# Patient Record
Sex: FEMALE | Race: WHITE | NOT HISPANIC OR LATINO | Employment: UNEMPLOYED | URBAN - NONMETROPOLITAN AREA
[De-identification: names, ages, dates, MRNs, and addresses within clinical notes are randomized per-mention and may not be internally consistent; named-entity substitution may affect disease eponyms.]

---

## 2019-09-20 ENCOUNTER — HOSPITAL ENCOUNTER (OUTPATIENT)
Dept: BONE DENSITY | Facility: HOSPITAL | Age: 74
Discharge: HOME | End: 2019-09-20

## 2019-09-20 ENCOUNTER — HOSPITAL ENCOUNTER (OUTPATIENT)
Dept: MAMMOGRAPHY | Facility: HOSPITAL | Age: 74
Discharge: HOME | End: 2019-09-20
Admitting: NURSE PRACTITIONER

## 2019-09-20 DIAGNOSIS — Z12.39 SCREENING BREAST EXAMINATION: ICD-10-CM

## 2019-09-20 DIAGNOSIS — M81.0 OSTEOPOROSIS, UNSPECIFIED OSTEOPOROSIS TYPE, UNSPECIFIED PATHOLOGICAL FRACTURE PRESENCE: ICD-10-CM

## 2019-09-20 PROCEDURE — 77080 DXA BONE DENSITY AXIAL: CPT

## 2019-09-20 PROCEDURE — 77067 SCR MAMMO BI INCL CAD: CPT | Performed by: RADIOLOGY

## 2019-09-20 PROCEDURE — 77067 SCR MAMMO BI INCL CAD: CPT

## 2019-09-20 PROCEDURE — 77080 DXA BONE DENSITY AXIAL: CPT | Performed by: RADIOLOGY

## 2019-09-20 PROCEDURE — 77063 BREAST TOMOSYNTHESIS BI: CPT

## 2019-09-20 PROCEDURE — 77063 BREAST TOMOSYNTHESIS BI: CPT | Performed by: RADIOLOGY

## 2020-10-30 ENCOUNTER — APPOINTMENT (OUTPATIENT)
Dept: BONE DENSITY | Facility: HOSPITAL | Age: 75
End: 2020-10-30

## 2021-10-28 ENCOUNTER — OFFICE VISIT (OUTPATIENT)
Dept: CARDIOLOGY | Facility: CLINIC | Age: 76
End: 2021-10-28

## 2021-10-28 ENCOUNTER — PATIENT ROUNDING (BHMG ONLY) (OUTPATIENT)
Dept: CARDIOLOGY | Facility: CLINIC | Age: 76
End: 2021-10-28

## 2021-10-28 VITALS
SYSTOLIC BLOOD PRESSURE: 139 MMHG | HEIGHT: 67 IN | BODY MASS INDEX: 29.03 KG/M2 | OXYGEN SATURATION: 96 % | HEART RATE: 80 BPM | WEIGHT: 185 LBS | DIASTOLIC BLOOD PRESSURE: 81 MMHG

## 2021-10-28 DIAGNOSIS — I73.9 PAD (PERIPHERAL ARTERY DISEASE) (HCC): Primary | ICD-10-CM

## 2021-10-28 DIAGNOSIS — I10 ESSENTIAL HYPERTENSION: ICD-10-CM

## 2021-10-28 DIAGNOSIS — R60.0 BILATERAL LEG EDEMA: ICD-10-CM

## 2021-10-28 PROCEDURE — 93000 ELECTROCARDIOGRAM COMPLETE: CPT | Performed by: INTERNAL MEDICINE

## 2021-10-28 PROCEDURE — 99203 OFFICE O/P NEW LOW 30 MIN: CPT | Performed by: INTERNAL MEDICINE

## 2021-10-28 RX ORDER — GABAPENTIN 400 MG/1
CAPSULE ORAL
COMMUNITY
Start: 2021-10-27

## 2021-10-28 RX ORDER — CYCLOBENZAPRINE HCL 10 MG
TABLET ORAL
COMMUNITY
Start: 2021-10-27

## 2021-10-28 RX ORDER — LACTULOSE 10 G/15ML
SOLUTION ORAL
COMMUNITY
Start: 2021-09-27

## 2021-10-28 RX ORDER — METOPROLOL SUCCINATE 25 MG/1
TABLET, EXTENDED RELEASE ORAL
COMMUNITY
Start: 2021-10-04

## 2021-10-28 RX ORDER — FLUTICASONE PROPIONATE 50 MCG
SPRAY, SUSPENSION (ML) NASAL
COMMUNITY
Start: 2021-10-04

## 2021-10-28 RX ORDER — PREDNISONE 1 MG/1
TABLET ORAL
COMMUNITY
Start: 2021-10-01

## 2021-10-28 RX ORDER — FAMOTIDINE 40 MG/1
TABLET, FILM COATED ORAL
COMMUNITY
Start: 2021-10-05

## 2021-10-28 RX ORDER — HYDROCODONE BITARTRATE AND ACETAMINOPHEN 10; 325 MG/1; MG/1
TABLET ORAL
COMMUNITY
Start: 2021-09-27

## 2021-10-28 RX ORDER — FOLIC ACID 1 MG/1
TABLET ORAL
COMMUNITY
Start: 2021-09-29

## 2021-10-28 RX ORDER — LISINOPRIL 10 MG/1
TABLET ORAL
COMMUNITY
Start: 2021-10-04

## 2021-10-28 RX ORDER — METHOTREXATE 2.5 MG/1
TABLET ORAL
COMMUNITY
Start: 2021-09-08

## 2021-10-28 RX ORDER — ASPIRIN 81 MG/1
81 TABLET ORAL DAILY
COMMUNITY

## 2021-10-28 RX ORDER — IBANDRONATE SODIUM 150 MG/1
TABLET, FILM COATED ORAL
COMMUNITY
Start: 2021-09-27

## 2021-10-28 NOTE — PROGRESS NOTES
"October 28, 2021    Hello, may I speak with Tiara Stubbs?    My name is Charanjit Jordan.       I am  with [facility] CARDIOLOGY  [address] [facility] [address]  [place] [address] [phone].    Before we get started may I verify your date of birth? 1945    I am calling to officially welcome you to our practice and ask about your recent visit. Is this a good time to talk? yes    Tell me about your visit with us. What things went well? \"I was very satisfied with Dr. Bush. I am tickled to death with my appointment and he made me feel a lot better about the symptoms I was having.\"       We're always looking for ways to make our patients' experiences even better. Do you have recommendations on ways we may improve?  no    Overall were you satisfied with your first visit to our practice? yes       I appreciate you taking the time to speak with me today. Is there anything else I can do for you? no      Thank you, and have a great day.      "

## 2021-11-17 ENCOUNTER — HOSPITAL ENCOUNTER (OUTPATIENT)
Dept: CARDIOLOGY | Facility: HOSPITAL | Age: 76
Discharge: HOME | End: 2021-11-17
Admitting: INTERNAL MEDICINE

## 2021-11-17 DIAGNOSIS — I10 ESSENTIAL HYPERTENSION: ICD-10-CM

## 2021-11-17 DIAGNOSIS — R60.0 BILATERAL LEG EDEMA: ICD-10-CM

## 2021-11-17 DIAGNOSIS — I73.9 PAD (PERIPHERAL ARTERY DISEASE) (HCC): ICD-10-CM

## 2021-11-17 PROCEDURE — 93306 TTE W/DOPPLER COMPLETE: CPT | Performed by: INTERNAL MEDICINE

## 2021-11-17 PROCEDURE — 93306 TTE W/DOPPLER COMPLETE: CPT

## 2021-11-24 LAB
BH CV ECHO MEAS - % IVS THICK: 17.8 %
BH CV ECHO MEAS - % LVPW THICK: 26.2 %
BH CV ECHO MEAS - ACS: 2.1 CM
BH CV ECHO MEAS - AI DEC SLOPE: 299 CM/SEC^2
BH CV ECHO MEAS - AI MAX PG: 88 MMHG
BH CV ECHO MEAS - AI MAX VEL: 469 CM/SEC
BH CV ECHO MEAS - AI P1/2T: 459.4 MSEC
BH CV ECHO MEAS - AO MAX PG: 12.1 MMHG
BH CV ECHO MEAS - AO MEAN PG: 6 MMHG
BH CV ECHO MEAS - AO ROOT AREA (BSA CORRECTED): 1.5
BH CV ECHO MEAS - AO ROOT AREA: 7.1 CM^2
BH CV ECHO MEAS - AO ROOT DIAM: 3 CM
BH CV ECHO MEAS - AO V2 MAX: 174 CM/SEC
BH CV ECHO MEAS - AO V2 MEAN: 117 CM/SEC
BH CV ECHO MEAS - AO V2 VTI: 39.9 CM
BH CV ECHO MEAS - BSA(HAYCOCK): 2 M^2
BH CV ECHO MEAS - BSA: 2 M^2
BH CV ECHO MEAS - BZI_BMI: 29 KILOGRAMS/M^2
BH CV ECHO MEAS - BZI_METRIC_HEIGHT: 170.2 CM
BH CV ECHO MEAS - BZI_METRIC_WEIGHT: 83.9 KG
BH CV ECHO MEAS - EDV(CUBED): 138.6 ML
BH CV ECHO MEAS - EDV(MOD-SP4): 36.4 ML
BH CV ECHO MEAS - EDV(TEICH): 128.1 ML
BH CV ECHO MEAS - EF(CUBED): 70.9 %
BH CV ECHO MEAS - EF(MOD-SP4): 50.5 %
BH CV ECHO MEAS - EF(TEICH): 62.2 %
BH CV ECHO MEAS - ESV(CUBED): 40.4 ML
BH CV ECHO MEAS - ESV(MOD-SP4): 18 ML
BH CV ECHO MEAS - ESV(TEICH): 48.5 ML
BH CV ECHO MEAS - FS: 33.7 %
BH CV ECHO MEAS - IVS/LVPW: 1.2
BH CV ECHO MEAS - IVSD: 1.1 CM
BH CV ECHO MEAS - IVSS: 1.3 CM
BH CV ECHO MEAS - LA DIMENSION: 2.6 CM
BH CV ECHO MEAS - LA/AO: 0.87
BH CV ECHO MEAS - LV DIASTOLIC VOL/BSA (35-75): 18.6 ML/M^2
BH CV ECHO MEAS - LV MASS(C)D: 199 GRAMS
BH CV ECHO MEAS - LV MASS(C)DI: 101.7 GRAMS/M^2
BH CV ECHO MEAS - LV MASS(C)S: 139.6 GRAMS
BH CV ECHO MEAS - LV MASS(C)SI: 71.4 GRAMS/M^2
BH CV ECHO MEAS - LV SYSTOLIC VOL/BSA (12-30): 9.2 ML/M^2
BH CV ECHO MEAS - LVIDD: 5.2 CM
BH CV ECHO MEAS - LVIDS: 3.4 CM
BH CV ECHO MEAS - LVLD AP4: 5.8 CM
BH CV ECHO MEAS - LVLS AP4: 6.1 CM
BH CV ECHO MEAS - LVOT AREA (M): 2.5 CM^2
BH CV ECHO MEAS - LVOT AREA: 2.5 CM^2
BH CV ECHO MEAS - LVOT DIAM: 1.8 CM
BH CV ECHO MEAS - LVPWD: 0.91 CM
BH CV ECHO MEAS - LVPWS: 1.2 CM
BH CV ECHO MEAS - MV A MAX VEL: 113 CM/SEC
BH CV ECHO MEAS - MV E MAX VEL: 74.9 CM/SEC
BH CV ECHO MEAS - MV E/A: 0.66
BH CV ECHO MEAS - PA ACC TIME: 0.06 SEC
BH CV ECHO MEAS - PA PR(ACCEL): 52 MMHG
BH CV ECHO MEAS - RAP SYSTOLE: 10 MMHG
BH CV ECHO MEAS - RVSP: 42.6 MMHG
BH CV ECHO MEAS - SI(AO): 144.2 ML/M^2
BH CV ECHO MEAS - SI(CUBED): 50.2 ML/M^2
BH CV ECHO MEAS - SI(MOD-SP4): 9.4 ML/M^2
BH CV ECHO MEAS - SI(TEICH): 40.7 ML/M^2
BH CV ECHO MEAS - SV(AO): 282 ML
BH CV ECHO MEAS - SV(CUBED): 98.2 ML
BH CV ECHO MEAS - SV(MOD-SP4): 18.4 ML
BH CV ECHO MEAS - SV(TEICH): 79.6 ML
BH CV ECHO MEAS - TR MAX VEL: 285 CM/SEC
LV EF 2D ECHO EST: 65 %
MAXIMAL PREDICTED HEART RATE: 144 BPM
STRESS TARGET HR: 122 BPM

## 2022-06-07 ENCOUNTER — TELEPHONE (OUTPATIENT)
Dept: CARDIOLOGY | Facility: CLINIC | Age: 77
End: 2022-06-07

## 2022-10-07 ENCOUNTER — HOSPITAL ENCOUNTER (OUTPATIENT)
Dept: BONE DENSITY | Facility: HOSPITAL | Age: 77
Discharge: HOME | End: 2022-10-07
Admitting: PHYSICAL MEDICINE & REHABILITATION

## 2022-10-07 DIAGNOSIS — M54.00 PANNICULITIS AFFECTING BACK: ICD-10-CM

## 2022-10-07 PROCEDURE — 77080 DXA BONE DENSITY AXIAL: CPT

## 2022-10-07 PROCEDURE — 77080 DXA BONE DENSITY AXIAL: CPT | Performed by: RADIOLOGY

## 2024-05-23 ENCOUNTER — OFFICE VISIT (OUTPATIENT)
Dept: PULMONOLOGY | Facility: CLINIC | Age: 79
End: 2024-05-23
Payer: MEDICARE

## 2024-05-23 VITALS
BODY MASS INDEX: 30.73 KG/M2 | DIASTOLIC BLOOD PRESSURE: 82 MMHG | HEIGHT: 67 IN | WEIGHT: 195.8 LBS | SYSTOLIC BLOOD PRESSURE: 148 MMHG | HEART RATE: 94 BPM | TEMPERATURE: 96.8 F | OXYGEN SATURATION: 98 %

## 2024-05-23 DIAGNOSIS — R05.3 CHRONIC COUGH: Primary | ICD-10-CM

## 2024-05-23 DIAGNOSIS — Z23 IMMUNIZATION DUE: ICD-10-CM

## 2024-05-23 DIAGNOSIS — R06.02 SOB (SHORTNESS OF BREATH): ICD-10-CM

## 2024-05-23 DIAGNOSIS — E66.3 OVERWEIGHT (BMI 25.0-29.9): ICD-10-CM

## 2024-05-23 RX ORDER — AZELASTINE 1 MG/ML
1 SPRAY, METERED NASAL 2 TIMES DAILY
COMMUNITY
Start: 2024-04-16

## 2024-05-23 RX ORDER — OLMESARTAN MEDOXOMIL 40 MG/1
TABLET ORAL
COMMUNITY
Start: 2024-04-30

## 2024-05-23 RX ORDER — BENZONATATE 100 MG/1
100 CAPSULE ORAL
COMMUNITY
Start: 2024-05-06

## 2024-05-23 RX ORDER — VALACYCLOVIR HYDROCHLORIDE 500 MG/1
500 TABLET, FILM COATED ORAL
COMMUNITY
Start: 2024-01-18

## 2024-05-23 RX ORDER — ROSUVASTATIN CALCIUM 20 MG/1
20 TABLET, COATED ORAL
COMMUNITY
Start: 2024-03-22

## 2024-05-23 RX ORDER — ALBUTEROL SULFATE 1.25 MG/3ML
1.25 SOLUTION RESPIRATORY (INHALATION)
COMMUNITY
Start: 2024-04-30

## 2024-05-23 NOTE — PROGRESS NOTES
History of Present Illness   Were you born premature?  no    Any Childhood infections? yes      Breathing problems when you were a child? no    Any childhood allergies?    no             At what age did you begin smoking? NA    Smoking marijuana? no    Any IV drugs? no    How many packs per day?  0    Lung Function Test? yes  Chest X-Ray? yes    CT Chest? yes Allergy Test? no    Family hx of Lung disease or Lung Cancer?no    If FHx is posivitive for lung cancer, what is the relationship of the family member?  NA    Any hospitalization in the last year? yes    How far can you walk without getting short of breath? 1/4 miles    Any coughing? resolved     Any wheezing? no    Acid Reflux? yes    Do you snore? no    Daytime Fatigue? yes    Any pets? no   Any pet allergies? 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 no    What inhalers are you currently using?    Veronique Stubbs presents for the following PERSISTENT COUGH    Above mentioned questionnaire was reviewed and discussed with the patient in detail.    Review of Systems  Positive for cough  Otherwise negative  Active Problems:  Problem List Items Addressed This Visit          Endocrine and Metabolic    Overweight (BMI 25.0-29.9)       Pulmonary and Pneumonias    Chronic cough - Primary    SOB (shortness of breath)    Relevant Orders    Complete PFT - Pre & Post Bronchodilator       Other    Immunization due    Relevant Orders    Pneumococcal Polysaccharide Vaccine 23-Valent (PPSV23) Greater Than or Equal To 1yo Subcutaneous / IM (Completed)       Past Medical History:  Past Medical History:   Diagnosis Date    Acid reflux     Fibromyalgia     High cholesterol     Hx: recurrent pneumonia     Hypertension     Lupus     Osteoarthritis     Rheumatoid arthritis        Family History:  Family History   Problem Relation Age of Onset    Heart disease Mother     Liver cancer Mother     Heart disease Father     Breast  cancer Neg Hx        Social History:  Social History     Tobacco Use    Smoking status: Never     Passive exposure: Never    Smokeless tobacco: Never   Substance Use Topics    Alcohol use: Never       Current Medications:  Current Outpatient Medications   Medication Sig Dispense Refill    albuterol (ACCUNEB) 1.25 MG/3ML nebulizer solution 3 mL.      aspirin 81 MG EC tablet Take 1 tablet by mouth Daily.      azelastine (ASTELIN) 0.1 % nasal spray 1 spray 2 (Two) Times a Day.      benzonatate (TESSALON) 100 MG capsule 1 capsule.      Budeson-Glycopyrrol-Formoterol (BREZTRI) 160-9-4.8 MCG/ACT aerosol inhaler 2 (Two) Times a Day.      Cholecalciferol 50 MCG (2000 UT) tablet Daily.      cyclobenzaprine (FLEXERIL) 10 MG tablet       Diclofenac Sodium (VOLTAREN) 1 % gel gel APPLY 4 GRAMS TOPICALLY FOUR TIMES DAILY AS NEEDED FOR PAIN. APPLY TO A SINGLE KNEE ANKLE FOOT (FOR FOOT INCLUDES SOLE TOES TOP OF FOOT      famotidine (PEPCID) 40 MG tablet       fluticasone (FLONASE) 50 MCG/ACT nasal spray       folic acid (FOLVITE) 1 MG tablet Take one tablet every day except mondays      gabapentin (NEURONTIN) 400 MG capsule       HYDROcodone-acetaminophen (NORCO)  MG per tablet TAKE 1 TABLET BY MOUTH FOUR TIMES DAILY AS NEEDED FOR PAIN (DO NOT FILL UNTIL ON OR AFTER 09/26/21)      ibandronate (BONIVA) 150 MG tablet TAKE 1 TABLET BY MOUTH ONCE every MONTH      lactulose (CHRONULAC) 10 GM/15ML solution       metoprolol succinate XL (TOPROL-XL) 25 MG 24 hr tablet       olmesartan (BENICAR) 40 MG tablet TAKE 1 TABLET BY MOUTH ONCE DAILY AS DIRECTED Dose Increase      predniSONE (DELTASONE) 1 MG tablet 4 tablets.      rosuvastatin (CRESTOR) 20 MG tablet 1 tablet.      valACYclovir (VALTREX) 500 MG tablet 1 tablet.       No current facility-administered medications for this visit.       Allergies:  Allergies   Allergen Reactions    Codeine     Stadol [Butorphanol]     Vistaril [Hydroxyzine Hcl]        Vitals:  /82   Pulse 94  "  Temp 96.8 °F (36 °C)   Ht 170.2 cm (67\")   Wt 88.8 kg (195 lb 12.8 oz)   SpO2 98%   BMI 30.67 kg/m²     Imaging:    Imaging Results (Most Recent)       None            Pulmonary Functions Testing Results:    No results found for: \"FEV1\", \"FVC\", \"PZV6XCC\", \"TLC\", \"DLCO\"    Results for orders placed or performed during the hospital encounter of 11/17/21   Adult Transthoracic Echo Complete W/ Cont if Necessary Per Protocol   Result Value Ref Range    BSA 2.0 m^2    IVSd 1.1 cm    IVSs 1.3 cm    LVIDd 5.2 cm    LVIDs 3.4 cm    LVPWd 0.91 cm    BH CV ECHO KEELEY - LVPWS 1.2 cm    IVS/LVPW 1.2     FS 33.7 %    EDV(Teich) 128.1 ml    ESV(Teich) 48.5 ml    EF(Teich) 62.2 %    EDV(cubed) 138.6 ml    ESV(cubed) 40.4 ml    EF(cubed) 70.9 %    % IVS thick 17.8 %    % LVPW thick 26.2 %    LV mass(C)d 199.0 grams    LV mass(C)dI 101.7 grams/m^2    LV mass(C)s 139.6 grams    LV mass(C)sI 71.4 grams/m^2    SV(Teich) 79.6 ml    SI(Teich) 40.7 ml/m^2    SV(cubed) 98.2 ml    SI(cubed) 50.2 ml/m^2    Ao root diam 3.0 cm    Ao root area 7.1 cm^2    ACS 2.1 cm    LA dimension (2D)  2.6 cm    LA/Ao 0.87     LVOT diam 1.8 cm    LVOT area 2.5 cm^2    LVOT area(traced) 2.5 cm^2    LVLd ap4 5.8 cm    EDV(MOD-sp4) 36.4 ml    LVLs ap4 6.1 cm    ESV(MOD-sp4) 18.0 ml    EF(MOD-sp4) 50.5 %    SV(MOD-sp4) 18.4 ml    SVi(MOD-SP4) 9.4 ml/m^2    Ao root area (BSA corrected) 1.5     LV Hopkins Vol (BSA corrected) 18.6 ml/m^2    LV Sys Vol (BSA corrected) 9.2 ml/m^2    MV E max alexx 74.9 cm/sec    MV A max alexx 113.0 cm/sec    MV E/A 0.66     Ao pk alexx 174.0 cm/sec    Ao max PG 12.1 mmHg    Ao V2 mean 117.0 cm/sec    Ao mean PG 6.0 mmHg    Ao V2 VTI 39.9 cm    AI max alexx 469.0 cm/sec    AI max PG 88.0 mmHg    AI dec slope 299.0 cm/sec^2    AI P1/2t 459.4 msec    SV(Ao) 282.0 ml    SI(Ao) 144.2 ml/m^2    PA acc time 0.06 sec    TR max alexx 285.0 cm/sec    RVSP(TR) 42.6 mmHg    RAP systole 10.0 mmHg    PA pr(Accel) 52.0 mmHg    BH CV ECHO KEELEY - BZI_BMI " 29.0 kilograms/m^2     CV ECHO KEELEY - BSA([facility]) 2.0 m^2     CV ECHO KEELEY - BZI_METRIC_WEIGHT 83.9 kg     CV ECHO KEELEY - BZI_METRIC_HEIGHT 170.2 cm    Target HR (85%) 122 bpm    Max. Pred. HR (100%) 144 bpm    Echo EF Estimated 65 %       Objective   Physical Exam  General- normal in appearance, not in any acute distress    HEENT- pupils equally reactive to light, normal in size, no scleral icterus    Neck-supple    Respiratory-respirations normal-on auscultation no wheezing no crackles,     Cardiovascular-  Normal S1 and S2. No S3, S4 or murmurs. No JVD, no carotid bruit and no edema, pulses normal bilaterally     GI-nontender nondistended bowel sounds positive    CNS-nonfocal    Musculoskeletal -no edema  Extremities- no obvious deformity noticed     Psychiatric-mood good, good eye contact, alert awake oriented  Skin- no visible rash        Assessment & Plan   Chronic cough- resolved.    Ct chest shows resolution of infiltrates. Findings were shared and d/w patient.  Images were shown.  Will get PFTs and treat accordingly.  Denies any choking or cough with food or water.      Overweight- diet and exercise counseling done.    SOB- likely multifactorial- related to patients body habitus and deconditioning.  Will get Pfts and treat accordingly.  Ct chest reviewed and doesnot show any parenchymal infiltrates.  Results for orders placed during the hospital encounter of 11/17/21    Adult Transthoracic Echo Complete W/ Cont if Necessary Per Protocol    Interpretation Summary  · Estimated left ventricular EF = 65% Left ventricular ejection fraction appears to be 61 - 65%. Left ventricular systolic function is normal.  · Left ventricular diastolic function was normal.  · Estimated right ventricular systolic pressure from tricuspid regurgitation is mildly elevated (35-45 mmHg).  · Moderate pulmonary hypertension is present.  · No pericardial effusion  · No prev echo     Will give pneumococcal vaccine    Body mass  index is 30.67 kg/m².     ICD-10-CM ICD-9-CM   1. Chronic cough  R05.3 786.2   2. SOB (shortness of breath)  R06.02 786.05   3. Immunization due  Z23 V05.9   4. Overweight (BMI 25.0-29.9)  E66.3 278.02       Return in about 6 months (around 11/23/2024).

## 2024-05-24 PROBLEM — R06.02 SOB (SHORTNESS OF BREATH): Status: ACTIVE | Noted: 2024-05-24

## 2024-05-24 PROBLEM — R05.3 CHRONIC COUGH: Status: ACTIVE | Noted: 2024-05-24

## 2024-05-24 PROBLEM — Z23 IMMUNIZATION DUE: Status: ACTIVE | Noted: 2024-05-24

## 2024-05-24 PROBLEM — E66.3 OVERWEIGHT (BMI 25.0-29.9): Status: ACTIVE | Noted: 2024-05-24

## 2024-12-05 ENCOUNTER — OFFICE VISIT (OUTPATIENT)
Dept: PULMONOLOGY | Facility: CLINIC | Age: 79
End: 2024-12-05
Payer: MEDICARE

## 2024-12-05 VITALS
TEMPERATURE: 97.5 F | HEART RATE: 96 BPM | WEIGHT: 196.2 LBS | SYSTOLIC BLOOD PRESSURE: 136 MMHG | HEIGHT: 67 IN | DIASTOLIC BLOOD PRESSURE: 80 MMHG | BODY MASS INDEX: 30.79 KG/M2 | OXYGEN SATURATION: 95 %

## 2024-12-05 DIAGNOSIS — R05.3 CHRONIC COUGH: Primary | ICD-10-CM

## 2024-12-05 RX ORDER — METHOTREXATE 2.5 MG/1
20 TABLET ORAL
COMMUNITY
Start: 2024-06-26

## 2024-12-05 NOTE — PROGRESS NOTES
"Chief Complaint  Cough    Subjective          Tiara YASSINE Stubbs presents to [facility] PULMONARY & CRITICAL CARE MEDICINE for   History of Present Illness    Ms. Stubbs is a 79 year old female with a medical history significant for fibromyalgia, hypertension, lupus, and RA.    She presents today for follow up on Cough.  She reports that her cough has resolved since he was last seen.  She denies any shortness of breath.  She states that she did her PFT at [facility].        Objective   Vital Signs:   /80   Pulse 96   Temp 97.5 °F (36.4 °C)   Ht 170.2 cm (67\")   Wt 89 kg (196 lb 3.2 oz)   SpO2 95%   BMI 30.73 kg/m²         Physical Exam    GENERAL APPEARANCE: Well developed, well nourished, alert and cooperative, and appears to be in no acute distress.    HEAD: normocephalic. Atraumatic.    EYES: PERRL, EOMI. Vision is grossly intact.    THROAT: Oral cavity and pharynx normal. No inflammation, swelling, exudate, or lesions.     NECK: Neck supple.  No thyromegaly.    CARDIAC: Normal S1 and S2. No S3, S4 or murmurs. Rhythm is regular.     RESPIRATORY:Bilateral air entry positive.  No wheezing, crackles or rhonchi noted.    GI: Positive bowel sounds. Soft, nondistended, nontender.     MUSCULOSKELETAL: No significant deformity or joint abnormality. No edema. Peripheral pulses intact. No varicosities.    NEUROLOGICAL: Strength and sensation symmetric and intact throughout.     PSYCHIATRIC: The mental examination revealed the patient was oriented to person, place, and time.       Estimated body mass index is 30.73 kg/m² as calculated from the following:    Height as of this encounter: 170.2 cm (67\").    Weight as of this encounter: 89 kg (196 lb 3.2 oz).        Result Review :   The following data was reviewed by: DIEGO Palacio on 12/05/2024:  Common labs          6/25/2024    12:28   Common Labs   Albumin 3.8       Total Bilirubin 0.3       Alkaline Phosphatase 58     " "  AST (SGOT) 23       ALT (SGPT) 13       WBC 11.03       Hemoglobin 12.9       Hematocrit 41       Platelets 222          Details          This result is from an external source.                  PFT: Will obtain records.    Low dose lung cancer screening:NA    Previous chest imaging:    CT Chest 4/8/24      Alpha-1 antitrypsin screening:NA    STOP-Bang Score:   NA  [place] Sleepiness Scale:   NA      ABG:    pH No results found for: \"PHART\"   pO2 No results found for: \"PO2ART\"   pCO2 No results found for: \"QMW4LGO\"   HCO3 No results found for: \"CRE4MBY\"                      Assessment and Plan    Problem List Items Addressed This Visit          Pulmonary and Pneumonias    Chronic cough - Primary       Tiara Stubbs  reports that she has never smoked. She has never been exposed to tobacco smoke. She has never used smokeless tobacco.     She reports that her cough has resolved.  She denies any shortness of breath.    CT Chest shows resolution of infiltrates.      Will obtain results of PFT.         Follow Up   Return if symptoms worsen or fail to improve.  Patient was given instructions and counseling regarding her condition or for health maintenance advice. Please see specific information pulled into the AVS if appropriate.        "

## 2025-06-07 ENCOUNTER — APPOINTMENT (OUTPATIENT)
Dept: GENERAL RADIOLOGY | Facility: HOSPITAL | Age: 80
End: 2025-06-07
Payer: MEDICARE

## 2025-06-07 ENCOUNTER — HOSPITAL ENCOUNTER (INPATIENT)
Facility: HOSPITAL | Age: 80
LOS: 10 days | Discharge: HOME HEALTH | End: 2025-06-17
Attending: STUDENT IN AN ORGANIZED HEALTH CARE EDUCATION/TRAINING PROGRAM | Admitting: INTERNAL MEDICINE
Payer: MEDICARE

## 2025-06-07 DIAGNOSIS — S92.901A CLOSED FRACTURE OF RIGHT FOOT, INITIAL ENCOUNTER: ICD-10-CM

## 2025-06-07 DIAGNOSIS — M25.571 ACUTE RIGHT ANKLE PAIN: ICD-10-CM

## 2025-06-07 DIAGNOSIS — S42.91XA SHOULDER FRACTURE, RIGHT, CLOSED, INITIAL ENCOUNTER: Primary | ICD-10-CM

## 2025-06-07 DIAGNOSIS — Z98.890 POST-OPERATIVE STATE: ICD-10-CM

## 2025-06-07 LAB
ALBUMIN SERPL-MCNC: 3.8 G/DL (ref 3.5–5.2)
ALBUMIN/GLOB SERPL: 1.5 G/DL
ALP SERPL-CCNC: 60 U/L (ref 39–117)
ALT SERPL W P-5'-P-CCNC: 49 U/L (ref 1–33)
ANION GAP SERPL CALCULATED.3IONS-SCNC: 12.1 MMOL/L (ref 5–15)
AST SERPL-CCNC: 87 U/L (ref 1–32)
B PARAPERT DNA SPEC QL NAA+PROBE: NOT DETECTED
B PERT DNA SPEC QL NAA+PROBE: NOT DETECTED
BASOPHILS # BLD AUTO: 0.04 10*3/MM3 (ref 0–0.2)
BASOPHILS NFR BLD AUTO: 0.4 % (ref 0–1.5)
BILIRUB SERPL-MCNC: 0.6 MG/DL (ref 0–1.2)
BUN SERPL-MCNC: 19.5 MG/DL (ref 8–23)
BUN/CREAT SERPL: 23.5 (ref 7–25)
C PNEUM DNA NPH QL NAA+NON-PROBE: NOT DETECTED
CALCIUM SPEC-SCNC: 8.7 MG/DL (ref 8.6–10.5)
CHLORIDE SERPL-SCNC: 108 MMOL/L (ref 98–107)
CO2 SERPL-SCNC: 22.9 MMOL/L (ref 22–29)
CREAT SERPL-MCNC: 0.83 MG/DL (ref 0.57–1)
DEPRECATED RDW RBC AUTO: 52.9 FL (ref 37–54)
EGFRCR SERPLBLD CKD-EPI 2021: 71.4 ML/MIN/1.73
EOSINOPHIL # BLD AUTO: 0.15 10*3/MM3 (ref 0–0.4)
EOSINOPHIL NFR BLD AUTO: 1.3 % (ref 0.3–6.2)
ERYTHROCYTE [DISTWIDTH] IN BLOOD BY AUTOMATED COUNT: 14.3 % (ref 12.3–15.4)
FLUAV SUBTYP SPEC NAA+PROBE: NOT DETECTED
FLUBV RNA ISLT QL NAA+PROBE: NOT DETECTED
GLOBULIN UR ELPH-MCNC: 2.6 GM/DL
GLUCOSE SERPL-MCNC: 103 MG/DL (ref 65–99)
HADV DNA SPEC NAA+PROBE: NOT DETECTED
HCOV 229E RNA SPEC QL NAA+PROBE: NOT DETECTED
HCOV HKU1 RNA SPEC QL NAA+PROBE: NOT DETECTED
HCOV NL63 RNA SPEC QL NAA+PROBE: NOT DETECTED
HCOV OC43 RNA SPEC QL NAA+PROBE: NOT DETECTED
HCT VFR BLD AUTO: 37.8 % (ref 34–46.6)
HGB BLD-MCNC: 12.3 G/DL (ref 12–15.9)
HMPV RNA NPH QL NAA+NON-PROBE: NOT DETECTED
HPIV1 RNA ISLT QL NAA+PROBE: NOT DETECTED
HPIV2 RNA SPEC QL NAA+PROBE: NOT DETECTED
HPIV3 RNA NPH QL NAA+PROBE: NOT DETECTED
HPIV4 P GENE NPH QL NAA+PROBE: NOT DETECTED
IMM GRANULOCYTES # BLD AUTO: 0.08 10*3/MM3 (ref 0–0.05)
IMM GRANULOCYTES NFR BLD AUTO: 0.7 % (ref 0–0.5)
LYMPHOCYTES # BLD AUTO: 1.78 10*3/MM3 (ref 0.7–3.1)
LYMPHOCYTES NFR BLD AUTO: 15.9 % (ref 19.6–45.3)
M PNEUMO IGG SER IA-ACNC: NOT DETECTED
MCH RBC QN AUTO: 33 PG (ref 26.6–33)
MCHC RBC AUTO-ENTMCNC: 32.5 G/DL (ref 31.5–35.7)
MCV RBC AUTO: 101.3 FL (ref 79–97)
MONOCYTES # BLD AUTO: 1.23 10*3/MM3 (ref 0.1–0.9)
MONOCYTES NFR BLD AUTO: 11 % (ref 5–12)
NEUTROPHILS NFR BLD AUTO: 7.89 10*3/MM3 (ref 1.7–7)
NEUTROPHILS NFR BLD AUTO: 70.7 % (ref 42.7–76)
NRBC BLD AUTO-RTO: 0 /100 WBC (ref 0–0.2)
PLATELET # BLD AUTO: 158 10*3/MM3 (ref 140–450)
PMV BLD AUTO: 11.2 FL (ref 6–12)
POTASSIUM SERPL-SCNC: 3.9 MMOL/L (ref 3.5–5.2)
PROT SERPL-MCNC: 6.4 G/DL (ref 6–8.5)
RBC # BLD AUTO: 3.73 10*6/MM3 (ref 3.77–5.28)
RHINOVIRUS RNA SPEC NAA+PROBE: DETECTED
RSV RNA NPH QL NAA+NON-PROBE: NOT DETECTED
SARS-COV-2 RNA RESP QL NAA+PROBE: NOT DETECTED
SODIUM SERPL-SCNC: 143 MMOL/L (ref 136–145)
WBC NRBC COR # BLD AUTO: 11.17 10*3/MM3 (ref 3.4–10.8)

## 2025-06-07 PROCEDURE — 80053 COMPREHEN METABOLIC PANEL: CPT

## 2025-06-07 PROCEDURE — 93010 ELECTROCARDIOGRAM REPORT: CPT | Performed by: INTERNAL MEDICINE

## 2025-06-07 PROCEDURE — 25010000002 MORPHINE PER 10 MG: Performed by: STUDENT IN AN ORGANIZED HEALTH CARE EDUCATION/TRAINING PROGRAM

## 2025-06-07 PROCEDURE — 85025 COMPLETE CBC W/AUTO DIFF WBC: CPT

## 2025-06-07 PROCEDURE — 71045 X-RAY EXAM CHEST 1 VIEW: CPT

## 2025-06-07 PROCEDURE — 0202U NFCT DS 22 TRGT SARS-COV-2: CPT

## 2025-06-07 PROCEDURE — 99223 1ST HOSP IP/OBS HIGH 75: CPT

## 2025-06-07 PROCEDURE — 71045 X-RAY EXAM CHEST 1 VIEW: CPT | Performed by: RADIOLOGY

## 2025-06-07 PROCEDURE — 93005 ELECTROCARDIOGRAM TRACING: CPT | Performed by: STUDENT IN AN ORGANIZED HEALTH CARE EDUCATION/TRAINING PROGRAM

## 2025-06-07 RX ORDER — VALACYCLOVIR HYDROCHLORIDE 500 MG/1
500 TABLET, FILM COATED ORAL DAILY
Status: DISCONTINUED | OUTPATIENT
Start: 2025-06-08 | End: 2025-06-17 | Stop reason: HOSPADM

## 2025-06-07 RX ORDER — BISACODYL 5 MG/1
5 TABLET, DELAYED RELEASE ORAL DAILY PRN
Status: DISCONTINUED | OUTPATIENT
Start: 2025-06-07 | End: 2025-06-17 | Stop reason: HOSPADM

## 2025-06-07 RX ORDER — BISACODYL 10 MG
10 SUPPOSITORY, RECTAL RECTAL DAILY PRN
Status: DISCONTINUED | OUTPATIENT
Start: 2025-06-07 | End: 2025-06-17 | Stop reason: HOSPADM

## 2025-06-07 RX ORDER — SODIUM CHLORIDE 0.9 % (FLUSH) 0.9 %
10 SYRINGE (ML) INJECTION EVERY 12 HOURS SCHEDULED
Status: DISCONTINUED | OUTPATIENT
Start: 2025-06-07 | End: 2025-06-17 | Stop reason: HOSPADM

## 2025-06-07 RX ORDER — FAMOTIDINE 20 MG/1
40 TABLET, FILM COATED ORAL DAILY
Status: DISCONTINUED | OUTPATIENT
Start: 2025-06-08 | End: 2025-06-17 | Stop reason: HOSPADM

## 2025-06-07 RX ORDER — CHOLECALCIFEROL (VITAMIN D3) 25 MCG
2000 TABLET ORAL DAILY
Status: DISCONTINUED | OUTPATIENT
Start: 2025-06-08 | End: 2025-06-17 | Stop reason: HOSPADM

## 2025-06-07 RX ORDER — ALBUTEROL SULFATE 1.25 MG/3ML
1.25 SOLUTION RESPIRATORY (INHALATION) 3 TIMES DAILY PRN
Status: DISCONTINUED | OUTPATIENT
Start: 2025-06-07 | End: 2025-06-10

## 2025-06-07 RX ORDER — ONDANSETRON 4 MG/1
4 TABLET, ORALLY DISINTEGRATING ORAL EVERY 6 HOURS PRN
Status: DISCONTINUED | OUTPATIENT
Start: 2025-06-07 | End: 2025-06-17 | Stop reason: HOSPADM

## 2025-06-07 RX ORDER — ASPIRIN 81 MG/1
81 TABLET ORAL DAILY
COMMUNITY

## 2025-06-07 RX ORDER — HYDROCODONE BITARTRATE AND ACETAMINOPHEN 5; 325 MG/1; MG/1
1 TABLET ORAL EVERY 6 HOURS PRN
Refills: 0 | Status: DISCONTINUED | OUTPATIENT
Start: 2025-06-07 | End: 2025-06-07

## 2025-06-07 RX ORDER — POLYETHYLENE GLYCOL 3350 17 G/17G
17 POWDER, FOR SOLUTION ORAL DAILY PRN
Status: DISCONTINUED | OUTPATIENT
Start: 2025-06-07 | End: 2025-06-17 | Stop reason: HOSPADM

## 2025-06-07 RX ORDER — SODIUM CHLORIDE 0.9 % (FLUSH) 0.9 %
10 SYRINGE (ML) INJECTION AS NEEDED
Status: DISCONTINUED | OUTPATIENT
Start: 2025-06-07 | End: 2025-06-17 | Stop reason: HOSPADM

## 2025-06-07 RX ORDER — FOLIC ACID 1 MG/1
1000 TABLET ORAL DAILY
Status: DISCONTINUED | OUTPATIENT
Start: 2025-06-08 | End: 2025-06-17 | Stop reason: HOSPADM

## 2025-06-07 RX ORDER — METOPROLOL SUCCINATE 25 MG/1
25 TABLET, EXTENDED RELEASE ORAL DAILY
Status: DISCONTINUED | OUTPATIENT
Start: 2025-06-08 | End: 2025-06-17 | Stop reason: HOSPADM

## 2025-06-07 RX ORDER — SODIUM CHLORIDE 9 MG/ML
40 INJECTION, SOLUTION INTRAVENOUS AS NEEDED
Status: DISCONTINUED | OUTPATIENT
Start: 2025-06-07 | End: 2025-06-17 | Stop reason: HOSPADM

## 2025-06-07 RX ORDER — HYDROCODONE BITARTRATE AND ACETAMINOPHEN 10; 325 MG/1; MG/1
1 TABLET ORAL 4 TIMES DAILY
Status: DISCONTINUED | OUTPATIENT
Start: 2025-06-08 | End: 2025-06-07

## 2025-06-07 RX ORDER — AMOXICILLIN 250 MG
2 CAPSULE ORAL 2 TIMES DAILY
Status: DISCONTINUED | OUTPATIENT
Start: 2025-06-07 | End: 2025-06-17 | Stop reason: HOSPADM

## 2025-06-07 RX ORDER — MORPHINE SULFATE 2 MG/ML
2 INJECTION, SOLUTION INTRAMUSCULAR; INTRAVENOUS ONCE
Status: COMPLETED | OUTPATIENT
Start: 2025-06-07 | End: 2025-06-07

## 2025-06-07 RX ORDER — MORPHINE SULFATE 2 MG/ML
2 INJECTION, SOLUTION INTRAMUSCULAR; INTRAVENOUS EVERY 4 HOURS PRN
Status: DISCONTINUED | OUTPATIENT
Start: 2025-06-07 | End: 2025-06-07

## 2025-06-07 RX ORDER — CYCLOBENZAPRINE HCL 10 MG
10 TABLET ORAL NIGHTLY
Status: DISCONTINUED | OUTPATIENT
Start: 2025-06-07 | End: 2025-06-09

## 2025-06-07 RX ORDER — ONDANSETRON 2 MG/ML
4 INJECTION INTRAMUSCULAR; INTRAVENOUS EVERY 6 HOURS PRN
Status: DISCONTINUED | OUTPATIENT
Start: 2025-06-07 | End: 2025-06-17 | Stop reason: HOSPADM

## 2025-06-07 RX ORDER — ROSUVASTATIN CALCIUM 20 MG/1
20 TABLET, COATED ORAL DAILY
Status: DISCONTINUED | OUTPATIENT
Start: 2025-06-08 | End: 2025-06-17 | Stop reason: HOSPADM

## 2025-06-07 RX ORDER — HYDROCODONE BITARTRATE AND ACETAMINOPHEN 10; 325 MG/1; MG/1
1 TABLET ORAL 4 TIMES DAILY
Refills: 0 | Status: DISCONTINUED | OUTPATIENT
Start: 2025-06-07 | End: 2025-06-09

## 2025-06-07 RX ORDER — LOSARTAN POTASSIUM 50 MG/1
100 TABLET ORAL
Status: DISCONTINUED | OUTPATIENT
Start: 2025-06-08 | End: 2025-06-17 | Stop reason: HOSPADM

## 2025-06-07 RX ORDER — HYDROCODONE BITARTRATE AND ACETAMINOPHEN 10; 325 MG/1; MG/1
1 TABLET ORAL EVERY 6 HOURS PRN
Refills: 0 | Status: DISCONTINUED | OUTPATIENT
Start: 2025-06-07 | End: 2025-06-07

## 2025-06-07 RX ORDER — PREDNISONE 1 MG/1
4 TABLET ORAL DAILY
Status: DISCONTINUED | OUTPATIENT
Start: 2025-06-08 | End: 2025-06-17 | Stop reason: HOSPADM

## 2025-06-07 RX ORDER — GABAPENTIN 400 MG/1
400 CAPSULE ORAL 3 TIMES DAILY
Status: DISCONTINUED | OUTPATIENT
Start: 2025-06-07 | End: 2025-06-17 | Stop reason: HOSPADM

## 2025-06-07 RX ORDER — NITROGLYCERIN 0.4 MG/1
0.4 TABLET SUBLINGUAL
Status: DISCONTINUED | OUTPATIENT
Start: 2025-06-07 | End: 2025-06-17 | Stop reason: HOSPADM

## 2025-06-07 RX ADMIN — MORPHINE SULFATE 4 MG: 4 INJECTION, SOLUTION INTRAMUSCULAR; INTRAVENOUS at 22:51

## 2025-06-07 RX ADMIN — GABAPENTIN 400 MG: 400 CAPSULE ORAL at 21:40

## 2025-06-07 RX ADMIN — HYDROCODONE BITARTRATE AND ACETAMINOPHEN 1 TABLET: 5; 325 TABLET ORAL at 12:28

## 2025-06-07 RX ADMIN — CYCLOBENZAPRINE 10 MG: 10 TABLET, FILM COATED ORAL at 21:41

## 2025-06-07 RX ADMIN — SENNOSIDES, DOCUSATE SODIUM 2 TABLET: 50; 8.6 TABLET, FILM COATED ORAL at 21:40

## 2025-06-07 RX ADMIN — HYDROCODONE BITARTRATE AND ACETAMINOPHEN 1 TABLET: 10; 325 TABLET ORAL at 18:06

## 2025-06-07 RX ADMIN — Medication 10 ML: at 10:24

## 2025-06-07 RX ADMIN — MORPHINE SULFATE 2 MG: 2 INJECTION, SOLUTION INTRAMUSCULAR; INTRAVENOUS at 15:08

## 2025-06-07 RX ADMIN — Medication 10 ML: at 21:41

## 2025-06-07 RX ADMIN — MORPHINE SULFATE 2 MG: 2 INJECTION, SOLUTION INTRAMUSCULAR; INTRAVENOUS at 16:28

## 2025-06-07 RX ADMIN — HYDROCODONE BITARTRATE AND ACETAMINOPHEN 1 TABLET: 10; 325 TABLET ORAL at 21:40

## 2025-06-07 RX ADMIN — MORPHINE SULFATE 2 MG: 2 INJECTION, SOLUTION INTRAMUSCULAR; INTRAVENOUS at 10:22

## 2025-06-07 NOTE — H&P
[facility] Medicine Services  History & Physical    Patient Identification:  Name:  Tiara Stubbs  Age:  80 y.o.  Sex:  female  :  1945  MRN:  [id number]   Visit Number:  [id number]  Admit Date: 2025   Primary Care Physician:  Paula Downey APRN    Subjective     Chief complaint: Transfer from OSH due to fall, fractures    History of presenting illness:      Tiara Stubbs is a 80 y.o. female who presented for further evaluation and management of multiple fractures s/p fall.  Per report from transferring facility patient fell from standing height and on OSH ED workup was found to have a right humeral fracture as well as right shoulder dislocation and an ankle fracture.  She was seen and examined on 3S with family at the bedside. She is generally comfortable appearing but does have intermittent pain with noted grimacing. She states had a mechanical fall when standing from the toilet though did not have any preceding dizziness. She landed on her right side. She did hit her head, just lateral to the right eye and has bruising. She and family state CT head was completed at OSH and negative. She states normally doesn't have any trouble ambulating at home, doesn't require walker or cane. She does live with her daughter and EMILY.   She reports she has otherwise been feeling okay prior to the fall. Has had recent issues with pneumonia/bronchitis but reported has completed treatment with no SOA or cough reported today. She denies any chest pain, no history of heart disease. She does take daily ASA at the direction of her rheumatologist. She denies any abdominal pain, nausea, vomiting, diarrhea. She is not having difficulty urinating.   She has history of RA- historically on methotrexate but only taking prednisone currently.     Past medical history is significant for RA, osteoarthritis, hypertension, lupus, hyperlipidemia, GERD      Known Emergency Department medications  received prior to my evaluation included morphine.   Room location at the time of my evaluation was 304b.     ---------------------------------------------------------------------------------------------------------------------   Review of Systems   Constitutional:  Negative for chills and fever.   HENT:  Negative for congestion and rhinorrhea.    Respiratory:  Negative for cough and shortness of breath.    Cardiovascular:  Negative for chest pain and leg swelling.   Gastrointestinal:  Negative for abdominal pain, diarrhea, nausea and vomiting.   Genitourinary:  Negative for difficulty urinating and dysuria.   Musculoskeletal:  Positive for arthralgias and myalgias.   Skin:  Negative for rash and wound.   Neurological:  Negative for dizziness and light-headedness.        ---------------------------------------------------------------------------------------------------------------------   Past Medical History:   Diagnosis Date    Acid reflux     Fibromyalgia     High cholesterol     Hx: recurrent pneumonia     Hypertension     Lupus     Osteoarthritis     Rheumatoid arthritis      Past Surgical History:   Procedure Laterality Date    CARPAL TUNNEL RELEASE Bilateral     KNEE SURGERY Right     TOTAL WRIST ARTHROPLASTY Right      Family History   Problem Relation Age of Onset    Heart disease Mother     Liver cancer Mother     Heart disease Father     Breast cancer Neg Hx      Social History     Socioeconomic History    Marital status:    Tobacco Use    Smoking status: Never     Passive exposure: Never    Smokeless tobacco: Never   Vaping Use    Vaping status: Never Used   Substance and Sexual Activity    Alcohol use: Never    Drug use: Never    Sexual activity: Defer     ---------------------------------------------------------------------------------------------------------------------   Allergies:  Codeine, Stadol [butorphanol], and Vistaril [hydroxyzine  hcl]  ---------------------------------------------------------------------------------------------------------------------   Home medications:    Medications below are reported home medications pulling from within the system; at this time, these medications have not been reconciled unless otherwise specified and are in the verification process for further verifcation as current home medications.  Medications Prior to Admission   Medication Sig Dispense Refill Last Dose/Taking    albuterol (ACCUNEB) 1.25 MG/3ML nebulizer solution 3 mL.       aspirin 81 MG EC tablet Take 1 tablet by mouth Daily.       azelastine (ASTELIN) 0.1 % nasal spray 1 spray 2 (Two) Times a Day.       benzonatate (TESSALON) 100 MG capsule 1 capsule.       Budeson-Glycopyrrol-Formoterol (BREZTRI) 160-9-4.8 MCG/ACT aerosol inhaler 2 (Two) Times a Day.       Cholecalciferol 50 MCG (2000 UT) tablet Daily.       cyclobenzaprine (FLEXERIL) 10 MG tablet        Diclofenac Sodium (VOLTAREN) 1 % gel gel APPLY 4 GRAMS TOPICALLY FOUR TIMES DAILY AS NEEDED FOR PAIN. APPLY TO A SINGLE KNEE ANKLE FOOT (FOR FOOT INCLUDES SOLE TOES TOP OF FOOT       famotidine (PEPCID) 40 MG tablet        fluticasone (FLONASE) 50 MCG/ACT nasal spray        folic acid (FOLVITE) 1 MG tablet Take one tablet every day except mondays       gabapentin (NEURONTIN) 400 MG capsule        HYDROcodone-acetaminophen (NORCO)  MG per tablet TAKE 1 TABLET BY MOUTH FOUR TIMES DAILY AS NEEDED FOR PAIN (DO NOT FILL UNTIL ON OR AFTER 09/26/21)       ibandronate (BONIVA) 150 MG tablet TAKE 1 TABLET BY MOUTH ONCE every MONTH       lactulose (CHRONULAC) 10 GM/15ML solution        methotrexate 2.5 MG tablet Take 8 tablets by mouth.       metoprolol succinate XL (TOPROL-XL) 25 MG 24 hr tablet        olmesartan (BENICAR) 40 MG tablet TAKE 1 TABLET BY MOUTH ONCE DAILY AS DIRECTED Dose Increase       predniSONE (DELTASONE) 1 MG tablet 4 tablets.       rosuvastatin (CRESTOR) 20 MG tablet 1 tablet.    "    valACYclovir (VALTREX) 500 MG tablet 1 tablet.          Hospital Scheduled Meds:  senna-docusate sodium, 2 tablet, Oral, BID  sodium chloride, 10 mL, Intravenous, Q12H           Current listed hospital scheduled medications may not yet reflect those currently placed in orders that are signed and held awaiting patient's arrival to floor.   ---------------------------------------------------------------------------------------------------------------------     Objective     Vital Signs:     There were no vitals filed for this visit.  There is no height or weight on file to calculate BMI.  ---------------------------------------------------------------------------------------------------------------------       Physical Exam  Vitals and nursing note reviewed.   Constitutional:       General: She is not in acute distress.  HENT:      Head: Normocephalic.   Eyes:      Extraocular Movements: Extraocular movements intact.      Comments: There is bruising just lateral of her right eye   Cardiovascular:      Rate and Rhythm: Normal rate and regular rhythm.   Pulmonary:      Effort: Pulmonary effort is normal. No respiratory distress.   Abdominal:      Palpations: Abdomen is soft.   Musculoskeletal:      Left lower leg: No edema.      Comments: Splint is in place to the right lower extremity  Sling in place to right upper extremity   Skin:     General: Skin is warm and dry.   Neurological:      Mental Status: She is alert. Mental status is at baseline.   Psychiatric:         Mood and Affect: Mood normal.             ---------------------------------------------------------------------------------------------------------------------  EKG:      ---------------------------------------------------------------------------------------------------------------------                 Invalid input(s): \"PROT\"CrCl cannot be calculated (No successful lab value found.).  No results found for: \"AMMONIA\"          No results found for: " "\"HGBA1C\"  No results found for: \"TSH\", \"FREET4\"  No results found for: \"PREGTESTUR\", \"PREGSERUM\", \"HCG\", \"HCGQUANT\"  Pain Management Panel           No data to display              No results found for: \"BLOODCX\"  No results found for: \"URINECX\"  No results found for: \"WOUNDCX\"  No results found for: \"STOOLCX\"      ---------------------------------------------------------------------------------------------------------------------  Imaging Results (Last 7 Days)       ** No results found for the last 168 hours. **            Cultures:  No results found for: \"BLOODCX\", \"URINECX\", \"WOUNDCX\", \"MRSACX\", \"RESPCX\", \"STOOLCX\"    Last echocardiogram:  Results for orders placed during the hospital encounter of 11/17/21    Adult Transthoracic Echo Complete W/ Cont if Necessary Per Protocol    Interpretation Summary  · Estimated left ventricular EF = 65% Left ventricular ejection fraction appears to be 61 - 65%. Left ventricular systolic function is normal.  · Left ventricular diastolic function was normal.  · Estimated right ventricular systolic pressure from tricuspid regurgitation is mildly elevated (35-45 mmHg).  · Moderate pulmonary hypertension is present.  · No pericardial effusion  · No prev echo          I have personally reviewed the above radiology images and read the final radiology report on 06/07/25  ---------------------------------------------------------------------------------------------------------------------  Assessment / Plan     Active Hospital Problems    Diagnosis  POA    **Shoulder fracture, right, closed, initial encounter [S42.91XA]  Yes       ASSESSMENT/PLAN:    Mechanical fall, PTA  Right humerus fracture  Right shoulder dislocation  Right fifth metatarsal fracture  Patient presented from OSH for further management of multiple fractures s/p mechanical fall.  Per report patient has fractured right humerus with dislocated right shoulder and fractured her right fifth metatarsal.  Orthopedic surgery " consulted for further assistance and recommendations, appreciated.  Per report plan will be for right shoulder replacement-will be delayed until Monday or Tuesday when surgical equipment can be relocated to this hospital as patient has refused transfer to [facility]  Admit to the telemetry unit  Continue pain regimen and adjust as needed  Will obtain preprocedure EKG  Basic laboratory workup is pending  She will benefit from PT OT postoperatively  May also require CM/SW assistance with discharge planning.    Chronic:  Hyperlipidemia  RA/osteoarthritis  Lupus  Hypertension  GERD  Continue home medication regimen as indicated once med rec is complete  Will continue to monitor vital signs closely    ----------  -DVT prophylaxis: SCDs  -Activity: Bedrest  -Expected length of stay: INPATIENT status due to the need for care which can only be reasonably provided in an hospital setting such as aggressive/expedited ancillary services and/or consultation services, the necessity for IV medications, close physician monitoring and/or the possible need for procedures.  In such, I feel patient’s risk for adverse outcomes and need for care warrant INPATIENT evaluation and predict the patient’s care encounter to likely last beyond 2 midnights.   -Disposition Pending further clinical course and progress with PT OT post op    High risk secondary to Fall with multiple fractures    There are no questions and answers to display.       Tico Mckoy PA-C   06/07/25  10:22 EDT

## 2025-06-08 ENCOUNTER — APPOINTMENT (OUTPATIENT)
Dept: GENERAL RADIOLOGY | Facility: HOSPITAL | Age: 80
End: 2025-06-08
Payer: MEDICARE

## 2025-06-08 PROBLEM — S92.901A CLOSED FRACTURE OF BONE OF RIGHT FOOT: Status: ACTIVE | Noted: 2025-06-07

## 2025-06-08 PROBLEM — M25.571 ACUTE RIGHT ANKLE PAIN: Status: ACTIVE | Noted: 2025-06-07

## 2025-06-08 LAB
ABO GROUP BLD: NORMAL
ABO GROUP BLD: NORMAL
BLD GP AB SCN SERPL QL: NEGATIVE
RH BLD: POSITIVE
RH BLD: POSITIVE
T&S EXPIRATION DATE: NORMAL

## 2025-06-08 PROCEDURE — 73030 X-RAY EXAM OF SHOULDER: CPT

## 2025-06-08 PROCEDURE — 86900 BLOOD TYPING SEROLOGIC ABO: CPT

## 2025-06-08 PROCEDURE — 99232 SBSQ HOSP IP/OBS MODERATE 35: CPT | Performed by: STUDENT IN AN ORGANIZED HEALTH CARE EDUCATION/TRAINING PROGRAM

## 2025-06-08 PROCEDURE — 86900 BLOOD TYPING SEROLOGIC ABO: CPT | Performed by: NURSE PRACTITIONER

## 2025-06-08 PROCEDURE — 73030 X-RAY EXAM OF SHOULDER: CPT | Performed by: RADIOLOGY

## 2025-06-08 PROCEDURE — 94799 UNLISTED PULMONARY SVC/PX: CPT

## 2025-06-08 PROCEDURE — 86901 BLOOD TYPING SEROLOGIC RH(D): CPT | Performed by: NURSE PRACTITIONER

## 2025-06-08 PROCEDURE — 25010000002 MORPHINE PER 10 MG: Performed by: STUDENT IN AN ORGANIZED HEALTH CARE EDUCATION/TRAINING PROGRAM

## 2025-06-08 PROCEDURE — 25010000002 ONDANSETRON PER 1 MG: Performed by: STUDENT IN AN ORGANIZED HEALTH CARE EDUCATION/TRAINING PROGRAM

## 2025-06-08 PROCEDURE — 73610 X-RAY EXAM OF ANKLE: CPT | Performed by: RADIOLOGY

## 2025-06-08 PROCEDURE — 86850 RBC ANTIBODY SCREEN: CPT | Performed by: NURSE PRACTITIONER

## 2025-06-08 PROCEDURE — 73630 X-RAY EXAM OF FOOT: CPT

## 2025-06-08 PROCEDURE — 73610 X-RAY EXAM OF ANKLE: CPT

## 2025-06-08 PROCEDURE — 73630 X-RAY EXAM OF FOOT: CPT | Performed by: RADIOLOGY

## 2025-06-08 PROCEDURE — 25010000002 HYDROMORPHONE PER 4 MG: Performed by: STUDENT IN AN ORGANIZED HEALTH CARE EDUCATION/TRAINING PROGRAM

## 2025-06-08 PROCEDURE — 86901 BLOOD TYPING SEROLOGIC RH(D): CPT

## 2025-06-08 PROCEDURE — 63710000001 PREDNISONE PER 5 MG: Performed by: STUDENT IN AN ORGANIZED HEALTH CARE EDUCATION/TRAINING PROGRAM

## 2025-06-08 RX ORDER — HYDROMORPHONE HYDROCHLORIDE 1 MG/ML
0.5 INJECTION, SOLUTION INTRAMUSCULAR; INTRAVENOUS; SUBCUTANEOUS
Refills: 0 | Status: DISCONTINUED | OUTPATIENT
Start: 2025-06-08 | End: 2025-06-09

## 2025-06-08 RX ADMIN — VALACYCLOVIR HYDROCHLORIDE 500 MG: 500 TABLET, FILM COATED ORAL at 08:25

## 2025-06-08 RX ADMIN — LOSARTAN POTASSIUM 100 MG: 50 TABLET, FILM COATED ORAL at 08:26

## 2025-06-08 RX ADMIN — Medication 10 ML: at 20:44

## 2025-06-08 RX ADMIN — HYDROCODONE BITARTRATE AND ACETAMINOPHEN 1 TABLET: 10; 325 TABLET ORAL at 08:25

## 2025-06-08 RX ADMIN — HYDROMORPHONE HYDROCHLORIDE 0.5 MG: 1 INJECTION, SOLUTION INTRAMUSCULAR; INTRAVENOUS; SUBCUTANEOUS at 18:47

## 2025-06-08 RX ADMIN — HYDROMORPHONE HYDROCHLORIDE 0.5 MG: 1 INJECTION, SOLUTION INTRAMUSCULAR; INTRAVENOUS; SUBCUTANEOUS at 14:42

## 2025-06-08 RX ADMIN — HYDROCODONE BITARTRATE AND ACETAMINOPHEN 1 TABLET: 10; 325 TABLET ORAL at 12:21

## 2025-06-08 RX ADMIN — SENNOSIDES, DOCUSATE SODIUM 2 TABLET: 50; 8.6 TABLET, FILM COATED ORAL at 20:44

## 2025-06-08 RX ADMIN — SENNOSIDES, DOCUSATE SODIUM 2 TABLET: 50; 8.6 TABLET, FILM COATED ORAL at 08:25

## 2025-06-08 RX ADMIN — GABAPENTIN 400 MG: 400 CAPSULE ORAL at 08:25

## 2025-06-08 RX ADMIN — Medication 2000 UNITS: at 08:25

## 2025-06-08 RX ADMIN — ONDANSETRON 4 MG: 2 INJECTION INTRAMUSCULAR; INTRAVENOUS at 09:53

## 2025-06-08 RX ADMIN — Medication 10 ML: at 08:26

## 2025-06-08 RX ADMIN — MORPHINE SULFATE 4 MG: 4 INJECTION, SOLUTION INTRAMUSCULAR; INTRAVENOUS at 06:19

## 2025-06-08 RX ADMIN — HYDROMORPHONE HYDROCHLORIDE 0.5 MG: 1 INJECTION, SOLUTION INTRAMUSCULAR; INTRAVENOUS; SUBCUTANEOUS at 09:53

## 2025-06-08 RX ADMIN — ROSUVASTATIN 20 MG: 20 TABLET, FILM COATED ORAL at 08:25

## 2025-06-08 RX ADMIN — GABAPENTIN 400 MG: 400 CAPSULE ORAL at 17:22

## 2025-06-08 RX ADMIN — METOPROLOL SUCCINATE 25 MG: 25 TABLET, EXTENDED RELEASE ORAL at 08:25

## 2025-06-08 RX ADMIN — FAMOTIDINE 40 MG: 20 TABLET, FILM COATED ORAL at 08:25

## 2025-06-08 RX ADMIN — FOLIC ACID 1000 MCG: 1 TABLET ORAL at 08:26

## 2025-06-08 RX ADMIN — CYCLOBENZAPRINE 10 MG: 10 TABLET, FILM COATED ORAL at 20:44

## 2025-06-08 RX ADMIN — PREDNISONE 4 MG: 1 TABLET ORAL at 08:26

## 2025-06-08 NOTE — PLAN OF CARE
Problem: Adult Inpatient Plan of Care  Goal: Plan of Care Review  Outcome: Progressing  Flowsheets (Taken 6/8/2025 0252)  Progress: improving  Outcome Evaluation: Pt alert and oriented x4. Complaints of pain in R shoulder and R ankle. PRN ordered medication given. VSS. Will continue with plan of care.  Plan of Care Reviewed With: patient   Goal Outcome Evaluation:  Plan of Care Reviewed With: patient        Progress: improving  Outcome Evaluation: Pt alert and oriented x4. Complaints of pain in R shoulder and R ankle. PRN ordered medication given. VSS. Will continue with plan of care.

## 2025-06-08 NOTE — CONSULTS
Inpatient Orthopedic Surgery Consult  Consult performed by: Henrry Vincent APRN  Consult ordered by: Amanda Pineda DO          Patient Identification:  Name:  Tiara Stubbs  Age:  80 y.o.  Sex:  female  :  1945  MRN:  [id number]  Visit Number:  [id number]  Primary care provider:  Paula Downey APRN    History of presenting illness: This is an 80-year-old female who was transferred to this facility from an outside hospital with a right proximal humerus fracture with shoulder dislocation and right foot fracture, DOI: 2025.  The patient notes that she fell when getting up from the toilet, injuring the shoulder and foot.  Imaging at the outside facility showed a proximal humerus fracture with shoulder dislocation and a right foot fracture.  She was placed in a short leg splint to the right foot and a sling to the right upper extremity.  She was transferred to this facility for management of the fractures.  Today she notes her pain is fairly controlled with her current pain medication.  No paresthesias.  She lives at home with her daughter and son-in-law.  She is ambulatory without the use of an assist device for mobility.  She is not employed.  She denies prior injury or surgery to the right shoulder.  She takes aspirin 81 mg daily.  She is right-hand dominant.    ---------------------------------------------------------------------------------------------------------------------    Review of Systems   Constitutional:  Positive for activity change.   HENT: Negative.     Respiratory: Negative.     Cardiovascular: Negative.    Gastrointestinal: Negative.    Endocrine: Negative.    Genitourinary: Negative.    Musculoskeletal:  Positive for arthralgias and joint swelling.        Right shoulder and right foot pain.   Skin: Negative.    Neurological: Negative.       ---------------------------------------------------------------------------------------------------------------------   Past  History:  Family History   Problem Relation Age of Onset    Heart disease Mother     Liver cancer Mother     Heart disease Father     Breast cancer Neg Hx      Past Medical History:   Diagnosis Date    Acid reflux     Fibromyalgia     High cholesterol     Hx: recurrent pneumonia     Hypertension     Lupus     Osteoarthritis     Rheumatoid arthritis      Past Surgical History:   Procedure Laterality Date    CARPAL TUNNEL RELEASE Bilateral     KNEE SURGERY Right     TOTAL WRIST ARTHROPLASTY Right      Social History     Socioeconomic History    Marital status:    Tobacco Use    Smoking status: Never     Passive exposure: Never    Smokeless tobacco: Never   Vaping Use    Vaping status: Never Used   Substance and Sexual Activity    Alcohol use: Never    Drug use: Never    Sexual activity: Defer     ---------------------------------------------------------------------------------------------------------------------   Allergies:  Codeine, Stadol [butorphanol], and Vistaril [hydroxyzine hcl]  ---------------------------------------------------------------------------------------------------------------------   Prior to Admission Medications       Prescriptions Last Dose Informant Patient Reported? Taking?    albuterol (ACCUNEB) 1.25 MG/3ML nebulizer solution Past Week Child, Other Yes Yes    Take 3 mL by nebulization 3 (Three) Times a Day As Needed for Wheezing or Shortness of Air.    aspirin 81 MG EC tablet 6/6/2025  Yes Yes    Take 1 tablet by mouth Daily.    Cholecalciferol 50 MCG (2000 UT) tablet 6/6/2025 Child, Other Yes Yes    Take 1 tablet by mouth Daily.    cyclobenzaprine (FLEXERIL) 10 MG tablet 6/6/2025 Child, Other Yes Yes    Take 1 tablet by mouth Every Night.    famotidine (PEPCID) 40 MG tablet 6/6/2025 Child, Other Yes Yes    Take 1 tablet by mouth Daily.    folic acid (FOLVITE) 1 MG tablet 6/6/2025 Other, Child Yes Yes    Take 1 tablet by mouth Daily.    gabapentin (NEURONTIN) 400 MG capsule 6/6/2025  Child, Other Yes Yes    Take 1 capsule by mouth 3 (Three) Times a Day.    HYDROcodone-acetaminophen (NORCO)  MG per tablet 6/6/2025 Child, Other Yes Yes    Take 1 tablet by mouth 4 (Four) Times a Day.    metoprolol succinate XL (TOPROL-XL) 25 MG 24 hr tablet 6/6/2025 Child, Other Yes Yes    Take 1 tablet by mouth Daily.    olmesartan (BENICAR) 40 MG tablet 6/6/2025 Child, Other Yes Yes    Take 1 tablet by mouth Daily.    predniSONE (DELTASONE) 1 MG tablet 6/6/2025 Child, Other Yes Yes    Take 4 tablets by mouth Daily.    rosuvastatin (CRESTOR) 20 MG tablet 6/6/2025 Other, Child Yes Yes    Take 1 tablet by mouth Daily.    valACYclovir (VALTREX) 500 MG tablet 6/6/2025 Child, Other Yes Yes    Take 1 tablet by mouth Daily.          Hospital Meds:  cholecalciferol, 2,000 Units, Oral, Daily  cyclobenzaprine, 10 mg, Oral, Nightly  famotidine, 40 mg, Oral, Daily  folic acid, 1,000 mcg, Oral, Daily  gabapentin, 400 mg, Oral, TID  HYDROcodone-acetaminophen, 1 tablet, Oral, 4x Daily  losartan, 100 mg, Oral, Q24H  metoprolol succinate XL, 25 mg, Oral, Daily  predniSONE, 4 mg, Oral, Daily  rosuvastatin, 20 mg, Oral, Daily  senna-docusate sodium, 2 tablet, Oral, BID  sodium chloride, 10 mL, Intravenous, Q12H  valACYclovir, 500 mg, Oral, Daily         ---------------------------------------------------------------------------------------------------------------------   Vital Signs:  Temp:  [97.8 °F (36.6 °C)-99.1 °F (37.3 °C)] 97.8 °F (36.6 °C)  Heart Rate:  [] 106  Resp:  [16-20] 20  BP: (144-158)/(65-77) 152/75      06/07/25  1005 06/08/25  0500   Weight: 90.7 kg (199 lb 15.3 oz) 88.1 kg (194 lb 3.6 oz)     Body mass index is 30.42 kg/m².  ---------------------------------------------------------------------------------------------------------------------     Physical exam:  Constitutional:  Well-developed and well-nourished.  No acute distress.      HENT:  Head: Normocephalic and atraumatic.  Mouth:  Moist mucous  membranes.    Eyes:  Conjunctivae are normal.  Pupils are equal, round, and reactive to light.  No scleral icterus.  Neck:  Neck supple.  Trachea midline.  Cardiovascular:  Normal rate and regular rhythm.  Pulmonary/Chest:  No respiratory distress and good air movement.  Abdominal:  Soft.  No distension and no tenderness.   Musculoskeletal: Upon examination of the right shoulder, there is a fullness to the anterior shoulder, minimal ecchymosis, no erythema, no open wounds, generalized tenderness.  She is able to flex extend the elbow.  She is unable to flex or extend the wrist due to a prior fusion.  She is able to flex and extend the digits.  Capillary refill is brisk and light touch sensation is intact distally.  Radial pulse present.  Upon examination of the right foot, there is a short leg splint in place.  Splint is well-fitted.  No edema, erythema, ecchymosis, or edema to the surrounding tissues.  Tissues are soft.  She is able to flex and extend the digits.  Capillary refill is brisk and light touch sensation is intact distally.  Neurological:  Alert and oriented to person, place, and time.     Skin:  Skin is warm and dry.  No rash noted.  No ecchymosis or abrasion.   Psychiatric:  Normal mood and affect.  Behavior is normal.  Judgment and thought content normal.   Peripheral vascular:  No pitting edema and strong pulses on all 4 extremities.      ---------------------------------------------------------------------------------------------------------------------   .  ---------------------------------------------------------------------------------------------------------------------   Results from last 7 days   Lab Units 06/07/25  1120   WBC 10*3/mm3 11.17*   HEMOGLOBIN g/dL 12.3   HEMATOCRIT % 37.8   MCV fL 101.3*   MCHC g/dL 32.5   PLATELETS 10*3/mm3 158         Results from last 7 days   Lab Units 06/07/25  1120   SODIUM mmol/L 143   POTASSIUM mmol/L 3.9   CHLORIDE mmol/L 108*   CO2 mmol/L 22.9   BUN  "mg/dL 19.5   CREATININE mg/dL 0.83   CALCIUM mg/dL 8.7   GLUCOSE mg/dL 103*   ALBUMIN g/dL 3.8   BILIRUBIN mg/dL 0.6   ALK PHOS U/L 60   AST (SGOT) U/L 87*   ALT (SGPT) U/L 49*   Estimated Creatinine Clearance: 61.6 mL/min (by C-G formula based on SCr of 0.83 mg/dL).  No results found for: \"AMMONIA\"          No results found for: \"HGBA1C\"  No results found for: \"TSH\", \"FREET4\"  No results found for: \"PREGTESTUR\", \"PREGSERUM\", \"HCG\", \"HCGQUANT\"  Pain Management Panel           No data to display              No results found for: \"BLOODCX\"  No results found for: \"URINECX\"  No results found for: \"WOUNDCX\"  No results found for: \"STOOLCX\"      ---------------------------------------------------------------------------------------------------------------------   Imaging Results (Last 7 Days)       Procedure Component Value Units Date/Time    XR Chest 1 View - In process [[id number]] Resulted: 06/07/25 1158     Updated: 06/07/25 1213    This result has not been signed. Information might be incomplete.            ----------------------------------------------------------------------------------------------------------------------      Assessment:     Right proximal humerus fracture with shoulder dislocation, DOI: 6/6/2025.    Right foot fracture, DOI: 6/6/2025.      Plan:     This patient was discussed with Dr. Myles, the on-call surgeon.  He has reviewed her imaging from the outside facility and recommends a right reverse total shoulder arthroplasty to be done tomorrow.  The nature of this procedure, as well as, risks, benefits, alternatives and complications to the above operation were discussed with the patient. Risks include, but are not limited to, anesthesia complications, death, injury to nerves and vessels, infection, blood clots, continued pain and repeat or revision surgery. Following the discussion, the patient verbalized understanding of the risks and benefits to the above procedure and elected to proceed " with surgery.  Surgical consent was obtained.  It was explained to the patient that the surgery will be done tomorrow as we have to arrange for equipment to be brought from [facility].    N.P.O. after midnight.    Case request placed and  made aware of need for equipment from [facility].    Nonweightbearing to the right upper and right lower extremities.    Maintain a sling to the right upper extremity.    Maintain the short leg splint to the right lower extremity.    We will obtain x-rays of the right shoulder and right foot and ankle, no imaging available in the system here.    Pain control.    Orthopedic surgery following.    Thank you for the consult.    DIEGO Moralez  06/08/25  10:24 EDT

## 2025-06-08 NOTE — PLAN OF CARE
Goal Outcome Evaluation:    Pt is resting in bed with no s/s of distress noted. VSS. IV access maintained. NPO at midnight for procedure in AM. Will continue with plan of care.

## 2025-06-08 NOTE — PROGRESS NOTES
[facility] HOSPITALIST PROGRESS NOTE     Patient Identification:  Name:  Tiara Stubbs  Age:  80 y.o.  Sex:  female  :  1945  MRN:  [id number]  Visit Number:  [id number]  ROOM: [address] [place] [address]     Primary Care Provider:  Paula Downey APRN    Length of stay in inpatient status:  1    Subjective     History of Presenting Illness:    Patient had reported to nursing staff this morning that she was still having severe pain despite doses of hydrocodone-acetaminophen and morphine, so I changed morphine to prn dilaudid. At time of my exam patient reported that her pain was much improved and was mild as long as she remained still. She denied shortness of breath or other complaint.     Objective     Current Hospital Meds:cholecalciferol, 2,000 Units, Oral, Daily  cyclobenzaprine, 10 mg, Oral, Nightly  famotidine, 40 mg, Oral, Daily  folic acid, 1,000 mcg, Oral, Daily  gabapentin, 400 mg, Oral, TID  HYDROcodone-acetaminophen, 1 tablet, Oral, 4x Daily  losartan, 100 mg, Oral, Q24H  metoprolol succinate XL, 25 mg, Oral, Daily  predniSONE, 4 mg, Oral, Daily  rosuvastatin, 20 mg, Oral, Daily  senna-docusate sodium, 2 tablet, Oral, BID  sodium chloride, 10 mL, Intravenous, Q12H  valACYclovir, 500 mg, Oral, Daily         Current Antimicrobial Therapy:  Anti-Infectives (From admission, onward)      Ordered     Dose/Rate Route Frequency Start Stop    25  valACYclovir (VALTREX) tablet 500 mg        Ordering Provider: Amanda Pineda DO    500 mg Oral Daily 25 0900 26 [id number]          Current Diuretic Therapy:  Diuretics (From admission, onward)      None          ----------------------------------------------------------------------------------------------------------------------  Vital Signs:  Temp:  [97.4 °F (36.3 °C)-99.1 °F (37.3 °C)] 98.7 °F (37.1 °C)  Heart Rate:  [100-106] 100  Resp:  [20] 20  BP: (123-158)/(58-77) 123/58  SpO2:  [91 %-95 %] 94 %  on   ;   Device (Oxygen Therapy):  room air  Body mass index is 30.42 kg/m².    Wt Readings from Last 3 Encounters:   06/08/25 88.1 kg (194 lb 3.6 oz)   12/05/24 89 kg (196 lb 3.2 oz)   05/23/24 88.8 kg (195 lb 12.8 oz)     Intake & Output (last 3 days)         06/05 0701  06/06 0700 06/06 0701  06/07 0700 06/07 0701 06/08 0700 06/08 0701  06/09 0700    P.O.    480    Total Intake(mL/kg)    480 (5.4)    Urine (mL/kg/hr)   1350 450 (0.4)    Stool   0     Total Output   1350 450    Net   -1350 +30                  Diet: Regular/House; Fluid Consistency: Thin (IDDSI 0)  NPO Diet NPO Type: Strict NPO  ----------------------------------------------------------------------------------------------------------------------  Physical exam:   Constitutional:  Well-developed and well-nourished.  No acute distress.      HENT:  Head:  Normocephalic and atraumatic.   Cardiovascular:  Normal rate, regular rhythm   Pulmonary/Chest:  No respiratory distress, no wheezes, no crackles, no rhonchi  Musculoskeletal: Right arm is in sling and swath. Right foot and lower leg is splinted.  Neurological: Awake, alert, no focal deficit on gross examination. No slurred speech or facial droop.   Skin:  Skin is warm and dry.   Peripheral vascular:  No cyanosis, extremities are warm and well perfused  Psychiatric: Appropriate mood and affect    ----------------------------------------------------------------------------------------------------------------------  Results from last 7 days   Lab Units 06/07/25  1120   WBC 10*3/mm3 11.17*   HEMOGLOBIN g/dL 12.3   HEMATOCRIT % 37.8   MCV fL 101.3*   MCHC g/dL 32.5   PLATELETS 10*3/mm3 158         Results from last 7 days   Lab Units 06/07/25  1120   SODIUM mmol/L 143   POTASSIUM mmol/L 3.9   CHLORIDE mmol/L 108*   CO2 mmol/L 22.9   BUN mg/dL 19.5   CREATININE mg/dL 0.83   CALCIUM mg/dL 8.7   GLUCOSE mg/dL 103*   ALBUMIN g/dL 3.8   BILIRUBIN mg/dL 0.6   ALK PHOS U/L 60   AST (SGOT) U/L 87*   ALT (SGPT) U/L 49*   Estimated Creatinine  Clearance: 61.6 mL/min (by C-G formula based on SCr of 0.83 mg/dL).    Pain Management Panel           No data to display              Brief Urine Lab Results       None          I have personally looked at the labs and they are summarized above.  ----------------------------------------------------------------------------------------------------------------------  Detailed radiology reports for the last 24 hours:  Imaging Results (Last 24 Hours)       Procedure Component Value Units Date/Time    XR Shoulder 2+ View Right [[id number]] Collected: 06/08/25 1531     Updated: 06/08/25 1534    Narrative:      INDICATION: Pain and injury.     TECHNIQUE: 3 views of the right shoulder.     COMPARISON: No relevant priors.       Impression:      FINDINGS/IMPRESSION: Comminuted, displaced humeral head/neck fracture.  Anterior/inferior dislocation of the right shoulder. Soft tissue  swelling.     This report was finalized on 6/8/2025 3:32 PM by Alex Pallas, DO.       XR Foot 3+ View Right [[id number]] Collected: 06/08/25 1530     Updated: 06/08/25 1533    Narrative:      INDICATION: Pain and injury.     TECHNIQUE: 3 views of the right foot. 3 views of the right ankle     COMPARISON: No relevant priors.     FINDINGS:   Evaluation of the right ankle and the right foot.     Mildly displaced fracture of the proximal aspect of the fifth  metatarsal. Mildly displaced distal fibular fracture. Remaining osseous  structures intact. No dislocation. No lytic or blastic bony lesions  seen. Mild diffuse joint space loss.     Soft tissue swelling.       Impression:      Mildly displaced fracture of the proximal aspect of the fifth  metatarsal. Mildly displaced distal fibular fracture.     This report was finalized on 6/8/2025 3:31 PM by Alex Pallas, DO.       XR Ankle 3+ View Right [[id number]] Collected: 06/08/25 1530     Updated: 06/08/25 1533    Narrative:      INDICATION: Pain and injury.     TECHNIQUE: 3 views of the right foot. 3 views of  the right ankle     COMPARISON: No relevant priors.     FINDINGS:   Evaluation of the right ankle and the right foot.     Mildly displaced fracture of the proximal aspect of the fifth  metatarsal. Mildly displaced distal fibular fracture. Remaining osseous  structures intact. No dislocation. No lytic or blastic bony lesions  seen. Mild diffuse joint space loss.     Soft tissue swelling.       Impression:      Mildly displaced fracture of the proximal aspect of the fifth  metatarsal. Mildly displaced distal fibular fracture.     This report was finalized on 6/8/2025 3:31 PM by Alex Pallas, DO.       XR Chest 1 View [[id number]] Collected: 06/08/25 1334     Updated: 06/08/25 1340    Narrative:      PROCEDURE: Chest x-ray examination performed on June 7, 2025. Single  view.     HISTORY: Cough.     COMPARISON: None.     FINDINGS:     Mild enlarged heart size  Coarsened bronchovascular pattern to the lungs.  Right humeral head and neck fracture with anterior dislocation of the  humeral head.  No free air in the upper abdomen.  Slight levoconvex curve at the mid thoracic spine.       Impression:         Mild enlarged heart size.  Hypoinflated lungs.  Right humeral head and neck fracture with anterior dislocation.  No pleural effusion. No pneumothorax.  Mild levoconvex curve at the mid thoracic spine.     This report was finalized on 6/8/2025 1:38 PM by Bobby Bright MD.             Assessment & Plan      #Right humerus fracture and right shoulder dislocation secondary to mechanical ground level fall  #Right fifth metatarsal fracture  - Patient's pain was not well controlled with scheduled hydrocodone-acetaminophen and prn morphine, so morphine was changed to dilaudid today. Patient reported significant relief of pain after receiving a dose of dilaudid. Continue same.  - Appreciate Orthopedic Surgery assistance. Plan for surgical fixation likely sometime Monday or Tuesday when the necessary surgical equipment can be  brought here from [facility].   - Plan for PT/OT with restrictions per Ortho postoperatively  - SCDs on the left leg are ordered for DVT ppx pending surgical intervention    #Hyperlipidemia - continue rosuvastatin  #RA  #Osteoarthritis  #Lupus  #Hypertension - BP well controlled. Continue home metoprolol and losartan.  #GERD - continue famotidine    Active VTE Prophylaxis  Mechanical:        Start        06/07/25 1003  Maintain Sequential Compression Device  Continuous                            Dispo: pending surgery and then PT/OT evaluation    Amanda Pineda DO  [facility] Hospitalist  06/08/25  18:56 EDT

## 2025-06-09 ENCOUNTER — ANESTHESIA EVENT (OUTPATIENT)
Dept: PERIOP | Facility: HOSPITAL | Age: 80
End: 2025-06-09
Payer: MEDICARE

## 2025-06-09 ENCOUNTER — ANESTHESIA (OUTPATIENT)
Dept: PERIOP | Facility: HOSPITAL | Age: 80
End: 2025-06-09
Payer: MEDICARE

## 2025-06-09 ENCOUNTER — APPOINTMENT (OUTPATIENT)
Dept: GENERAL RADIOLOGY | Facility: HOSPITAL | Age: 80
End: 2025-06-09
Payer: MEDICARE

## 2025-06-09 LAB
ANION GAP SERPL CALCULATED.3IONS-SCNC: 13.1 MMOL/L (ref 5–15)
BASOPHILS # BLD AUTO: 0.06 10*3/MM3 (ref 0–0.2)
BASOPHILS NFR BLD AUTO: 0.3 % (ref 0–1.5)
BUN SERPL-MCNC: 16.7 MG/DL (ref 8–23)
BUN/CREAT SERPL: 21.4 (ref 7–25)
CALCIUM SPEC-SCNC: 8.8 MG/DL (ref 8.6–10.5)
CHLORIDE SERPL-SCNC: 105 MMOL/L (ref 98–107)
CO2 SERPL-SCNC: 18.9 MMOL/L (ref 22–29)
CREAT SERPL-MCNC: 0.78 MG/DL (ref 0.57–1)
DEPRECATED RDW RBC AUTO: 53.8 FL (ref 37–54)
EGFRCR SERPLBLD CKD-EPI 2021: 76.9 ML/MIN/1.73
EOSINOPHIL # BLD AUTO: 0.06 10*3/MM3 (ref 0–0.4)
EOSINOPHIL NFR BLD AUTO: 0.3 % (ref 0.3–6.2)
ERYTHROCYTE [DISTWIDTH] IN BLOOD BY AUTOMATED COUNT: 14.1 % (ref 12.3–15.4)
GLUCOSE SERPL-MCNC: 100 MG/DL (ref 65–99)
HCT VFR BLD AUTO: 39.6 % (ref 34–46.6)
HGB BLD-MCNC: 12.7 G/DL (ref 12–15.9)
IMM GRANULOCYTES # BLD AUTO: 0.13 10*3/MM3 (ref 0–0.05)
IMM GRANULOCYTES NFR BLD AUTO: 0.7 % (ref 0–0.5)
INR PPP: 1.04 (ref 0.9–1.1)
LYMPHOCYTES # BLD AUTO: 2.85 10*3/MM3 (ref 0.7–3.1)
LYMPHOCYTES NFR BLD AUTO: 16 % (ref 19.6–45.3)
MAGNESIUM SERPL-MCNC: 2.4 MG/DL (ref 1.6–2.4)
MCH RBC QN AUTO: 33 PG (ref 26.6–33)
MCHC RBC AUTO-ENTMCNC: 32.1 G/DL (ref 31.5–35.7)
MCV RBC AUTO: 102.9 FL (ref 79–97)
MONOCYTES # BLD AUTO: 2.33 10*3/MM3 (ref 0.1–0.9)
MONOCYTES NFR BLD AUTO: 13.1 % (ref 5–12)
NEUTROPHILS NFR BLD AUTO: 12.37 10*3/MM3 (ref 1.7–7)
NEUTROPHILS NFR BLD AUTO: 69.6 % (ref 42.7–76)
NRBC BLD AUTO-RTO: 0 /100 WBC (ref 0–0.2)
PLATELET # BLD AUTO: 183 10*3/MM3 (ref 140–450)
PMV BLD AUTO: 11.2 FL (ref 6–12)
POTASSIUM SERPL-SCNC: 4.1 MMOL/L (ref 3.5–5.2)
PROTHROMBIN TIME: 14.2 SECONDS (ref 12.5–15.2)
RBC # BLD AUTO: 3.85 10*6/MM3 (ref 3.77–5.28)
SODIUM SERPL-SCNC: 137 MMOL/L (ref 136–145)
WBC NRBC COR # BLD AUTO: 17.8 10*3/MM3 (ref 3.4–10.8)

## 2025-06-09 PROCEDURE — 25010000002 HYDROMORPHONE PER 4 MG: Performed by: STUDENT IN AN ORGANIZED HEALTH CARE EDUCATION/TRAINING PROGRAM

## 2025-06-09 PROCEDURE — 85610 PROTHROMBIN TIME: CPT | Performed by: NURSE PRACTITIONER

## 2025-06-09 PROCEDURE — C1776 JOINT DEVICE (IMPLANTABLE): HCPCS | Performed by: ORTHOPAEDIC SURGERY

## 2025-06-09 PROCEDURE — 99232 SBSQ HOSP IP/OBS MODERATE 35: CPT | Performed by: INTERNAL MEDICINE

## 2025-06-09 PROCEDURE — 73030 X-RAY EXAM OF SHOULDER: CPT | Performed by: RADIOLOGY

## 2025-06-09 PROCEDURE — 85025 COMPLETE CBC W/AUTO DIFF WBC: CPT | Performed by: NURSE PRACTITIONER

## 2025-06-09 PROCEDURE — 25010000002 MORPHINE PER 10 MG: Performed by: ORTHOPAEDIC SURGERY

## 2025-06-09 PROCEDURE — 25010000002 ROPIVACAINE PER 1 MG: Performed by: ANESTHESIOLOGY

## 2025-06-09 PROCEDURE — 94799 UNLISTED PULMONARY SVC/PX: CPT

## 2025-06-09 PROCEDURE — C1713 ANCHOR/SCREW BN/BN,TIS/BN: HCPCS | Performed by: ORTHOPAEDIC SURGERY

## 2025-06-09 PROCEDURE — 0RRJ00Z REPLACEMENT OF RIGHT SHOULDER JOINT WITH REVERSE BALL AND SOCKET SYNTHETIC SUBSTITUTE, OPEN APPROACH: ICD-10-PCS | Performed by: ORTHOPAEDIC SURGERY

## 2025-06-09 PROCEDURE — L3660 SO 8 AB RSTR CAN/WEB PRE OTS: HCPCS | Performed by: ORTHOPAEDIC SURGERY

## 2025-06-09 PROCEDURE — 25010000002 FENTANYL CITRATE (PF) 50 MCG/ML SOLUTION: Performed by: ANESTHESIOLOGY

## 2025-06-09 PROCEDURE — 25010000002 CEFAZOLIN PER 500 MG: Performed by: ORTHOPAEDIC SURGERY

## 2025-06-09 PROCEDURE — 25010000002 LIDOCAINE PF 2% 2 % SOLUTION: Performed by: NURSE ANESTHETIST, CERTIFIED REGISTERED

## 2025-06-09 PROCEDURE — 25810000003 LACTATED RINGERS PER 1000 ML: Performed by: ANESTHESIOLOGY

## 2025-06-09 PROCEDURE — 25010000002 NEOSTIGMINE 10 MG/10ML SOLUTION: Performed by: NURSE ANESTHETIST, CERTIFIED REGISTERED

## 2025-06-09 PROCEDURE — 25010000002 GLYCOPYRROLATE 0.4 MG/2ML SOLUTION: Performed by: NURSE ANESTHETIST, CERTIFIED REGISTERED

## 2025-06-09 PROCEDURE — 25010000002 VANCOMYCIN 1 G RECONSTITUTED SOLUTION 1 EACH VIAL: Performed by: ORTHOPAEDIC SURGERY

## 2025-06-09 PROCEDURE — 83735 ASSAY OF MAGNESIUM: CPT | Performed by: INTERNAL MEDICINE

## 2025-06-09 PROCEDURE — 73030 X-RAY EXAM OF SHOULDER: CPT

## 2025-06-09 PROCEDURE — 25010000002 ONDANSETRON PER 1 MG: Performed by: NURSE ANESTHETIST, CERTIFIED REGISTERED

## 2025-06-09 PROCEDURE — 63710000001 PREDNISONE PER 5 MG: Performed by: STUDENT IN AN ORGANIZED HEALTH CARE EDUCATION/TRAINING PROGRAM

## 2025-06-09 PROCEDURE — 25010000002 PHENYLEPHRINE 10 MG/ML SOLUTION: Performed by: NURSE ANESTHETIST, CERTIFIED REGISTERED

## 2025-06-09 PROCEDURE — 25010000002 HYDROMORPHONE 1 MG/ML SOLUTION: Performed by: STUDENT IN AN ORGANIZED HEALTH CARE EDUCATION/TRAINING PROGRAM

## 2025-06-09 PROCEDURE — 25810000003 LACTATED RINGERS PER 1000 ML: Performed by: NURSE ANESTHETIST, CERTIFIED REGISTERED

## 2025-06-09 PROCEDURE — 80048 BASIC METABOLIC PNL TOTAL CA: CPT | Performed by: NURSE PRACTITIONER

## 2025-06-09 PROCEDURE — 25010000002 ONDANSETRON PER 1 MG: Performed by: STUDENT IN AN ORGANIZED HEALTH CARE EDUCATION/TRAINING PROGRAM

## 2025-06-09 PROCEDURE — 25810000003 SODIUM CHLORIDE 0.9 % SOLUTION: Performed by: ORTHOPAEDIC SURGERY

## 2025-06-09 PROCEDURE — 25010000002 KETOROLAC TROMETHAMINE PER 15 MG: Performed by: ORTHOPAEDIC SURGERY

## 2025-06-09 PROCEDURE — 25010000002 FAMOTIDINE (PF) 20 MG/2ML SOLUTION: Performed by: NURSE ANESTHETIST, CERTIFIED REGISTERED

## 2025-06-09 PROCEDURE — 25010000002 DEXAMETHASONE PER 1 MG: Performed by: ANESTHESIOLOGY

## 2025-06-09 PROCEDURE — 94761 N-INVAS EAR/PLS OXIMETRY MLT: CPT

## 2025-06-09 PROCEDURE — 25010000002 PROPOFOL 10 MG/ML EMULSION: Performed by: NURSE ANESTHETIST, CERTIFIED REGISTERED

## 2025-06-09 RX ORDER — OXYCODONE AND ACETAMINOPHEN 7.5; 325 MG/1; MG/1
1 TABLET ORAL EVERY 8 HOURS PRN
Qty: 20 TABLET | Refills: 0 | Status: SHIPPED | OUTPATIENT
Start: 2025-06-09

## 2025-06-09 RX ORDER — ROPIVACAINE HYDROCHLORIDE 5 MG/ML
INJECTION, SOLUTION EPIDURAL; INFILTRATION; PERINEURAL
Status: COMPLETED | OUTPATIENT
Start: 2025-06-09 | End: 2025-06-09

## 2025-06-09 RX ORDER — GLYCOPYRROLATE 0.2 MG/ML
INJECTION INTRAMUSCULAR; INTRAVENOUS AS NEEDED
Status: DISCONTINUED | OUTPATIENT
Start: 2025-06-09 | End: 2025-06-09 | Stop reason: SURG

## 2025-06-09 RX ORDER — DEXAMETHASONE SODIUM PHOSPHATE 4 MG/ML
INJECTION, SOLUTION INTRA-ARTICULAR; INTRALESIONAL; INTRAMUSCULAR; INTRAVENOUS; SOFT TISSUE
Status: COMPLETED | OUTPATIENT
Start: 2025-06-09 | End: 2025-06-09

## 2025-06-09 RX ORDER — OXYCODONE AND ACETAMINOPHEN 7.5; 325 MG/1; MG/1
2 TABLET ORAL EVERY 4 HOURS PRN
Status: DISPENSED | OUTPATIENT
Start: 2025-06-09 | End: 2025-06-14

## 2025-06-09 RX ORDER — TRANEXAMIC ACID 10 MG/ML
1000 INJECTION, SOLUTION INTRAVENOUS ONCE
Status: DISCONTINUED | OUTPATIENT
Start: 2025-06-09 | End: 2025-06-09 | Stop reason: HOSPADM

## 2025-06-09 RX ORDER — TRANEXAMIC ACID 10 MG/ML
1000 INJECTION, SOLUTION INTRAVENOUS ONCE
Status: COMPLETED | OUTPATIENT
Start: 2025-06-09 | End: 2025-06-09

## 2025-06-09 RX ORDER — NEOSTIGMINE METHYLSULFATE 1 MG/ML
INJECTION INTRAVENOUS AS NEEDED
Status: DISCONTINUED | OUTPATIENT
Start: 2025-06-09 | End: 2025-06-09 | Stop reason: SURG

## 2025-06-09 RX ORDER — IPRATROPIUM BROMIDE AND ALBUTEROL SULFATE 2.5; .5 MG/3ML; MG/3ML
3 SOLUTION RESPIRATORY (INHALATION) ONCE AS NEEDED
Status: DISCONTINUED | OUTPATIENT
Start: 2025-06-09 | End: 2025-06-09 | Stop reason: HOSPADM

## 2025-06-09 RX ORDER — MAGNESIUM HYDROXIDE 1200 MG/15ML
LIQUID ORAL AS NEEDED
Status: DISCONTINUED | OUTPATIENT
Start: 2025-06-09 | End: 2025-06-09 | Stop reason: HOSPADM

## 2025-06-09 RX ORDER — CYCLOBENZAPRINE HCL 10 MG
10 TABLET ORAL NIGHTLY
Status: DISCONTINUED | OUTPATIENT
Start: 2025-06-12 | End: 2025-06-17 | Stop reason: HOSPADM

## 2025-06-09 RX ORDER — ONDANSETRON 4 MG/1
4 TABLET, ORALLY DISINTEGRATING ORAL EVERY 6 HOURS PRN
Status: DISCONTINUED | OUTPATIENT
Start: 2025-06-09 | End: 2025-06-17 | Stop reason: HOSPADM

## 2025-06-09 RX ORDER — FENTANYL CITRATE 50 UG/ML
INJECTION, SOLUTION INTRAMUSCULAR; INTRAVENOUS AS NEEDED
Status: DISCONTINUED | OUTPATIENT
Start: 2025-06-09 | End: 2025-06-09 | Stop reason: SURG

## 2025-06-09 RX ORDER — ONDANSETRON 2 MG/ML
INJECTION INTRAMUSCULAR; INTRAVENOUS AS NEEDED
Status: DISCONTINUED | OUTPATIENT
Start: 2025-06-09 | End: 2025-06-09 | Stop reason: SURG

## 2025-06-09 RX ORDER — CYCLOBENZAPRINE HCL 10 MG
10 TABLET ORAL 3 TIMES DAILY
Status: COMPLETED | OUTPATIENT
Start: 2025-06-09 | End: 2025-06-11

## 2025-06-09 RX ORDER — ONDANSETRON 2 MG/ML
4 INJECTION INTRAMUSCULAR; INTRAVENOUS AS NEEDED
Status: DISCONTINUED | OUTPATIENT
Start: 2025-06-09 | End: 2025-06-09 | Stop reason: HOSPADM

## 2025-06-09 RX ORDER — CYCLOBENZAPRINE HCL 10 MG
10 TABLET ORAL 3 TIMES DAILY
Status: DISCONTINUED | OUTPATIENT
Start: 2025-06-09 | End: 2025-06-09

## 2025-06-09 RX ORDER — SODIUM CHLORIDE, SODIUM LACTATE, POTASSIUM CHLORIDE, CALCIUM CHLORIDE 600; 310; 30; 20 MG/100ML; MG/100ML; MG/100ML; MG/100ML
INJECTION, SOLUTION INTRAVENOUS CONTINUOUS PRN
Status: DISCONTINUED | OUTPATIENT
Start: 2025-06-09 | End: 2025-06-09 | Stop reason: SURG

## 2025-06-09 RX ORDER — SODIUM CHLORIDE 0.9 % (FLUSH) 0.9 %
10 SYRINGE (ML) INJECTION AS NEEDED
Status: DISCONTINUED | OUTPATIENT
Start: 2025-06-09 | End: 2025-06-09 | Stop reason: HOSPADM

## 2025-06-09 RX ORDER — MEPERIDINE HYDROCHLORIDE 25 MG/ML
12.5 INJECTION INTRAMUSCULAR; INTRAVENOUS; SUBCUTANEOUS
Status: DISCONTINUED | OUTPATIENT
Start: 2025-06-09 | End: 2025-06-09 | Stop reason: HOSPADM

## 2025-06-09 RX ORDER — FAMOTIDINE 20 MG/1
20 TABLET, FILM COATED ORAL
Status: DISCONTINUED | OUTPATIENT
Start: 2025-06-09 | End: 2025-06-10 | Stop reason: ALTCHOICE

## 2025-06-09 RX ORDER — SODIUM CHLORIDE 9 MG/ML
125 INJECTION, SOLUTION INTRAVENOUS CONTINUOUS
Status: DISCONTINUED | OUTPATIENT
Start: 2025-06-09 | End: 2025-06-10

## 2025-06-09 RX ORDER — SODIUM CHLORIDE, SODIUM LACTATE, POTASSIUM CHLORIDE, CALCIUM CHLORIDE 600; 310; 30; 20 MG/100ML; MG/100ML; MG/100ML; MG/100ML
100 INJECTION, SOLUTION INTRAVENOUS ONCE AS NEEDED
Status: DISCONTINUED | OUTPATIENT
Start: 2025-06-09 | End: 2025-06-09 | Stop reason: HOSPADM

## 2025-06-09 RX ORDER — ROCURONIUM BROMIDE 10 MG/ML
INJECTION, SOLUTION INTRAVENOUS AS NEEDED
Status: DISCONTINUED | OUTPATIENT
Start: 2025-06-09 | End: 2025-06-09 | Stop reason: SURG

## 2025-06-09 RX ORDER — PHENYLEPHRINE HYDROCHLORIDE 10 MG/ML
INJECTION INTRAVENOUS AS NEEDED
Status: DISCONTINUED | OUTPATIENT
Start: 2025-06-09 | End: 2025-06-09 | Stop reason: SURG

## 2025-06-09 RX ORDER — KETOROLAC TROMETHAMINE 30 MG/ML
15 INJECTION, SOLUTION INTRAMUSCULAR; INTRAVENOUS EVERY 6 HOURS PRN
Status: COMPLETED | OUTPATIENT
Start: 2025-06-09 | End: 2025-06-10

## 2025-06-09 RX ORDER — ONDANSETRON 4 MG/1
4 TABLET, FILM COATED ORAL EVERY 8 HOURS PRN
Qty: 15 TABLET | Refills: 0 | Status: SHIPPED | OUTPATIENT
Start: 2025-06-09

## 2025-06-09 RX ORDER — SODIUM CHLORIDE, SODIUM LACTATE, POTASSIUM CHLORIDE, CALCIUM CHLORIDE 600; 310; 30; 20 MG/100ML; MG/100ML; MG/100ML; MG/100ML
125 INJECTION, SOLUTION INTRAVENOUS ONCE
Status: COMPLETED | OUTPATIENT
Start: 2025-06-09 | End: 2025-06-09

## 2025-06-09 RX ORDER — FENTANYL CITRATE 50 UG/ML
50 INJECTION, SOLUTION INTRAMUSCULAR; INTRAVENOUS
Status: DISCONTINUED | OUTPATIENT
Start: 2025-06-09 | End: 2025-06-09 | Stop reason: HOSPADM

## 2025-06-09 RX ORDER — LIDOCAINE HYDROCHLORIDE 20 MG/ML
INJECTION, SOLUTION EPIDURAL; INFILTRATION; INTRACAUDAL; PERINEURAL AS NEEDED
Status: DISCONTINUED | OUTPATIENT
Start: 2025-06-09 | End: 2025-06-09 | Stop reason: SURG

## 2025-06-09 RX ORDER — FAMOTIDINE 10 MG/ML
INJECTION, SOLUTION INTRAVENOUS AS NEEDED
Status: DISCONTINUED | OUTPATIENT
Start: 2025-06-09 | End: 2025-06-09 | Stop reason: SURG

## 2025-06-09 RX ORDER — SODIUM CHLORIDE 9 MG/ML
40 INJECTION, SOLUTION INTRAVENOUS AS NEEDED
Status: DISCONTINUED | OUTPATIENT
Start: 2025-06-09 | End: 2025-06-09 | Stop reason: HOSPADM

## 2025-06-09 RX ORDER — NALOXONE HCL 0.4 MG/ML
0.4 VIAL (ML) INJECTION
Status: DISCONTINUED | OUTPATIENT
Start: 2025-06-09 | End: 2025-06-17 | Stop reason: HOSPADM

## 2025-06-09 RX ORDER — SODIUM CHLORIDE 0.9 % (FLUSH) 0.9 %
10 SYRINGE (ML) INJECTION EVERY 12 HOURS SCHEDULED
Status: DISCONTINUED | OUTPATIENT
Start: 2025-06-09 | End: 2025-06-09 | Stop reason: HOSPADM

## 2025-06-09 RX ORDER — CYCLOBENZAPRINE HCL 10 MG
10 TABLET ORAL 3 TIMES DAILY PRN
Qty: 20 TABLET | Refills: 0 | Status: SHIPPED | OUTPATIENT
Start: 2025-06-09

## 2025-06-09 RX ORDER — OXYCODONE AND ACETAMINOPHEN 5; 325 MG/1; MG/1
1 TABLET ORAL ONCE AS NEEDED
Status: DISCONTINUED | OUTPATIENT
Start: 2025-06-09 | End: 2025-06-09 | Stop reason: HOSPADM

## 2025-06-09 RX ORDER — MIDAZOLAM HYDROCHLORIDE 1 MG/ML
0.5 INJECTION, SOLUTION INTRAMUSCULAR; INTRAVENOUS
Status: DISCONTINUED | OUTPATIENT
Start: 2025-06-09 | End: 2025-06-09 | Stop reason: HOSPADM

## 2025-06-09 RX ORDER — PROPOFOL 10 MG/ML
VIAL (ML) INTRAVENOUS AS NEEDED
Status: DISCONTINUED | OUTPATIENT
Start: 2025-06-09 | End: 2025-06-09 | Stop reason: SURG

## 2025-06-09 RX ADMIN — HYDROMORPHONE HYDROCHLORIDE 0.5 MG: 1 INJECTION, SOLUTION INTRAMUSCULAR; INTRAVENOUS; SUBCUTANEOUS at 05:39

## 2025-06-09 RX ADMIN — SODIUM CHLORIDE, POTASSIUM CHLORIDE, SODIUM LACTATE AND CALCIUM CHLORIDE: 600; 310; 30; 20 INJECTION, SOLUTION INTRAVENOUS at 13:02

## 2025-06-09 RX ADMIN — SENNOSIDES, DOCUSATE SODIUM 2 TABLET: 50; 8.6 TABLET, FILM COATED ORAL at 08:32

## 2025-06-09 RX ADMIN — PREDNISONE 4 MG: 1 TABLET ORAL at 08:32

## 2025-06-09 RX ADMIN — METOPROLOL SUCCINATE 25 MG: 25 TABLET, EXTENDED RELEASE ORAL at 08:32

## 2025-06-09 RX ADMIN — GLYCOPYRROLATE 0.4 MG: 0.2 INJECTION INTRAMUSCULAR; INTRAVENOUS at 15:52

## 2025-06-09 RX ADMIN — ONDANSETRON 4 MG: 2 INJECTION INTRAMUSCULAR; INTRAVENOUS at 13:27

## 2025-06-09 RX ADMIN — LIDOCAINE HYDROCHLORIDE 50 MG: 20 INJECTION, SOLUTION EPIDURAL; INFILTRATION; INTRACAUDAL; PERINEURAL at 13:06

## 2025-06-09 RX ADMIN — Medication 2000 UNITS: at 08:31

## 2025-06-09 RX ADMIN — CEFAZOLIN 2000 MG: 2 INJECTION, POWDER, FOR SOLUTION INTRAMUSCULAR; INTRAVENOUS at 23:18

## 2025-06-09 RX ADMIN — FENTANYL CITRATE 50 MCG: 50 INJECTION, SOLUTION INTRAMUSCULAR; INTRAVENOUS at 11:26

## 2025-06-09 RX ADMIN — ONDANSETRON 4 MG: 2 INJECTION INTRAMUSCULAR; INTRAVENOUS at 08:31

## 2025-06-09 RX ADMIN — Medication 10 ML: at 08:32

## 2025-06-09 RX ADMIN — HYDROMORPHONE HYDROCHLORIDE 0.5 MG: 1 INJECTION, SOLUTION INTRAMUSCULAR; INTRAVENOUS; SUBCUTANEOUS at 08:31

## 2025-06-09 RX ADMIN — FOLIC ACID 1000 MCG: 1 TABLET ORAL at 08:31

## 2025-06-09 RX ADMIN — CEFAZOLIN 1000 MG: 2 INJECTION, POWDER, FOR SOLUTION INTRAMUSCULAR; INTRAVENOUS at 15:24

## 2025-06-09 RX ADMIN — CEFAZOLIN 2000 MG: 2 INJECTION, POWDER, FOR SOLUTION INTRAMUSCULAR; INTRAVENOUS at 13:12

## 2025-06-09 RX ADMIN — PROPOFOL 150 MG: 10 INJECTION, EMULSION INTRAVENOUS at 13:06

## 2025-06-09 RX ADMIN — FAMOTIDINE 40 MG: 20 TABLET, FILM COATED ORAL at 08:32

## 2025-06-09 RX ADMIN — NEOSTIGMINE METHYLSULFATE 3 MG: 0.5 INJECTION INTRAVENOUS at 15:52

## 2025-06-09 RX ADMIN — LOSARTAN POTASSIUM 100 MG: 50 TABLET, FILM COATED ORAL at 08:32

## 2025-06-09 RX ADMIN — ROSUVASTATIN 20 MG: 20 TABLET, FILM COATED ORAL at 08:32

## 2025-06-09 RX ADMIN — FAMOTIDINE 20 MG: 10 INJECTION, SOLUTION INTRAVENOUS at 13:27

## 2025-06-09 RX ADMIN — SODIUM CHLORIDE, POTASSIUM CHLORIDE, SODIUM LACTATE AND CALCIUM CHLORIDE 125 ML/HR: 600; 310; 30; 20 INJECTION, SOLUTION INTRAVENOUS at 10:50

## 2025-06-09 RX ADMIN — VALACYCLOVIR HYDROCHLORIDE 500 MG: 500 TABLET, FILM COATED ORAL at 08:32

## 2025-06-09 RX ADMIN — DEXAMETHASONE SODIUM PHOSPHATE 4 MG: 4 INJECTION, SOLUTION INTRA-ARTICULAR; INTRALESIONAL; INTRAMUSCULAR; INTRAVENOUS; SOFT TISSUE at 11:30

## 2025-06-09 RX ADMIN — GABAPENTIN 400 MG: 400 CAPSULE ORAL at 08:32

## 2025-06-09 RX ADMIN — HYDROMORPHONE HYDROCHLORIDE 0.5 MG: 1 INJECTION, SOLUTION INTRAMUSCULAR; INTRAVENOUS; SUBCUTANEOUS at 02:32

## 2025-06-09 RX ADMIN — ROCURONIUM BROMIDE 30 MG: 10 SOLUTION INTRAVENOUS at 13:06

## 2025-06-09 RX ADMIN — TRANEXAMIC ACID 1000 MG: 10 INJECTION, SOLUTION INTRAVENOUS at 13:16

## 2025-06-09 RX ADMIN — HYDROMORPHONE HYDROCHLORIDE 0.5 MG: 1 INJECTION, SOLUTION INTRAMUSCULAR; INTRAVENOUS; SUBCUTANEOUS at 00:13

## 2025-06-09 RX ADMIN — TRANEXAMIC ACID 1000 MG: 10 INJECTION, SOLUTION INTRAVENOUS at 15:48

## 2025-06-09 RX ADMIN — MORPHINE SULFATE 4 MG: 4 INJECTION, SOLUTION INTRAMUSCULAR; INTRAVENOUS at 23:18

## 2025-06-09 RX ADMIN — SODIUM CHLORIDE 125 ML/HR: 9 INJECTION, SOLUTION INTRAVENOUS at 17:26

## 2025-06-09 RX ADMIN — ROPIVACAINE HYDROCHLORIDE 100 MG: 5 INJECTION, SOLUTION EPIDURAL; INFILTRATION; PERINEURAL at 11:30

## 2025-06-09 RX ADMIN — KETOROLAC TROMETHAMINE 15 MG: 30 INJECTION, SOLUTION INTRAMUSCULAR; INTRAVENOUS at 21:12

## 2025-06-09 RX ADMIN — PHENYLEPHRINE HYDROCHLORIDE 200 MCG: 10 INJECTION INTRAVENOUS at 13:27

## 2025-06-09 NOTE — OP NOTE
Operative report Surgeon Ameya Myles    Preoperative diagnosis right proximal humerus 4 parts fracture dislocation  Postoperative diagnosis the same  Surgical procedure right shoulder reverse arthroplasty using DePuy system    After proper patient limb identification bring to the operating room general anesthesia beachchair sitting position chlorhexidine scrubbing and sterile draping proper timeout performed.  Anterior longitudinal incision deltopectoral interval retracting the cephalic vein with the deltoid.  Arriving at the fracture site.  The greater tuberosity is still attached with the rotator cuff.  Tagging it at the tendon bone junction with FiberWire #2 with 2 different sutures.  Retracting it posteriorly.  The lesser tuberosity is present as well and tagging it with FiberWire #2 at the tendon bone junction.  Humeral head is present impacted at the tip of the shaft of the humerus.  Removing the articular surface.  Irrigation thoroughly with sterile fluid.  The having a good access to the glenoid.  Removing remnant of the biceps tendon.  Then insertion of guidepin in the central slightly inferior portion reaming with the adequate reamer and impaction of the baseplate secured with the central screw 35 mm.  Inferior screw 25 mm and superior anterior superior posterior screw were inserted.  32 mm neutral +4 mm sphere was inserted.  Irrigation with sterile fluid.  Drilling hole through the shaft of the humerus to pass in the FiberWire.  Then broaching with the adequate broach.  Size large long stem.  With 30 degree of retroversion.  Trial with a 0 show very good fit and stability.  Definitive implant inserted in that fashion.  Then reapproximation of the tuberosity after reduction of the prosthesis.  To wrap the proximal body of the prosthesis.  Tying them together in a crisscross fashion.  With the sutures.  Then passing the suture through from the shaft to the tuberosity together and tying them together.   Very good reapproximation is present.  Irrigation with sterile fluid.  Deltopectoral interval closed with Vicryl 1 mattress suture skin closed Vicryl 1 Monocryl 2 oh and mL clip.  Large dressing was applied and patient returned to recovery in stable condition  Blood loss about 50 cc

## 2025-06-09 NOTE — ANESTHESIA PREPROCEDURE EVALUATION
Anesthesia Evaluation     Patient summary reviewed and Nursing notes reviewed   no history of anesthetic complications:   NPO Solid Status: > 8 hours  NPO Liquid Status: > 8 hours           Airway   Mallampati: II  TM distance: >3 FB  Neck ROM: full  No difficulty expected and Large neck circumference  Dental - normal exam     Pulmonary - normal exam    breath sounds clear to auscultation  (+) ,shortness of breath  Cardiovascular - normal exam    ECG reviewed  Patient on routine beta blocker and Beta blocker given within 24 hours of surgery  Rhythm: regular  Rate: normal    (+) hypertension, hyperlipidemia      Neuro/Psych- negative ROS  GI/Hepatic/Renal/Endo    (+) obesity, GERD    Musculoskeletal     Abdominal   (+) obese   Substance History - negative use     OB/GYN negative ob/gyn ROS         Other   arthritis,   history of cancer                  Anesthesia Plan    ASA 3     general and general with block   total IV anesthesia  (Right interscalene nerve block for post op pain control per surgeon request. )  intravenous induction     Anesthetic plan, risks, benefits, and alternatives have been provided, discussed and informed consent has been obtained with: patient.  Pre-procedure education provided  Plan discussed with CRNA.    CODE STATUS:    Code Status (Patient has no pulse and is not breathing): CPR (Attempt to Resuscitate)  Medical Interventions (Patient has pulse or is breathing): Full Support  Level Of Support Discussed With: Patient

## 2025-06-09 NOTE — ANESTHESIA PROCEDURE NOTES
Airway  Reason: elective    Date/Time: 6/9/2025 1:06 PM  Airway not difficult    General Information and Staff    Patient location during procedure: OR  CRNA/CAA: Stefan Mary CRNA    Indications and Patient Condition  Indications for airway management: airway protection    Preoxygenated: yes  MILS maintained throughout    Mask difficulty assessment: 1 - vent by mask    Final Airway Details    Final airway type: endotracheal airway      Successful airway: ETT  Cuffed: yes   Successful intubation technique: direct laryngoscopy  Adjuncts used in placement: intubating stylet  Endotracheal tube insertion site: oral  Blade: Rigo  Blade size: 3  ETT size (mm): 7.0  Cormack-Lehane Classification: grade I - full view of glottis  Placement verified by: chest auscultation and capnometry   Measured from: lips  ETT/EBT  to lips (cm): 20  Number of attempts at approach: 1  Assessment: lips, teeth, and gum same as pre-op and atraumatic intubation

## 2025-06-09 NOTE — PLAN OF CARE
Goal Outcome Evaluation:    Pt received back from surgery. Pt is resting in bed with no s/s of distress noted. Surgical dressing to R shoulder maintained, dressing is C/D/I. Polar pack in place per ortho note. Sling in place to R shoulder. VSS. IV access maintained, fluids infusing per order. Splint removed to R ankle/foot per ortho order, walking boot obtained and at bedside. Pt will need CAM boot applied while weight bearing. Will continue to follow plan of care.

## 2025-06-09 NOTE — PLAN OF CARE
Goal Outcome Evaluation:   Pt resting in bed this shift. Pt c/o pain throughout shift, see MAR. Pt educated on NPO status, verbalizes understanding. No further complaints voiced at this time. No visible acute s/s of distress noted. Plan of care ongoing.

## 2025-06-09 NOTE — ANESTHESIA POSTPROCEDURE EVALUATION
Patient: Tiara Stubbs    Procedure Summary       Date: 06/09/25 Room / Location: [facility] OR  /  [facility] OR    Anesthesia Start: 1302 Anesthesia Stop: 1622    Procedure: TOTAL SHOULDER REVERSE ARTHROPLASTY (Right: Shoulder) Diagnosis:       Shoulder fracture, right, closed, initial encounter      Closed fracture of right foot, initial encounter      Acute right ankle pain      (Shoulder fracture, right, closed, initial encounter [S42.91XA])      (Closed fracture of right foot, initial encounter [S92.901A])      (Acute right ankle pain [M25.571])    Surgeons: Ameya Carlin MD Provider: Taz Antoine DO    Anesthesia Type: general, general with block ASA Status: 3            Anesthesia Type: general, general with block    Vitals  Vitals Value Taken Time   /74 06/09/25 16:29   Temp 98.6    Pulse 105 06/09/25 16:33   Resp 16    SpO2 92 % 06/09/25 16:33   Vitals shown include unfiled device data.        Post Anesthesia Care and Evaluation    Patient location during evaluation: PHASE II  Patient participation: complete - patient participated  Level of consciousness: awake and alert  Pain score: 1  Pain management: adequate    Airway patency: patent  Anesthetic complications: No anesthetic complications  PONV Status: controlled  Cardiovascular status: acceptable  Respiratory status: acceptable  Hydration status: acceptable

## 2025-06-09 NOTE — PROGRESS NOTES
[facility] HOSPITALIST PROGRESS NOTE     Patient Identification:  Name:  Tiara Stubbs  Age:  80 y.o.  Sex:  female  :  1945  MRN:  [id number]  Visit Number:  [id number]  ROOM: [address] [place] [address]     Primary Care Provider:  Paula Downey APRN    Length of stay in inpatient status:  2    Subjective     Chief Compliant:  No chief complaint on file.      History of Presenting Illness:    Patient seen postoperatively. She reported some nausea but was resting comfortably and sleeping on my initial observation. She woke up and was interactive. No family bedside. Still drowsy from sedation. Vitals stable.     ROS:  Otherwise 10 point ROS negative other than documented above in HPI.     Objective     Current Hospital Meds:ceFAZolin, 2,000 mg, Intravenous, Q8H  cholecalciferol, 2,000 Units, Oral, Daily  cyclobenzaprine, 10 mg, Oral, Nightly  cyclobenzaprine, 10 mg, Oral, TID  famotidine, 20 mg, Oral, BID AC  famotidine, 40 mg, Oral, Daily  folic acid, 1,000 mcg, Oral, Daily  gabapentin, 400 mg, Oral, TID  losartan, 100 mg, Oral, Q24H  metoprolol succinate XL, 25 mg, Oral, Daily  predniSONE, 4 mg, Oral, Daily  rosuvastatin, 20 mg, Oral, Daily  senna-docusate sodium, 2 tablet, Oral, BID  sodium chloride, 10 mL, Intravenous, Q12H  valACYclovir, 500 mg, Oral, Daily    sodium chloride, 125 mL/hr, Last Rate: 125 mL/hr (25 1726)        Current Antimicrobial Therapy:  Anti-Infectives (From admission, onward)      Ordered     Dose/Rate Route Frequency Start Stop    25 1709  ceFAZolin 2000 mg IVPB in 100 mL NS (VTB)        Ordering Provider: Ameya Carlin MD    2,000 mg  over 30 Minutes Intravenous Every 8 Hours 25 2300 06/10/25 1459    25 1219  sodium chloride 3,000 mL with polymyxin B 1,500,000 Units, vancomycin 3,000 mg irrigation        Ordering Provider: Ameya Carlin MD     Irrigation Once 25 1230 25 1332    25 1050  ceFAZolin 2000 mg IVPB in 100 mL NS  (VTB)        Ordering Provider: Ameya Carlin MD    2,000 mg  over 30 Minutes Intravenous Once 06/09/25 1052 06/09/25 1524    06/07/25 2021  valACYclovir (VALTREX) tablet 500 mg        Ordering Provider: Ameya Carlin MD    500 mg Oral Daily 06/08/25 0900 06/08/26 0859          Current Diuretic Therapy:  Diuretics (From admission, onward)      None          ----------------------------------------------------------------------------------------------------------------------  Vital Signs:  Temp:  [96.1 °F (35.6 °C)-98.1 °F (36.7 °C)] 96.1 °F (35.6 °C)  Heart Rate:  [] 97  Resp:  [14-22] 20  BP: (121-151)/(56-76) 127/56  SpO2:  [88 %-96 %] 96 %  on  Flow (L/min) (Oxygen Therapy):  [2-4] 2;   Device (Oxygen Therapy): nasal cannula  Body mass index is 29.91 kg/m².    Wt Readings from Last 3 Encounters:   06/09/25 86.6 kg (191 lb)   12/05/24 89 kg (196 lb 3.2 oz)   05/23/24 88.8 kg (195 lb 12.8 oz)     Intake & Output (last 3 days)         06/06 0701 06/07 0700 06/07 0701 06/08 0700 06/08 0701 06/09 0700 06/09 0701  06/10 0700    P.O.   600 60    I.V. (mL/kg)    1000 (11.5)    IV Piggyback    200    Total Intake(mL/kg)   600 (6.9) 1260 (14.5)    Urine (mL/kg/hr)  1350 700 (0.3) 550 (0.6)    Stool  0      Total Output  1350 700 550    Net  -1350 -100 +710                  Diet: Regular/House; Fluid Consistency: Thin (IDDSI 0)  ----------------------------------------------------------------------------------------------------------------------  Physical exam:  Constitutional:  Well-developed and well-nourished.  No respiratory distress.      HENT:  Head:  Normocephalic and atraumatic.  Mouth:  Moist mucous membranes.    Eyes:  Conjunctivae and EOM are normal. No scleral icterus.    Neck:  Neck supple.  No JVD present.    Cardiovascular:  Normal rate, regular rhythm and normal heart sounds with no murmur.  Pulmonary/Chest:  No respiratory distress, no wheezes, no crackles, with normal breath sounds and  "good air movement.  Abdominal:  Soft.  Bowel sounds are normal.  No distension and no tenderness.   Musculoskeletal:  Large proximal RUE bandage in place.   Neurological:  Alert and oriented to person  Skin:  Skin is warm and dry. No rash noted. No pallor.   Peripheral vascular:  Pulses in all 4 extremities with no clubbing, no cyanosis, no edema.  ----------------------------------------------------------------------------------------------------------------------  Tele:    ----------------------------------------------------------------------------------------------------------------------  Results from last 7 days   Lab Units 06/09/25  0722 06/09/25  0539 06/07/25  1120   WBC 10*3/mm3 17.80*  --  11.17*   HEMOGLOBIN g/dL 12.7  --  12.3   HEMATOCRIT % 39.6  --  37.8   MCV fL 102.9*  --  101.3*   MCHC g/dL 32.1  --  32.5   PLATELETS 10*3/mm3 183  --  158   INR   --  1.04  --          Results from last 7 days   Lab Units 06/09/25  0722 06/07/25  1120   SODIUM mmol/L 137 143   POTASSIUM mmol/L 4.1 3.9   MAGNESIUM mg/dL 2.4  --    CHLORIDE mmol/L 105 108*   CO2 mmol/L 18.9* 22.9   BUN mg/dL 16.7 19.5   CREATININE mg/dL 0.78 0.83   CALCIUM mg/dL 8.8 8.7   GLUCOSE mg/dL 100* 103*   ALBUMIN g/dL  --  3.8   BILIRUBIN mg/dL  --  0.6   ALK PHOS U/L  --  60   AST (SGOT) U/L  --  87*   ALT (SGPT) U/L  --  49*   Estimated Creatinine Clearance: 65 mL/min (by C-G formula based on SCr of 0.78 mg/dL).  No results found for: \"AMMONIA\"              No results found for: \"HGBA1C\", \"POCGLU\"  No results found for: \"TSH\", \"FREET4\"  No results found for: \"PREGTESTUR\", \"PREGSERUM\", \"HCG\", \"HCGQUANT\"  Pain Management Panel           No data to display              Brief Urine Lab Results       None          No results found for: \"BLOODCX\"  No results found for: \"URINECX\"  No results found for: \"WOUNDCX\"  No results found for: \"STOOLCX\"  No results found for: \"RESPCX\"  No results found for: \"AFBCX\"        I have personally looked at the " labs and they are summarized above.  ----------------------------------------------------------------------------------------------------------------------  Detailed radiology reports for the last 24 hours:    Imaging Results (Last 24 Hours)       Procedure Component Value Units Date/Time    XR Shoulder 2+ View Right [[id number]] Collected: 06/09/25 1636     Updated: 06/09/25 1639    Narrative:      EXAM:    XR Right Shoulder Complete, 2 or More Views     EXAM DATE:    6/9/2025 4:36 PM     CLINICAL HISTORY:    Post-Op 1 view AP only; S42.91XA-Fracture of right shoulder girdle,  part unspecified, initial encounter for closed fracture;  S92.901A-Unspecified fracture of right foot, initial encounter for  closed fracture; M25.571-Pain in right ankle and joints of right foot;  Z98.890-Other specified postprocedural states     TECHNIQUE:    Two or more views of the right shoulder.     COMPARISON:    No relevant prior studies available.     FINDINGS:    Bones/joints:  Right total shoulder arthroplasty.  Components appear  well seated.  No acute fracture.  No dislocation.    Soft tissues:  Unremarkable as visualized.       Impression:        No acute findings in the right shoulder.     This report was finalized on 6/9/2025 4:37 PM by Dr. Aime Haywood MD.             Assessment & Plan    #Right humerus fracture and right shoulder dislocation secondary to mechanical ground level fall  #Right fifth metatarsal fracture  #Hyperlipidemia - continue rosuvastatin  #RA  #Osteoarthritis  #Lupus  #Hypertension - BP well controlled. Continue home metoprolol and losartan.  #GERD - continue famotidine  #VERONIQUE  #Leukocytosis   #Human rhinovirus   #Mild hepatocellular transaminitis - Get hepatitis panel and repeat Ari     POD 0 total reverse shoulder arthroplasty. Repeat labs in AM. PRN zofran. PRN pain control. PT/OT to evaluate tomorrow. Suspect Leukocytosis reactive, patient has been afebrile.     Code status: Full     Pending PT/OT  postoperatively        Simone Coelho MD  [facility]  06/09/25  18:06 EDT

## 2025-06-09 NOTE — PROGRESS NOTES
Interval History:     Tiara is an 80 year old female being followed by orthopedics due to a right proximal humerus fracture and right foot fracture. Patient is currently resting quietly in bed at this time. She states that she is having moderate pain predominately to the shoulder. She is awaiting surgical intervention at this time. No acute events were noted overnight.         Objective     Vital Signs  Temp:  [97.4 °F (36.3 °C)-98.7 °F (37.1 °C)] 97.9 °F (36.6 °C)  Heart Rate:  [] 114  Resp:  [16-20] 18  BP: (121-151)/(58-76) 132/61    Labs & Rads  Lab Results (last 72 hours)       Procedure Component Value Units Date/Time    Magnesium [[id number]]  (Normal) Collected: 06/09/25 0722    Specimen: Blood Updated: 06/09/25 0923     Magnesium 2.4 mg/dL     Basic Metabolic Panel [[id number]]  (Abnormal) Collected: 06/09/25 0722    Specimen: Blood Updated: 06/09/25 0814     Glucose 100 mg/dL      BUN 16.7 mg/dL      Creatinine 0.78 mg/dL      Sodium 137 mmol/L      Potassium 4.1 mmol/L      Comment: Specimen slightly hemolyzed.  Result may be falsely elevated.        Chloride 105 mmol/L      CO2 18.9 mmol/L      Calcium 8.8 mg/dL      BUN/Creatinine Ratio 21.4     Anion Gap 13.1 mmol/L      eGFR 76.9 mL/min/1.73     Narrative:      GFR Categories in Chronic Kidney Disease (CKD)              GFR Category          GFR (mL/min/1.73)    Interpretation  G1                    90 or greater        Normal or high (1)  G2                    60-89                Mild decrease (1)  G3a                   45-59                Mild to moderate decrease  G3b                   30-44                Moderate to severe decrease  G4                    15-29                Severe decrease  G5                    14 or less           Kidney failure    (1)In the absence of evidence of kidney disease, neither GFR category G1 or G2 fulfill the criteria for CKD.    eGFR calculation 2021 CKD-EPI creatinine equation, which does not include  race as a factor    CBC & Differential [[id number]]  (Abnormal) Collected: 06/09/25 0722    Specimen: Blood Updated: 06/09/25 0743    Narrative:      The following orders were created for panel order CBC & Differential.  Procedure                               Abnormality         Status                     ---------                               -----------         ------                     CBC Auto Differential[[id number]]        Abnormal            Final result                 Please view results for these tests on the individual orders.    CBC Auto Differential [[id number]]  (Abnormal) Collected: 06/09/25 0722    Specimen: Blood Updated: 06/09/25 0743     WBC 17.80 10*3/mm3      RBC 3.85 10*6/mm3      Hemoglobin 12.7 g/dL      Hematocrit 39.6 %      .9 fL      MCH 33.0 pg      MCHC 32.1 g/dL      RDW 14.1 %      RDW-SD 53.8 fl      MPV 11.2 fL      Platelets 183 10*3/mm3      Neutrophil % 69.6 %      Lymphocyte % 16.0 %      Monocyte % 13.1 %      Eosinophil % 0.3 %      Basophil % 0.3 %      Immature Grans % 0.7 %      Neutrophils, Absolute 12.37 10*3/mm3      Lymphocytes, Absolute 2.85 10*3/mm3      Monocytes, Absolute 2.33 10*3/mm3      Eosinophils, Absolute 0.06 10*3/mm3      Basophils, Absolute 0.06 10*3/mm3      Immature Grans, Absolute 0.13 10*3/mm3      nRBC 0.0 /100 WBC     Protime-INR [[id number]]  (Normal) Collected: 06/09/25 0539    Specimen: Blood Updated: 06/09/25 0649     Protime 14.2 Seconds      INR 1.04    Narrative:      Suggested INR therapeutic range for stable oral anticoagulant therapy:    Low Intensity therapy:   1.5-2.0  Moderate Intensity therapy:   2.0-3.0  High Intensity therapy:   2.5-4.0    Respiratory Panel PCR w/COVID-19(SARS-CoV-2) TERESITA/ABDIRAHMAN/TAYLOR/[facility] In-House, NP Swab in [facility], 2 HR TAT - Swab, Nasopharynx [[id number]]  (Abnormal) Collected: 06/07/25 1225    Specimen: Swab from Nasopharynx Updated: 06/07/25 1318     ADENOVIRUS, PCR Not Detected     Coronavirus 229E Not  Detected     Coronavirus HKU1 Not Detected     Coronavirus NL63 Not Detected     Coronavirus OC43 Not Detected     COVID19 Not Detected     Human Metapneumovirus Not Detected     Human Rhinovirus/Enterovirus Detected     Influenza A PCR Not Detected     Influenza B PCR Not Detected     Parainfluenza Virus 1 Not Detected     Parainfluenza Virus 2 Not Detected     Parainfluenza Virus 3 Not Detected     Parainfluenza Virus 4 Not Detected     RSV, PCR Not Detected     Bordetella pertussis pcr Not Detected     Bordetella parapertussis PCR Not Detected     Chlamydophila pneumoniae PCR Not Detected     Mycoplasma pneumo by PCR Not Detected    Narrative:      In the setting of a positive respiratory panel with a viral infection PLUS a negative procalcitonin without other underlying concern for bacterial infection, consider observing off antibiotics or discontinuation of antibiotics and continue supportive care. If the respiratory panel is positive for atypical bacterial infection (Bordetella pertussis, Chlamydophila pneumoniae, or Mycoplasma pneumoniae), consider antibiotic de-escalation to target atypical bacterial infection.    Comprehensive Metabolic Panel [[id number]]  (Abnormal) Collected: 06/07/25 1120    Specimen: Blood Updated: 06/07/25 1151     Glucose 103 mg/dL      BUN 19.5 mg/dL      Creatinine 0.83 mg/dL      Sodium 143 mmol/L      Potassium 3.9 mmol/L      Chloride 108 mmol/L      CO2 22.9 mmol/L      Calcium 8.7 mg/dL      Total Protein 6.4 g/dL      Albumin 3.8 g/dL      ALT (SGPT) 49 U/L      AST (SGOT) 87 U/L      Alkaline Phosphatase 60 U/L      Total Bilirubin 0.6 mg/dL      Globulin 2.6 gm/dL      A/G Ratio 1.5 g/dL      BUN/Creatinine Ratio 23.5     Anion Gap 12.1 mmol/L      eGFR 71.4 mL/min/1.73     Narrative:      GFR Categories in Chronic Kidney Disease (CKD)              GFR Category          GFR (mL/min/1.73)    Interpretation  G1                    90 or greater        Normal or high (1)  G2                     60-89                Mild decrease (1)  G3a                   45-59                Mild to moderate decrease  G3b                   30-44                Moderate to severe decrease  G4                    15-29                Severe decrease  G5                    14 or less           Kidney failure    (1)In the absence of evidence of kidney disease, neither GFR category G1 or G2 fulfill the criteria for CKD.    eGFR calculation 2021 CKD-EPI creatinine equation, which does not include race as a factor    CBC & Differential [[id number]]  (Abnormal) Collected: 06/07/25 1120    Specimen: Blood Updated: 06/07/25 1129    Narrative:      The following orders were created for panel order CBC & Differential.  Procedure                               Abnormality         Status                     ---------                               -----------         ------                     CBC Auto Differential[[id number]]        Abnormal            Final result                 Please view results for these tests on the individual orders.    CBC Auto Differential [[id number]]  (Abnormal) Collected: 06/07/25 1120    Specimen: Blood Updated: 06/07/25 1129     WBC 11.17 10*3/mm3      RBC 3.73 10*6/mm3      Hemoglobin 12.3 g/dL      Hematocrit 37.8 %      .3 fL      MCH 33.0 pg      MCHC 32.5 g/dL      RDW 14.3 %      RDW-SD 52.9 fl      MPV 11.2 fL      Platelets 158 10*3/mm3      Neutrophil % 70.7 %      Lymphocyte % 15.9 %      Monocyte % 11.0 %      Eosinophil % 1.3 %      Basophil % 0.4 %      Immature Grans % 0.7 %      Neutrophils, Absolute 7.89 10*3/mm3      Lymphocytes, Absolute 1.78 10*3/mm3      Monocytes, Absolute 1.23 10*3/mm3      Eosinophils, Absolute 0.15 10*3/mm3      Basophils, Absolute 0.04 10*3/mm3      Immature Grans, Absolute 0.08 10*3/mm3      nRBC 0.0 /100 WBC           Imaging Results (Last 72 Hours)       Procedure Component Value Units Date/Time    XR Shoulder 2+ View Right [[id number]]  Collected: 06/08/25 1531     Updated: 06/08/25 1534    Narrative:      INDICATION: Pain and injury.     TECHNIQUE: 3 views of the right shoulder.     COMPARISON: No relevant priors.       Impression:      FINDINGS/IMPRESSION: Comminuted, displaced humeral head/neck fracture.  Anterior/inferior dislocation of the right shoulder. Soft tissue  swelling.     This report was finalized on 6/8/2025 3:32 PM by Alex Pallas, DO.       XR Foot 3+ View Right [[id number]] Collected: 06/08/25 1530     Updated: 06/08/25 1533    Narrative:      INDICATION: Pain and injury.     TECHNIQUE: 3 views of the right foot. 3 views of the right ankle     COMPARISON: No relevant priors.     FINDINGS:   Evaluation of the right ankle and the right foot.     Mildly displaced fracture of the proximal aspect of the fifth  metatarsal. Mildly displaced distal fibular fracture. Remaining osseous  structures intact. No dislocation. No lytic or blastic bony lesions  seen. Mild diffuse joint space loss.     Soft tissue swelling.       Impression:      Mildly displaced fracture of the proximal aspect of the fifth  metatarsal. Mildly displaced distal fibular fracture.     This report was finalized on 6/8/2025 3:31 PM by Alex Pallas, DO.       XR Ankle 3+ View Right [[id number]] Collected: 06/08/25 1530     Updated: 06/08/25 1533    Narrative:      INDICATION: Pain and injury.     TECHNIQUE: 3 views of the right foot. 3 views of the right ankle     COMPARISON: No relevant priors.     FINDINGS:   Evaluation of the right ankle and the right foot.     Mildly displaced fracture of the proximal aspect of the fifth  metatarsal. Mildly displaced distal fibular fracture. Remaining osseous  structures intact. No dislocation. No lytic or blastic bony lesions  seen. Mild diffuse joint space loss.     Soft tissue swelling.       Impression:      Mildly displaced fracture of the proximal aspect of the fifth  metatarsal. Mildly displaced distal fibular fracture.     This  report was finalized on 6/8/2025 3:31 PM by Alex Pallas, DO.       XR Chest 1 View [[id number]] Collected: 06/08/25 1334     Updated: 06/08/25 1340    Narrative:      PROCEDURE: Chest x-ray examination performed on June 7, 2025. Single  view.     HISTORY: Cough.     COMPARISON: None.     FINDINGS:     Mild enlarged heart size  Coarsened bronchovascular pattern to the lungs.  Right humeral head and neck fracture with anterior dislocation of the  humeral head.  No free air in the upper abdomen.  Slight levoconvex curve at the mid thoracic spine.       Impression:         Mild enlarged heart size.  Hypoinflated lungs.  Right humeral head and neck fracture with anterior dislocation.  No pleural effusion. No pneumothorax.  Mild levoconvex curve at the mid thoracic spine.     This report was finalized on 6/8/2025 1:38 PM by Bobby Bright MD.                   Physical Exam:    Constitutional: patient is alert and oriented. No acute distress noted.     Musculoskeletal: Upon examination of the right shoulder, there is mild edema and ecchymosis.  No erythema, open wounds, drainage, or signs of an infectious process. Generalized tenderness.  She is able to flex extend the elbow.  She is unable to flex or extend the wrist due to a prior fusion.  She is able to flex and extend the digits. Patient maintains a brisk capillary refill and sensation is intact distally.  Radial pulse present.  Upon examination of the right foot, there is a short leg splint in place. No edema, erythema, ecchymosis, or edema to the surrounding tissues.  Tissues are soft.  She is able to flex and extend the digits.  Capillary refill is brisk and light touch sensation is intact distally.          Assessment:  Right proximal humerus fracture with shoulder dislocation     Right foot fracture          Plan:  Patient is to undergo surgical intervention to the shoulder today.     Continue with pain control.     Maintain NPO.     Nonweightbearing to the right  upper extremity.      Maintain a sling to the right upper extremity.    Patient may be transitioned to a short CAM boot to the right lower extremity and weight bear as tolerated.     Orthopedic surgery following.    Discharge planning to be determined.         DIEGO Kent  06/09/25  09:52 EDT

## 2025-06-09 NOTE — ANESTHESIA PROCEDURE NOTES
Peripheral Block    Pre-sedation assessment completed: 6/9/2025 11:25 AM    Patient reassessed immediately prior to procedure    Patient location during procedure: pre-op  Start time: 6/9/2025 11:26 AM  Stop time: 6/9/2025 11:30 AM  Reason for block: procedure for pain, at surgeon's request and post-op pain management  Performed by  Anesthesiologist: Taz Antoine DO  Preanesthetic Checklist  Completed: patient identified, IV checked, site marked, risks and benefits discussed, surgical consent, monitors and equipment checked, pre-op evaluation and timeout performed  Prep:  Sterile barriers:gloves and mask  Prep: alcohol swabs and ChloraPrep  Patient monitoring: blood pressure monitoring, continuous pulse oximetry and EKG  Procedure    Sedation: yes  Performed under: local infiltration  Guidance:ultrasound guided, nerve stimulator and landmark technique    ULTRASOUND INTERPRETATION.  Using ultrasound guidance a gauge needle was placed in close proximity to the brachial plexus nerve, at which point, under ultrasound guidance anesthetic was injected in the area of the nerve and spread of the anesthesia was seen on ultrasound in close proximity thereto.  There were no abnormalities seen on ultrasound; a digital image was taken; and the patient tolerated the procedure with no complications. Images:still images obtained, printed/placed on chart  Loss of twitch: 0.5 mA  Laterality:right  Block Type:interscalene  Injection Technique:single-shot  Needle Type:short-bevel  Needle Gauge:22 G  Resistance on Injection: none    Medications Used: dexamethasone (DECADRON) injection - Injection   4 mg - 6/9/2025 11:30:00 AM  ropivacaine (NAROPIN) injection 0.5 % - Peripheral Nerve   100 mg - 6/9/2025 11:30:00 AM      Post Assessment  Injection Assessment: negative aspiration for heme, no paresthesia on injection and incremental injection  Patient Tolerance:comfortable throughout block  Complications:no  Performed by: Taz Antoine  DO

## 2025-06-09 NOTE — BRIEF OP NOTE
TOTAL SHOULDER REVERSE ARTHROPLASTY  Progress Note    Tiara Stubbs  6/9/2025    Pre-op Diagnosis: Fracture dislocation location 4 parts right proximal humerus  Shoulder fracture, right, closed, initial encounter [S42.91XA]  Closed fracture of right foot, initial encounter [S92.901A]  Acute right ankle pain [M25.571]       Post-Op Diagnosis Codes:     * Shoulder fracture, right, closed, initial encounter [S42.91XA]     * Closed fracture of right foot, initial encounter [S92.901A]     * Acute right ankle pain [M25.571]    Procedure(s):      Procedure(s):  TOTAL SHOULDER REVERSE ARTHROPLASTY        SURGICAL APPROACH: Deltopectoral    SURGICAL TECHNIQUE: Peel off      Surgeon(s):  Ameya Carlin MD    Anesthesia: Choice    Staff:   Circulator: Julián Seaman RN; Michelle Vincent RN  Scrub Person: Keke Hardwick; Jb Ortiz  Vendor Representative: Tomas Crespo       Estimated Blood Loss: 50    Urine Voided: * No values recorded between 6/9/2025  1:01 PM and 6/9/2025  3:19 PM *    Specimens:                None      Drains:   Urethral Catheter Silicone 16 Fr. (Active)   Daily Indications Acute Urinary Retention 06/09/25 0745   Site Assessment Clean;Skin intact 06/09/25 1046   Collection Container Standard drainage bag 06/09/25 1046   Securement Method Securing device 06/09/25 0745   Catheter care complete Yes 06/09/25 0745   Output (mL) 250 mL 06/09/25 0745       Findings: 4 parts proximal humerus fracture dislocation      Complications: None          Ameya Carlin MD     Date: 6/9/2025  Time: 15:36 EDT

## 2025-06-10 ENCOUNTER — APPOINTMENT (OUTPATIENT)
Dept: GENERAL RADIOLOGY | Facility: HOSPITAL | Age: 80
End: 2025-06-10
Payer: MEDICARE

## 2025-06-10 LAB
ALBUMIN SERPL-MCNC: 3.1 G/DL (ref 3.5–5.2)
ALBUMIN/GLOB SERPL: 1.1 G/DL
ALP SERPL-CCNC: 46 U/L (ref 39–117)
ALT SERPL W P-5'-P-CCNC: 18 U/L (ref 1–33)
ANION GAP SERPL CALCULATED.3IONS-SCNC: 11.7 MMOL/L (ref 5–15)
AST SERPL-CCNC: 42 U/L (ref 1–32)
BACTERIA UR QL AUTO: ABNORMAL /HPF
BASOPHILS # BLD AUTO: 0.04 10*3/MM3 (ref 0–0.2)
BASOPHILS NFR BLD AUTO: 0.2 % (ref 0–1.5)
BILIRUB SERPL-MCNC: 0.4 MG/DL (ref 0–1.2)
BILIRUB UR QL STRIP: NEGATIVE
BUN SERPL-MCNC: 24.9 MG/DL (ref 8–23)
BUN/CREAT SERPL: 19.6 (ref 7–25)
CALCIUM SPEC-SCNC: 8 MG/DL (ref 8.6–10.5)
CHLORIDE SERPL-SCNC: 107 MMOL/L (ref 98–107)
CLARITY UR: CLEAR
CO2 SERPL-SCNC: 18.3 MMOL/L (ref 22–29)
COLOR UR: YELLOW
CREAT SERPL-MCNC: 1.27 MG/DL (ref 0.57–1)
DEPRECATED RDW RBC AUTO: 51.9 FL (ref 37–54)
EGFRCR SERPLBLD CKD-EPI 2021: 42.8 ML/MIN/1.73
EOSINOPHIL # BLD AUTO: 0 10*3/MM3 (ref 0–0.4)
EOSINOPHIL NFR BLD AUTO: 0 % (ref 0.3–6.2)
ERYTHROCYTE [DISTWIDTH] IN BLOOD BY AUTOMATED COUNT: 13.8 % (ref 12.3–15.4)
GLOBULIN UR ELPH-MCNC: 2.8 GM/DL
GLUCOSE SERPL-MCNC: 121 MG/DL (ref 65–99)
GLUCOSE UR STRIP-MCNC: NEGATIVE MG/DL
HAV IGM SERPL QL IA: NORMAL
HBV CORE IGM SERPL QL IA: NORMAL
HBV SURFACE AG SERPL QL IA: NORMAL
HCT VFR BLD AUTO: 31.9 % (ref 34–46.6)
HCV AB SER QL: NORMAL
HGB BLD-MCNC: 10 G/DL (ref 12–15.9)
HGB UR QL STRIP.AUTO: ABNORMAL
HYALINE CASTS UR QL AUTO: ABNORMAL /LPF
IMM GRANULOCYTES # BLD AUTO: 0.13 10*3/MM3 (ref 0–0.05)
IMM GRANULOCYTES NFR BLD AUTO: 0.6 % (ref 0–0.5)
KETONES UR QL STRIP: NEGATIVE
LEUKOCYTE ESTERASE UR QL STRIP.AUTO: ABNORMAL
LYMPHOCYTES # BLD AUTO: 2.02 10*3/MM3 (ref 0.7–3.1)
LYMPHOCYTES NFR BLD AUTO: 9.8 % (ref 19.6–45.3)
MCH RBC QN AUTO: 32.4 PG (ref 26.6–33)
MCHC RBC AUTO-ENTMCNC: 31.3 G/DL (ref 31.5–35.7)
MCV RBC AUTO: 103.2 FL (ref 79–97)
MONOCYTES # BLD AUTO: 2.5 10*3/MM3 (ref 0.1–0.9)
MONOCYTES NFR BLD AUTO: 12.1 % (ref 5–12)
NEUTROPHILS NFR BLD AUTO: 16 10*3/MM3 (ref 1.7–7)
NEUTROPHILS NFR BLD AUTO: 77.3 % (ref 42.7–76)
NITRITE UR QL STRIP: NEGATIVE
NRBC BLD AUTO-RTO: 0 /100 WBC (ref 0–0.2)
PH UR STRIP.AUTO: 6 [PH] (ref 5–8)
PLATELET # BLD AUTO: 162 10*3/MM3 (ref 140–450)
PMV BLD AUTO: 11.1 FL (ref 6–12)
POTASSIUM SERPL-SCNC: 4 MMOL/L (ref 3.5–5.2)
PROT SERPL-MCNC: 5.9 G/DL (ref 6–8.5)
PROT UR QL STRIP: ABNORMAL
QT INTERVAL: 376 MS
QTC INTERVAL: 439 MS
RBC # BLD AUTO: 3.09 10*6/MM3 (ref 3.77–5.28)
RBC # UR STRIP: ABNORMAL /HPF
REF LAB TEST METHOD: ABNORMAL
SODIUM SERPL-SCNC: 137 MMOL/L (ref 136–145)
SP GR UR STRIP: 1.02 (ref 1–1.03)
SQUAMOUS #/AREA URNS HPF: ABNORMAL /HPF
UROBILINOGEN UR QL STRIP: ABNORMAL
WBC # UR STRIP: ABNORMAL /HPF
WBC NRBC COR # BLD AUTO: 20.69 10*3/MM3 (ref 3.4–10.8)

## 2025-06-10 PROCEDURE — 25810000003 SODIUM CHLORIDE 0.9 % SOLUTION: Performed by: INTERNAL MEDICINE

## 2025-06-10 PROCEDURE — 25010000002 DOXYCYCLINE 100 MG RECONSTITUTED SOLUTION 1 EACH VIAL: Performed by: INTERNAL MEDICINE

## 2025-06-10 PROCEDURE — 87086 URINE CULTURE/COLONY COUNT: CPT | Performed by: INTERNAL MEDICINE

## 2025-06-10 PROCEDURE — 81001 URINALYSIS AUTO W/SCOPE: CPT | Performed by: INTERNAL MEDICINE

## 2025-06-10 PROCEDURE — 71045 X-RAY EXAM CHEST 1 VIEW: CPT | Performed by: RADIOLOGY

## 2025-06-10 PROCEDURE — 63710000001 PREDNISONE PER 5 MG: Performed by: ORTHOPAEDIC SURGERY

## 2025-06-10 PROCEDURE — 25010000002 CEFTRIAXONE PER 250 MG: Performed by: INTERNAL MEDICINE

## 2025-06-10 PROCEDURE — 97162 PT EVAL MOD COMPLEX 30 MIN: CPT

## 2025-06-10 PROCEDURE — 85025 COMPLETE CBC W/AUTO DIFF WBC: CPT | Performed by: INTERNAL MEDICINE

## 2025-06-10 PROCEDURE — 25010000002 CEFAZOLIN PER 500 MG: Performed by: ORTHOPAEDIC SURGERY

## 2025-06-10 PROCEDURE — 25010000002 KETOROLAC TROMETHAMINE PER 15 MG: Performed by: ORTHOPAEDIC SURGERY

## 2025-06-10 PROCEDURE — 97166 OT EVAL MOD COMPLEX 45 MIN: CPT

## 2025-06-10 PROCEDURE — 94761 N-INVAS EAR/PLS OXIMETRY MLT: CPT

## 2025-06-10 PROCEDURE — 94799 UNLISTED PULMONARY SVC/PX: CPT

## 2025-06-10 PROCEDURE — 25010000002 MORPHINE PER 10 MG: Performed by: ORTHOPAEDIC SURGERY

## 2025-06-10 PROCEDURE — 80074 ACUTE HEPATITIS PANEL: CPT | Performed by: INTERNAL MEDICINE

## 2025-06-10 PROCEDURE — 71045 X-RAY EXAM CHEST 1 VIEW: CPT

## 2025-06-10 PROCEDURE — 80053 COMPREHEN METABOLIC PANEL: CPT | Performed by: INTERNAL MEDICINE

## 2025-06-10 PROCEDURE — 99232 SBSQ HOSP IP/OBS MODERATE 35: CPT | Performed by: INTERNAL MEDICINE

## 2025-06-10 PROCEDURE — 25010000002 HEPARIN (PORCINE) PER 1000 UNITS: Performed by: INTERNAL MEDICINE

## 2025-06-10 RX ORDER — HEPARIN SODIUM 5000 [USP'U]/ML
5000 INJECTION, SOLUTION INTRAVENOUS; SUBCUTANEOUS EVERY 8 HOURS SCHEDULED
Status: DISCONTINUED | OUTPATIENT
Start: 2025-06-10 | End: 2025-06-17 | Stop reason: HOSPADM

## 2025-06-10 RX ORDER — SODIUM CHLORIDE 9 MG/ML
100 INJECTION, SOLUTION INTRAVENOUS CONTINUOUS
Status: ACTIVE | OUTPATIENT
Start: 2025-06-10 | End: 2025-06-10

## 2025-06-10 RX ORDER — SODIUM CHLORIDE 9 MG/ML
125 INJECTION, SOLUTION INTRAVENOUS CONTINUOUS
Status: ACTIVE | OUTPATIENT
Start: 2025-06-10 | End: 2025-06-10

## 2025-06-10 RX ORDER — SODIUM CHLORIDE 9 MG/ML
125 INJECTION, SOLUTION INTRAVENOUS CONTINUOUS
Status: DISCONTINUED | OUTPATIENT
Start: 2025-06-10 | End: 2025-06-10

## 2025-06-10 RX ADMIN — HEPARIN SODIUM 5000 UNITS: 5000 INJECTION INTRAVENOUS; SUBCUTANEOUS at 08:51

## 2025-06-10 RX ADMIN — MORPHINE SULFATE 4 MG: 4 INJECTION, SOLUTION INTRAMUSCULAR; INTRAVENOUS at 06:16

## 2025-06-10 RX ADMIN — CEFTRIAXONE 1000 MG: 1 INJECTION, POWDER, FOR SOLUTION INTRAMUSCULAR; INTRAVENOUS at 15:51

## 2025-06-10 RX ADMIN — GABAPENTIN 400 MG: 400 CAPSULE ORAL at 08:52

## 2025-06-10 RX ADMIN — SENNOSIDES, DOCUSATE SODIUM 2 TABLET: 50; 8.6 TABLET, FILM COATED ORAL at 21:13

## 2025-06-10 RX ADMIN — CYCLOBENZAPRINE 10 MG: 10 TABLET, FILM COATED ORAL at 21:14

## 2025-06-10 RX ADMIN — POLYETHYLENE GLYCOL (3350) 17 G: 17 POWDER, FOR SOLUTION ORAL at 08:53

## 2025-06-10 RX ADMIN — Medication 10 ML: at 08:53

## 2025-06-10 RX ADMIN — FAMOTIDINE 40 MG: 20 TABLET, FILM COATED ORAL at 08:55

## 2025-06-10 RX ADMIN — HEPARIN SODIUM 5000 UNITS: 5000 INJECTION INTRAVENOUS; SUBCUTANEOUS at 13:51

## 2025-06-10 RX ADMIN — KETOROLAC TROMETHAMINE 15 MG: 30 INJECTION, SOLUTION INTRAMUSCULAR; INTRAVENOUS at 08:51

## 2025-06-10 RX ADMIN — KETOROLAC TROMETHAMINE 15 MG: 30 INJECTION, SOLUTION INTRAMUSCULAR; INTRAVENOUS at 15:24

## 2025-06-10 RX ADMIN — KETOROLAC TROMETHAMINE 15 MG: 30 INJECTION, SOLUTION INTRAMUSCULAR; INTRAVENOUS at 21:14

## 2025-06-10 RX ADMIN — CEFAZOLIN 2000 MG: 2 INJECTION, POWDER, FOR SOLUTION INTRAMUSCULAR; INTRAVENOUS at 06:16

## 2025-06-10 RX ADMIN — LOSARTAN POTASSIUM 100 MG: 50 TABLET, FILM COATED ORAL at 08:52

## 2025-06-10 RX ADMIN — CYCLOBENZAPRINE 10 MG: 10 TABLET, FILM COATED ORAL at 08:52

## 2025-06-10 RX ADMIN — OXYCODONE AND ACETAMINOPHEN 2 TABLET: 7.5; 325 TABLET ORAL at 17:11

## 2025-06-10 RX ADMIN — PREDNISONE 4 MG: 1 TABLET ORAL at 08:53

## 2025-06-10 RX ADMIN — CYCLOBENZAPRINE 10 MG: 10 TABLET, FILM COATED ORAL at 00:47

## 2025-06-10 RX ADMIN — GABAPENTIN 400 MG: 400 CAPSULE ORAL at 21:14

## 2025-06-10 RX ADMIN — OXYCODONE AND ACETAMINOPHEN 2 TABLET: 7.5; 325 TABLET ORAL at 00:47

## 2025-06-10 RX ADMIN — SODIUM CHLORIDE 100 ML/HR: 9 INJECTION, SOLUTION INTRAVENOUS at 08:53

## 2025-06-10 RX ADMIN — SENNOSIDES, DOCUSATE SODIUM 2 TABLET: 50; 8.6 TABLET, FILM COATED ORAL at 08:51

## 2025-06-10 RX ADMIN — ROSUVASTATIN 20 MG: 20 TABLET, FILM COATED ORAL at 08:52

## 2025-06-10 RX ADMIN — Medication 10 ML: at 21:15

## 2025-06-10 RX ADMIN — DOXYCYCLINE 100 MG: 100 INJECTION, POWDER, LYOPHILIZED, FOR SOLUTION INTRAVENOUS at 15:50

## 2025-06-10 RX ADMIN — GABAPENTIN 400 MG: 400 CAPSULE ORAL at 15:25

## 2025-06-10 RX ADMIN — MORPHINE SULFATE 4 MG: 4 INJECTION, SOLUTION INTRAMUSCULAR; INTRAVENOUS at 10:11

## 2025-06-10 RX ADMIN — FAMOTIDINE 20 MG: 20 TABLET, FILM COATED ORAL at 06:17

## 2025-06-10 RX ADMIN — OXYCODONE AND ACETAMINOPHEN 2 TABLET: 7.5; 325 TABLET ORAL at 08:53

## 2025-06-10 RX ADMIN — METOPROLOL SUCCINATE 25 MG: 25 TABLET, EXTENDED RELEASE ORAL at 08:52

## 2025-06-10 RX ADMIN — HEPARIN SODIUM 5000 UNITS: 5000 INJECTION INTRAVENOUS; SUBCUTANEOUS at 21:14

## 2025-06-10 RX ADMIN — FOLIC ACID 1000 MCG: 1 TABLET ORAL at 08:51

## 2025-06-10 RX ADMIN — CYCLOBENZAPRINE 10 MG: 10 TABLET, FILM COATED ORAL at 15:25

## 2025-06-10 RX ADMIN — Medication 2000 UNITS: at 08:51

## 2025-06-10 RX ADMIN — VALACYCLOVIR HYDROCHLORIDE 500 MG: 500 TABLET, FILM COATED ORAL at 08:52

## 2025-06-10 NOTE — PROGRESS NOTES
Interval History:   Tiara is an 80-year-old female who is currently followed by orthopedics.  She is postoperative day #1 after undergoing a reverse right total shoulder arthroplasty.  Patient is resting quietly in bed at this time.  She endorses that to the right shoulder she is experiencing mild to moderate pain.  Patient reports that she has been able to somewhat participate with physical therapy today. Patient states that she did successfully have a  bowel movement.  According to nursing staff she does appear to be slightly confused since surgery. No acute events were noted overnight. Patient denies any acute chest pain or shortness of air.           Objective     Vital Signs  Temp:  [96.1 °F (35.6 °C)-98 °F (36.7 °C)] 96.1 °F (35.6 °C)  Heart Rate:  [] 94  Resp:  [14-22] 18  BP: (110-159)/(49-77) 110/49    Labs & Rads  Lab Results (last 72 hours)       Procedure Component Value Units Date/Time    Hepatitis Panel, Acute [[id number]]  (Normal) Collected: 06/10/25 0259    Specimen: Blood Updated: 06/10/25 0359     Hepatitis B Surface Ag Non-Reactive     Hep A IgM Non-Reactive     Hep B C IgM Non-Reactive     Hepatitis C Ab Non-Reactive    Narrative:      Results may be falsely decreased if patient taking Biotin.     Comprehensive Metabolic Panel [[id number]]  (Abnormal) Collected: 06/10/25 0259    Specimen: Blood Updated: 06/10/25 0354     Glucose 121 mg/dL      BUN 24.9 mg/dL      Creatinine 1.27 mg/dL      Sodium 137 mmol/L      Potassium 4.0 mmol/L      Chloride 107 mmol/L      CO2 18.3 mmol/L      Calcium 8.0 mg/dL      Total Protein 5.9 g/dL      Albumin 3.1 g/dL      ALT (SGPT) 18 U/L      AST (SGOT) 42 U/L      Alkaline Phosphatase 46 U/L      Total Bilirubin 0.4 mg/dL      Globulin 2.8 gm/dL      A/G Ratio 1.1 g/dL      BUN/Creatinine Ratio 19.6     Anion Gap 11.7 mmol/L      eGFR 42.8 mL/min/1.73     Narrative:      GFR Categories in Chronic Kidney Disease (CKD)              GFR Category           GFR (mL/min/1.73)    Interpretation  G1                    90 or greater        Normal or high (1)  G2                    60-89                Mild decrease (1)  G3a                   45-59                Mild to moderate decrease  G3b                   30-44                Moderate to severe decrease  G4                    15-29                Severe decrease  G5                    14 or less           Kidney failure    (1)In the absence of evidence of kidney disease, neither GFR category G1 or G2 fulfill the criteria for CKD.    eGFR calculation 2021 CKD-EPI creatinine equation, which does not include race as a factor    CBC & Differential [[id number]]  (Abnormal) Collected: 06/10/25 0259    Specimen: Blood Updated: 06/10/25 0337    Narrative:      The following orders were created for panel order CBC & Differential.  Procedure                               Abnormality         Status                     ---------                               -----------         ------                     CBC Auto Differential[[id number]]        Abnormal            Final result                 Please view results for these tests on the individual orders.    CBC Auto Differential [[id number]]  (Abnormal) Collected: 06/10/25 0259    Specimen: Blood Updated: 06/10/25 0337     WBC 20.69 10*3/mm3      RBC 3.09 10*6/mm3      Hemoglobin 10.0 g/dL      Hematocrit 31.9 %      .2 fL      MCH 32.4 pg      MCHC 31.3 g/dL      RDW 13.8 %      RDW-SD 51.9 fl      MPV 11.1 fL      Platelets 162 10*3/mm3      Neutrophil % 77.3 %      Lymphocyte % 9.8 %      Monocyte % 12.1 %      Eosinophil % 0.0 %      Basophil % 0.2 %      Immature Grans % 0.6 %      Neutrophils, Absolute 16.00 10*3/mm3      Lymphocytes, Absolute 2.02 10*3/mm3      Monocytes, Absolute 2.50 10*3/mm3      Eosinophils, Absolute 0.00 10*3/mm3      Basophils, Absolute 0.04 10*3/mm3      Immature Grans, Absolute 0.13 10*3/mm3      nRBC 0.0 /100 WBC     Magnesium [[id number]]   (Normal) Collected: 06/09/25 0722    Specimen: Blood Updated: 06/09/25 0923     Magnesium 2.4 mg/dL     Basic Metabolic Panel [[id number]]  (Abnormal) Collected: 06/09/25 0722    Specimen: Blood Updated: 06/09/25 0814     Glucose 100 mg/dL      BUN 16.7 mg/dL      Creatinine 0.78 mg/dL      Sodium 137 mmol/L      Potassium 4.1 mmol/L      Comment: Specimen slightly hemolyzed.  Result may be falsely elevated.        Chloride 105 mmol/L      CO2 18.9 mmol/L      Calcium 8.8 mg/dL      BUN/Creatinine Ratio 21.4     Anion Gap 13.1 mmol/L      eGFR 76.9 mL/min/1.73     Narrative:      GFR Categories in Chronic Kidney Disease (CKD)              GFR Category          GFR (mL/min/1.73)    Interpretation  G1                    90 or greater        Normal or high (1)  G2                    60-89                Mild decrease (1)  G3a                   45-59                Mild to moderate decrease  G3b                   30-44                Moderate to severe decrease  G4                    15-29                Severe decrease  G5                    14 or less           Kidney failure    (1)In the absence of evidence of kidney disease, neither GFR category G1 or G2 fulfill the criteria for CKD.    eGFR calculation 2021 CKD-EPI creatinine equation, which does not include race as a factor    CBC & Differential [[id number]]  (Abnormal) Collected: 06/09/25 0722    Specimen: Blood Updated: 06/09/25 0743    Narrative:      The following orders were created for panel order CBC & Differential.  Procedure                               Abnormality         Status                     ---------                               -----------         ------                     CBC Auto Differential[[id number]]        Abnormal            Final result                 Please view results for these tests on the individual orders.    CBC Auto Differential [[id number]]  (Abnormal) Collected: 06/09/25 0722    Specimen: Blood Updated: 06/09/25 0743     WBC  17.80 10*3/mm3      RBC 3.85 10*6/mm3      Hemoglobin 12.7 g/dL      Hematocrit 39.6 %      .9 fL      MCH 33.0 pg      MCHC 32.1 g/dL      RDW 14.1 %      RDW-SD 53.8 fl      MPV 11.2 fL      Platelets 183 10*3/mm3      Neutrophil % 69.6 %      Lymphocyte % 16.0 %      Monocyte % 13.1 %      Eosinophil % 0.3 %      Basophil % 0.3 %      Immature Grans % 0.7 %      Neutrophils, Absolute 12.37 10*3/mm3      Lymphocytes, Absolute 2.85 10*3/mm3      Monocytes, Absolute 2.33 10*3/mm3      Eosinophils, Absolute 0.06 10*3/mm3      Basophils, Absolute 0.06 10*3/mm3      Immature Grans, Absolute 0.13 10*3/mm3      nRBC 0.0 /100 WBC     Protime-INR [[id number]]  (Normal) Collected: 06/09/25 0539    Specimen: Blood Updated: 06/09/25 0649     Protime 14.2 Seconds      INR 1.04    Narrative:      Suggested INR therapeutic range for stable oral anticoagulant therapy:    Low Intensity therapy:   1.5-2.0  Moderate Intensity therapy:   2.0-3.0  High Intensity therapy:   2.5-4.0    Respiratory Panel PCR w/COVID-19(SARS-CoV-2) [facility] In-House, NP Swab in UTM/VTM, 2 HR TAT - Swab, Nasopharynx [[id number]]  (Abnormal) Collected: 06/07/25 1225    Specimen: Swab from Nasopharynx Updated: 06/07/25 1318     ADENOVIRUS, PCR Not Detected     Coronavirus 229E Not Detected     Coronavirus HKU1 Not Detected     Coronavirus NL63 Not Detected     Coronavirus OC43 Not Detected     COVID19 Not Detected     Human Metapneumovirus Not Detected     Human Rhinovirus/Enterovirus Detected     Influenza A PCR Not Detected     Influenza B PCR Not Detected     Parainfluenza Virus 1 Not Detected     Parainfluenza Virus 2 Not Detected     Parainfluenza Virus 3 Not Detected     Parainfluenza Virus 4 Not Detected     RSV, PCR Not Detected     Bordetella pertussis pcr Not Detected     Bordetella parapertussis PCR Not Detected     Chlamydophila pneumoniae PCR Not Detected     Mycoplasma pneumo by PCR Not Detected    Narrative:      In the  setting of a positive respiratory panel with a viral infection PLUS a negative procalcitonin without other underlying concern for bacterial infection, consider observing off antibiotics or discontinuation of antibiotics and continue supportive care. If the respiratory panel is positive for atypical bacterial infection (Bordetella pertussis, Chlamydophila pneumoniae, or Mycoplasma pneumoniae), consider antibiotic de-escalation to target atypical bacterial infection.          Imaging Results (Last 72 Hours)       Procedure Component Value Units Date/Time    XR Shoulder 2+ View Right [[id number]] Collected: 06/09/25 1636     Updated: 06/09/25 1639    Narrative:      EXAM:    XR Right Shoulder Complete, 2 or More Views     EXAM DATE:    6/9/2025 4:36 PM     CLINICAL HISTORY:    Post-Op 1 view AP only; S42.91XA-Fracture of right shoulder girdle,  part unspecified, initial encounter for closed fracture;  S92.901A-Unspecified fracture of right foot, initial encounter for  closed fracture; M25.571-Pain in right ankle and joints of right foot;  Z98.890-Other specified postprocedural states     TECHNIQUE:    Two or more views of the right shoulder.     COMPARISON:    No relevant prior studies available.     FINDINGS:    Bones/joints:  Right total shoulder arthroplasty.  Components appear  well seated.  No acute fracture.  No dislocation.    Soft tissues:  Unremarkable as visualized.       Impression:        No acute findings in the right shoulder.     This report was finalized on 6/9/2025 4:37 PM by Dr. Aime Haywood MD.       XR Shoulder 2+ View Right [[id number]] Collected: 06/08/25 1531     Updated: 06/08/25 1534    Narrative:      INDICATION: Pain and injury.     TECHNIQUE: 3 views of the right shoulder.     COMPARISON: No relevant priors.       Impression:      FINDINGS/IMPRESSION: Comminuted, displaced humeral head/neck fracture.  Anterior/inferior dislocation of the right shoulder. Soft tissue  swelling.     This report  was finalized on 6/8/2025 3:32 PM by Alex Pallas, DO.       XR Foot 3+ View Right [[id number]] Collected: 06/08/25 1530     Updated: 06/08/25 1533    Narrative:      INDICATION: Pain and injury.     TECHNIQUE: 3 views of the right foot. 3 views of the right ankle     COMPARISON: No relevant priors.     FINDINGS:   Evaluation of the right ankle and the right foot.     Mildly displaced fracture of the proximal aspect of the fifth  metatarsal. Mildly displaced distal fibular fracture. Remaining osseous  structures intact. No dislocation. No lytic or blastic bony lesions  seen. Mild diffuse joint space loss.     Soft tissue swelling.       Impression:      Mildly displaced fracture of the proximal aspect of the fifth  metatarsal. Mildly displaced distal fibular fracture.     This report was finalized on 6/8/2025 3:31 PM by Alex Pallas, DO.       XR Ankle 3+ View Right [[id number]] Collected: 06/08/25 1530     Updated: 06/08/25 1533    Narrative:      INDICATION: Pain and injury.     TECHNIQUE: 3 views of the right foot. 3 views of the right ankle     COMPARISON: No relevant priors.     FINDINGS:   Evaluation of the right ankle and the right foot.     Mildly displaced fracture of the proximal aspect of the fifth  metatarsal. Mildly displaced distal fibular fracture. Remaining osseous  structures intact. No dislocation. No lytic or blastic bony lesions  seen. Mild diffuse joint space loss.     Soft tissue swelling.       Impression:      Mildly displaced fracture of the proximal aspect of the fifth  metatarsal. Mildly displaced distal fibular fracture.     This report was finalized on 6/8/2025 3:31 PM by Alex Pallas, DO.       XR Chest 1 View [[id number]] Collected: 06/08/25 1334     Updated: 06/08/25 1340    Narrative:      PROCEDURE: Chest x-ray examination performed on June 7, 2025. Single  view.     HISTORY: Cough.     COMPARISON: None.     FINDINGS:     Mild enlarged heart size  Coarsened bronchovascular pattern to the  lungs.  Right humeral head and neck fracture with anterior dislocation of the  humeral head.  No free air in the upper abdomen.  Slight levoconvex curve at the mid thoracic spine.       Impression:         Mild enlarged heart size.  Hypoinflated lungs.  Right humeral head and neck fracture with anterior dislocation.  No pleural effusion. No pneumothorax.  Mild levoconvex curve at the mid thoracic spine.     This report was finalized on 6/8/2025 1:38 PM by Bobby Bright MD.                   Physical Exam:  Constitutional: Patient is alert to person and place but confused as to time.  No acute distress noted.    Musculoskeletal: Upon examination of the right shoulder there is a surgical incision noted anteriorly with wound edges well-approximated.  There is a surgical dressing that is clean, dry, and intact.  No evidence of erythema, active drainage, or signs of an infectious process.  Patient sensation appears to be grossly intact to light touch with brisk capillary refill.  Patient is able to flex and extend all digits of the right hand without complication.  There is a 2/4 palpable pulse radially.     Upon examination of the right foot, there is mild edema and ecchymosis noted. No evidence of erythema, wounds, drainage or signs of an infectious process. Tissues are soft. She is able to flex and extend the digits. Patient is able to plantar and dorsiflex at the ankle. Capillary refill is brisk and light touch sensation is intact distally. There is a positive pulse distally.          Assessment:    S/p reverse right total shoulder arthroplasty  Right 5th metatarsal fracture        Plan:  Continue with pain control.  Activity as tolerated with pain as the guide.    PT/OT: Patient is to remain in the sling to the right upper extremity.  Patient should refrain from any heavy lifting, pushing, pulling of the right upper extremity.    Dressing: Maintain the surgical dressing at this time.  Dressing may be reinforced for  saturation.    CAM boot to the right lower extremity and weight bear as tolerated.     DVT/PPx: Patient is currently on  heparin 5000 units subcutaneous 3 times daily      Orthopedics will continue to follow.        Discharge planning to be determined per hospitalist.        DIEGO Kent  06/10/25  12:51 EDT

## 2025-06-10 NOTE — PROGRESS NOTES
[facility] HOSPITALIST PROGRESS NOTE     Patient Identification:  Name:  Tiara Stubbs  Age:  80 y.o.  Sex:  female  :  1945  MRN:  [id number]  Visit Number:  [id number]  ROOM: [address] [place] [address]     Primary Care Provider:  Paula Downey APRN    Length of stay in inpatient status:  3    Subjective     Chief Compliant:  No chief complaint on file.      History of Presenting Illness:    Patient pleasantly confused. She notes she thinks she can go home but awaits PT/OT evaluation or any ambulation. Ortho was bedside examining right shoulder. Pain appears controlled. No family bedside.     ROS:  Otherwise 10 point ROS negative other than documented above in HPI.     Objective     Current Hospital Meds:cholecalciferol, 2,000 Units, Oral, Daily  cyclobenzaprine, 10 mg, Oral, TID  [START ON 2025] cyclobenzaprine, 10 mg, Oral, Nightly  famotidine, 40 mg, Oral, Daily  folic acid, 1,000 mcg, Oral, Daily  gabapentin, 400 mg, Oral, TID  heparin (porcine), 5,000 Units, Subcutaneous, Q8H  losartan, 100 mg, Oral, Q24H  metoprolol succinate XL, 25 mg, Oral, Daily  predniSONE, 4 mg, Oral, Daily  rosuvastatin, 20 mg, Oral, Daily  senna-docusate sodium, 2 tablet, Oral, BID  sodium chloride, 10 mL, Intravenous, Q12H  valACYclovir, 500 mg, Oral, Daily         Current Antimicrobial Therapy:  Anti-Infectives (From admission, onward)      Ordered     Dose/Rate Route Frequency Start Stop    25 1709  ceFAZolin 2000 mg IVPB in 100 mL NS (VTB)        Ordering Provider: Ameya Carlin MD    2,000 mg  over 30 Minutes Intravenous Every 8 Hours 25 2300 06/10/25 0646    25 1219  sodium chloride 3,000 mL with polymyxin B 1,500,000 Units, vancomycin 3,000 mg irrigation        Ordering Provider: Ameya Carlin MD     Irrigation Once 25 1230 25 1332    25 1050  ceFAZolin 2000 mg IVPB in 100 mL NS (VTB)        Ordering Provider: Ameya Carlin MD    2,000 mg  over 30 Minutes  Intravenous Once 06/09/25 1052 06/09/25 1524    06/07/25 2021  valACYclovir (VALTREX) tablet 500 mg        Ordering Provider: Ameya Carlin MD    500 mg Oral Daily 06/08/25 0900 06/08/26 0859          Current Diuretic Therapy:  Diuretics (From admission, onward)      None          ----------------------------------------------------------------------------------------------------------------------  Vital Signs:  Temp:  [96.1 °F (35.6 °C)-98 °F (36.7 °C)] 96.1 °F (35.6 °C)  Heart Rate:  [] 94  Resp:  [14-22] 18  BP: (110-159)/(49-77) 110/49  SpO2:  [88 %-96 %] 95 %  on  Flow (L/min) (Oxygen Therapy):  [2-4] 2;   Device (Oxygen Therapy): nasal cannula  Body mass index is 30.17 kg/m².    Wt Readings from Last 3 Encounters:   06/10/25 87.4 kg (192 lb 9.6 oz)   12/05/24 89 kg (196 lb 3.2 oz)   05/23/24 88.8 kg (195 lb 12.8 oz)     Intake & Output (last 3 days)         06/07 0701  06/08 0700 06/08 0701  06/09 0700 06/09 0701  06/10 0700 06/10 0701 06/11 0700    P.O.  600 600 360    I.V. (mL/kg)   1000 (11.4)     IV Piggyback   200     Total Intake(mL/kg)  600 (6.9) 1800 (20.6) 360 (4.1)    Urine (mL/kg/hr) 1350 700 (0.3) 1200 (0.6)     Stool 0       Total Output 1347 064 2664     Net -1350 -100 +600 +360                  Diet: Regular/House; Fluid Consistency: Thin (IDDSI 0)  ----------------------------------------------------------------------------------------------------------------------  Physical exam:  Constitutional:  Well-developed and well-nourished.  No respiratory distress.      HENT:  Head:  Normocephalic and atraumatic.  Mouth:  Moist mucous membranes.    Eyes:  Conjunctivae and EOM are normal. No scleral icterus.    Neck:  Neck supple.  No JVD present.    Cardiovascular:  Normal rate, regular rhythm and normal heart sounds with no murmur.  Pulmonary/Chest:  No respiratory distress, no wheezes, no crackles, with normal breath sounds and good air movement.  Abdominal:  Soft.  Bowel sounds are  "normal.  No distension and no tenderness.   Musculoskeletal:  R ankle not grossly abnormal. R shoulder with bandaging in place.   Neurological:  Alert and oriented to person, place, and time.  No cranial nerve deficit.  No tongue deviation.  No facial droop.  No slurred speech.   Skin:  Skin is warm and dry. No rash noted. No pallor.   Peripheral vascular:  Pulses in all 4 extremities with no clubbing, no cyanosis, no edema.  ----------------------------------------------------------------------------------------------------------------------  Tele:    ----------------------------------------------------------------------------------------------------------------------  Results from last 7 days   Lab Units 06/10/25  0259 06/09/25  0722 06/09/25  0539 06/07/25  1120   WBC 10*3/mm3 20.69* 17.80*  --  11.17*   HEMOGLOBIN g/dL 10.0* 12.7  --  12.3   HEMATOCRIT % 31.9* 39.6  --  37.8   MCV fL 103.2* 102.9*  --  101.3*   MCHC g/dL 31.3* 32.1  --  32.5   PLATELETS 10*3/mm3 162 183  --  158   INR   --   --  1.04  --          Results from last 7 days   Lab Units 06/10/25  0259 06/09/25  0722 06/07/25  1120   SODIUM mmol/L 137 137 143   POTASSIUM mmol/L 4.0 4.1 3.9   MAGNESIUM mg/dL  --  2.4  --    CHLORIDE mmol/L 107 105 108*   CO2 mmol/L 18.3* 18.9* 22.9   BUN mg/dL 24.9* 16.7 19.5   CREATININE mg/dL 1.27* 0.78 0.83   CALCIUM mg/dL 8.0* 8.8 8.7   GLUCOSE mg/dL 121* 100* 103*   ALBUMIN g/dL 3.1*  --  3.8   BILIRUBIN mg/dL 0.4  --  0.6   ALK PHOS U/L 46  --  60   AST (SGOT) U/L 42*  --  87*   ALT (SGPT) U/L 18  --  49*   Estimated Creatinine Clearance: 40.1 mL/min (A) (by C-G formula based on SCr of 1.27 mg/dL (H)).  No results found for: \"AMMONIA\"              No results found for: \"HGBA1C\", \"POCGLU\"  No results found for: \"TSH\", \"FREET4\"  No results found for: \"PREGTESTUR\", \"PREGSERUM\", \"HCG\", \"HCGQUANT\"  Pain Management Panel           No data to display              Brief Urine Lab Results       None          No results " "found for: \"BLOODCX\"  No results found for: \"URINECX\"  No results found for: \"WOUNDCX\"  No results found for: \"STOOLCX\"  No results found for: \"RESPCX\"  No results found for: \"AFBCX\"        I have personally looked at the labs and they are summarized above.  ----------------------------------------------------------------------------------------------------------------------  Detailed radiology reports for the last 24 hours:    Imaging Results (Last 24 Hours)       Procedure Component Value Units Date/Time    XR Shoulder 2+ View Right [[id number]] Collected: 06/09/25 1636     Updated: 06/09/25 1639    Narrative:      EXAM:    XR Right Shoulder Complete, 2 or More Views     EXAM DATE:    6/9/2025 4:36 PM     CLINICAL HISTORY:    Post-Op 1 view AP only; S42.91XA-Fracture of right shoulder girdle,  part unspecified, initial encounter for closed fracture;  S92.901A-Unspecified fracture of right foot, initial encounter for  closed fracture; M25.571-Pain in right ankle and joints of right foot;  Z98.890-Other specified postprocedural states     TECHNIQUE:    Two or more views of the right shoulder.     COMPARISON:    No relevant prior studies available.     FINDINGS:    Bones/joints:  Right total shoulder arthroplasty.  Components appear  well seated.  No acute fracture.  No dislocation.    Soft tissues:  Unremarkable as visualized.       Impression:        No acute findings in the right shoulder.     This report was finalized on 6/9/2025 4:37 PM by Dr. Aime Haywood MD.             Assessment & Plan    #Right humerus fracture and right shoulder dislocation secondary to mechanical ground level fall  #Right fifth metatarsal fracture  #Hyperlipidemia - continue rosuvastatin  #RA  #Osteoarthritis  #Lupus  #Hypertension - BP well controlled. Continue home metoprolol and losartan.  #GERD - continue famotidine  #VERONIQUE  #Leukocytosis   #Human rhinovirus   #Mild hepatocellular transaminitis - Normalized  #MAURO - Suspect prerenal in " post operative period, repeat in AM after 500 cc IVF over 10 hours and encourage PO intake.      POD#1  total reverse shoulder arthroplasty. Surgery plans nonoperatively management of metatarsal fracture with boot and WBAT. Repeat labs in AM. PRN zofran. PRN pain control. PT/OT to evaluate today. Suspect Leukocytosis reactive, patient has been afebrile. Will get chest x-ray, UA given AMS.      Code status: Full      Pending PT/OT postoperatively     Simone Coelho MD  [facility]  06/10/25  14:03 EDT

## 2025-06-10 NOTE — THERAPY EVALUATION
Patient Name: Tiara Stubbs  : 1945    MRN: [id number]                              Today's Date: 6/10/2025       Admit Date: 2025    Visit Dx:     ICD-10-CM ICD-9-CM   1. Shoulder fracture, right, closed, initial encounter  S42.91XA 812.00   2. Closed fracture of right foot, initial encounter  S92.901A 825.20   3. Acute right ankle pain  M25.571 719.47     338.19   4. Post-operative state  Z98.890 V45.89     Patient Active Problem List   Diagnosis    Overweight (BMI 25.0-29.9)    Immunization due    Chronic cough    SOB (shortness of breath)    Shoulder fracture, right, closed, initial encounter    Closed fracture of bone of right foot    Acute right ankle pain     Past Medical History:   Diagnosis Date    Acid reflux     Cancer     skin    Fibromyalgia     High cholesterol     Hx: recurrent pneumonia     Hypertension     Lupus     Osteoarthritis     Rheumatoid arthritis     Shoulder injury      Past Surgical History:   Procedure Laterality Date    CARPAL TUNNEL RELEASE Bilateral     KNEE SURGERY Right     TOTAL WRIST ARTHROPLASTY Right       General Information       Row Name 06/10/25 1438          OT Time and Intention    Subjective Information complains of;pain  -     Document Type evaluation  -     Mode of Treatment individual therapy;occupational therapy  -     Patient Effort adequate  -KP     Symptoms Noted During/After Treatment increased pain;other (see comments)  confusion  -     Comment Patient agreeable to therapy evaluation. Nursing aware.  -       Row Name 06/10/25 1438          General Information    Patient Profile Reviewed yes  -KP     Prior Level of Function independent:;ADL's;all household mobility  daughter assist with IADLs  -     Existing Precautions/Restrictions fall  NWB RUE, WBAT RLE with boot  -KP     Barriers to Rehab medically complex;cognitive status  -       Row Name 06/10/25 1438          Occupational Profile    Reason for Services/Referral (Occupational  Profile) Patient was admitted to [facility] on 6/7/2025. She underwent reverse right shoulder arthroplasty on 6/9/2025. She was referred for OT evaluation due to change in functional performance with ADLs, functional mobility, and/or transfers.  -       Row Name 06/10/25 1438          Living Environment    Current Living Arrangements home  -     People in Home child(vandana), adult  -       Row Name 06/10/25 1438          Cognition    Orientation Status (Cognition) oriented to;person;verbal cues/prompts needed for orientation  -       Row Name 06/10/25 1438          Safety Issues/Impairments Affecting Functional Mobility    Safety Issues Affecting Function (Mobility) insight into deficits/self-awareness;awareness of need for assistance;judgment;problem-solving  -     Impairments Affecting Function (Mobility) balance;cognition;endurance/activity tolerance;pain;range of motion (ROM);strength  -     Cognitive Impairments, Mobility Safety/Performance awareness, need for assistance;insight into deficits/self-awareness;judgment;problem-solving/reasoning  -               User Key  (r) = Recorded By, (t) = Taken By, (c) = Cosigned By      Initials Name Provider Type     Katy Malik, OT Occupational Therapist                     Mobility/ADL's       Row Name 06/10/25 1444          Bed Mobility    Bed Mobility supine-sit  -     Supine-Sit [place] (Bed Mobility) moderate assist (50% patient effort);maximum assist (25% patient effort);2 person assist  -     Assistive Device (Bed Mobility) head of bed elevated  -       Row Name 06/10/25 1444          Transfers    Transfers sit-stand transfer;stand-sit transfer;bed-chair transfer  -       Row Name 06/10/25 1444          Bed-Chair Transfer    Bed-Chair [place] (Transfers) minimum assist (75% patient effort);2 person assist  -       Row Name 06/10/25 1444          Sit-Stand Transfer    Sit-Stand [place] (Transfers) minimum assist  (75% patient effort);2 person assist  -KP       Row Name 06/10/25 1444          Stand-Sit Transfer    Stand-Sit [place] (Transfers) minimum assist (75% patient effort);2 person assist  -KP       Row Name 06/10/25 1444          Activities of Daily Living    BADL Assessment/Intervention bathing;upper body dressing;lower body dressing;grooming;feeding;toileting  -KP       Row Name 06/10/25 1444          Bathing Assessment/Intervention    [place] Level (Bathing) bathing skills;dependent (less than 25% patient effort)  -KP       Row Name 06/10/25 1444          Upper Body Dressing Assessment/Training    [place] Level (Upper Body Dressing) upper body dressing skills;dependent (less than 25% patient effort)  -KP       Row Name 06/10/25 1444          Lower Body Dressing Assessment/Training    [place] Level (Lower Body Dressing) lower body dressing skills;dependent (less than 25% patient effort)  -KP       Row Name 06/10/25 1444          Grooming Assessment/Training    [place] Level (Grooming) grooming skills;maximum assist (25% patient effort)  -KP       Row Name 06/10/25 1444          Self-Feeding Assessment/Training    [place] Level (Feeding) feeding skills;minimum assist (75% patient effort)  -KP       Row Name 06/10/25 1444          Toileting Assessment/Training    [place] Level (Toileting) toileting skills;dependent (less than 25% patient effort)  -               User Key  (r) = Recorded By, (t) = Taken By, (c) = Cosigned By      Initials Name Provider Type     Katy Malik OT Occupational Therapist                   Obj/Interventions       [facility] Name 06/10/25 1445          Sensory Assessment (Somatosensory)    Sensory Assessment (Somatosensory) UE sensation intact  -KP       Row Name 06/10/25 1445          Vision Assessment/Intervention    Visual Impairment/Limitations WFL  -KP       Row Name 06/10/25 1445          Range of Motion Comprehensive    Comment, General Range of Motion  RUE not tested sling in place, LUE WFLs  -[facility] Name 06/10/25 1445          Strength Comprehensive (MMT)    Comment, General Manual Muscle Testing (MMT) Assessment LUE 4/5, RUE not tested  -[facility] Name 06/10/25 1445          Motor Skills    Motor Skills coordination;functional endurance  -     Coordination WFL;left;upper extremity  -KP     Functional Endurance fair minus  -KP       [facility] Name 06/10/25 1445          Balance    Balance Assessment sitting static balance;standing static balance  -KP     Static Sitting Balance minimal assist  -KP     Static Standing Balance minimal assist  -               User Key  (r) = Recorded By, (t) = Taken By, (c) = Cosigned By      Initials Name Provider Type     Katy Malik, OT Occupational Therapist                   Goals/Plan       [facility] Name 06/10/25 144          Transfer Goal 1 (OT)    Activity/Assistive Device (Transfer Goal 1, OT) toilet;commode, 3-in-1  -KP     [place] Level/Cues Needed (Transfer Goal 1, OT) standby assist  -     Time Frame (Transfer Goal 1, OT) by discharge  -[facility] Name 06/10/25 1446          Dressing Goal 1 (OT)    Activity/Device (Dressing Goal 1, OT) upper body dressing  -     [place]/Cues Needed (Dressing Goal 1, OT) moderate assist (50-74% patient effort)  -     Time Frame (Dressing Goal 1, OT) by discharge  -[facility] Name 06/10/25 144          Problem Specific Goal 1 (OT)    Problem Specific Goal 1 (OT) Patient will perform sustained activity X12 minutes to promote functional endurance/activity tolerance needed for daily routine.  -     Time Frame (Problem Specific Goal 1, OT) by discharge  -[facility] Name 06/10/25 144          Therapy Assessment/Plan (OT)    Planned Therapy Interventions (OT) activity tolerance training;BADL retraining;functional balance retraining;patient/caregiver education/training;passive ROM/stretching;occupation/activity based interventions;transfer/mobility  retraining;strengthening exercise;ROM/therapeutic exercise  -               User Key  (r) = Recorded By, (t) = Taken By, (c) = Cosigned By      Initials Name Provider Type     Katy Malik, OT Occupational Therapist                   Clinical Impression       Row Name 06/10/25 1446          Pain Assessment    Pain Location shoulder  -     Pain Side/Orientation right  -     Additional Documentation Pain Scale: FACES Pre/Post-Treatment (Group)  -       Row Name 06/10/25 1446          Pain Scale: FACES Pre/Post-Treatment    Pain: FACES Scale, Pretreatment 2-->hurts little bit  -     Posttreatment Pain Rating 2-->hurts little bit  -       Row Name 06/10/25 1446          Plan of Care Review    Plan of Care Reviewed With patient  -     Progress no change  -     Outcome Evaluation Patient seen for OT evaluation. She presents with functional limitations including generalized weakness, impaired balance, limited ROM, pain, and decreased functional endurance/activity tolerance. Safety impacted due current cognition as well. Patient would benefit from ongoing OT services to promote highest level of independence and safety prior to discharge.  -       Row Name 06/10/25 1446          Therapy Assessment/Plan (OT)    Patient/Family Therapy Goal Statement (OT) be able to return home with daughter  -     Rehab Potential (OT) good  -     Criteria for Skilled Therapeutic Interventions Met (OT) yes;meets criteria;skilled treatment is necessary  -     Therapy Frequency (OT) 5 times/wk  -     Predicted Duration of Therapy Intervention (OT) discharge  -       Row Name 06/10/25 1446          Therapy Plan Review/Discharge Plan (OT)    Anticipated Discharge Disposition (OT) inpatient rehabilitation facility;skilled nursing facility  -[facility] Name 06/10/25 1446          Positioning and Restraints    Pre-Treatment Position in bed  -     Post Treatment Position chair  -     In Chair notified  nsg;sitting;call light within reach;encouraged to call for assist;exit alarm on  -KP               User Key  (r) = Recorded By, (t) = Taken By, (c) = Cosigned By      Initials Name Provider Type    Katy Sahu OT Occupational Therapist                   Outcome Measures       Row Name 06/10/25 0851          How much help from another person do you currently need...    Turning from your back to your side while in flat bed without using bedrails? 2  -LB     Moving from lying on back to sitting on the side of a flat bed without bedrails? 2  -LB     Moving to and from a bed to a chair (including a wheelchair)? 2  -LB     Standing up from a chair using your arms (e.g., wheelchair, bedside chair)? 2  -LB     Climbing 3-5 steps with a railing? 2  -LB     To walk in hospital room? 2  -LB     AM-PAC 6 Clicks Score (PT) 12  -LB               User Key  (r) = Recorded By, (t) = Taken By, (c) = Cosigned By      Initials Name Provider Type    Indu Dozier, RN Registered Nurse                      OT Recommendation and Plan  Planned Therapy Interventions (OT): activity tolerance training, BADL retraining, functional balance retraining, patient/caregiver education/training, passive ROM/stretching, occupation/activity based interventions, transfer/mobility retraining, strengthening exercise, ROM/therapeutic exercise  Therapy Frequency (OT): 5 times/wk  Plan of Care Review  Plan of Care Reviewed With: patient  Progress: no change  Outcome Evaluation: Patient seen for OT evaluation. She presents with functional limitations including generalized weakness, impaired balance, limited ROM, pain, and decreased functional endurance/activity tolerance. Safety impacted due current cognition as well. Patient would benefit from ongoing OT services to promote highest level of independence and safety prior to discharge.     Time Calculation:         Time Calculation- OT       Row Name 06/10/25 1449             Time Calculation- OT    OT  Received On 06/10/25  -LEE                User Key  (r) = Recorded By, (t) = Taken By, (c) = Cosigned By      Initials Name Provider Type    Katy Sahu OT Occupational Therapist                  Therapy Charges for Today       Code Description Service Date Service Provider Modifiers Qty    [id number] HC OT EVAL MOD COMPLEXITY 4 6/10/2025 Katy Malik OT GO 1                 Katy Malik OT  6/10/2025

## 2025-06-10 NOTE — NURSING NOTE
Pt is being transferred to room 337. Called report and spoke to ED. Report has been accepted. Pt is ready for transport at this time.

## 2025-06-10 NOTE — CASE MANAGEMENT/SOCIAL WORK
Discharge Planning Assessment  [facility]     Patient Name: Tiara Stubbs  MRN: [id number]  Today's Date: 6/10/2025    Admit Date: 6/7/2025    Plan: SS received consult per nsg for discharge planning.  SS spoke with pt at bedside.  Pt resides at home with Daughter Keya Quinonez and plans to return home at discharge.  Pt currently does not utilize home health services.  Pt has rolling walker and straight cane.  Pt stated she had a fall at home and was doing well prior to fall.  Pt would be agreeable to rehab prior to home if needed.  Pt's PCP is Paula Downey.  Pt utilizes [facility].  SS will follow.   Discharge Needs Assessment       Row Name 06/10/25 1107       Living Environment    People in Home child(vandana), adult    Name(s) of People in Home Lives at home with daughter, Keya Lund    Current Living Arrangements home    Potentially Unsafe Housing Conditions none    In the past 12 months has the electric, gas, oil, or water company threatened to shut off services in your home? No    Primary Care Provided by self    Provides Primary Care For no one    Family Caregiver if Needed child(vandana), adult    Family Caregiver Names Daughter Keya Lund    Quality of Family Relationships helpful;involved;supportive    Able to Return to Prior Arrangements yes       Resource/Environmental Concerns    Resource/Environmental Concerns none    Transportation Concerns none       Transportation Needs    In the past 12 months, has lack of transportation kept you from medical appointments or from getting medications? no    In the past 12 months, has lack of transportation kept you from meetings, work, or from getting things needed for daily living? No       Food Insecurity    Within the past 12 months, you worried that your food would run out before you got the money to buy more. Never true    Within the past 12 months, the food you bought just didn't last and you didn't have money to get more. Never  true       Transition Planning    Patient/Family Anticipates Transition to home with family    Patient/Family Anticipated Services at Transition     Transportation Anticipated family or friend will provide       Discharge Needs Assessment    Equipment Currently Used at Home walker, rolling;cane, straight    Concerns to be Addressed discharge planning    Do you want help finding or keeping work or a job? I do not need or want help    Do you want help with school or training? For example, starting or completing job training or getting a high school diploma, GED or equivalent No    Anticipated Changes Related to Illness none                   Discharge Plan       Row Name 06/10/25 1102       Plan    Plan SS received consult per nsg for discharge planning.  SS spoke with pt at bedside.  Pt resides at home with Daughter Keya Quinonez and plans to return home at discharge.  Pt currently does not utilize home health services.  Pt has rolling walker and straight cane.  Pt stated she had a fall at home and was doing well prior to fall.  Pt would be agreeable to rehab prior to home if needed.  Pt's PCP is Paula Downey.  Pt utilizes [facility].  SS will follow.                    Expected Discharge Date and Time       Expected Discharge Date Expected Discharge Time    [place] 10, 2025            Demographic Summary       Row Name 06/10/25 110       General Information    Admission Type inpatient    Arrived From home    Referral Source nursing    Reason for Consult discharge planning    Preferred Language English                  Sherry Gonzalez, CHRISSIEW

## 2025-06-10 NOTE — PLAN OF CARE
Goal Outcome Evaluation:   Pt resting in bed this shift with complaints of pain throughout shift, see MAR. Pt has been intermittently confused throughout shift, pt is able to be reoriented, LENIN SANFORD aware. No further complaints voiced at this time. No visible acute s/s of distress noted at this time. Plan of care ongoing.

## 2025-06-10 NOTE — THERAPY EVALUATION
Acute Care - Physical Therapy Initial Evaluation   [facility]     Patient Name: Tiara Stubbs  : 1945  MRN: [id number]  Today's Date: 6/10/2025      Visit Dx:     ICD-10-CM ICD-9-CM   1. Shoulder fracture, right, closed, initial encounter  S42.91XA 812.00   2. Closed fracture of right foot, initial encounter  S92.901A 825.20   3. Acute right ankle pain  M25.571 719.47     338.19   4. Post-operative state  Z98.890 V45.89     Patient Active Problem List   Diagnosis    Overweight (BMI 25.0-29.9)    Immunization due    Chronic cough    SOB (shortness of breath)    Shoulder fracture, right, closed, initial encounter    Closed fracture of bone of right foot    Acute right ankle pain     Past Medical History:   Diagnosis Date    Acid reflux     Cancer     skin    Fibromyalgia     High cholesterol     Hx: recurrent pneumonia     Hypertension     Lupus     Osteoarthritis     Rheumatoid arthritis     Shoulder injury      Past Surgical History:   Procedure Laterality Date    CARPAL TUNNEL RELEASE Bilateral     KNEE SURGERY Right     TOTAL WRIST ARTHROPLASTY Right      PT Assessment (Last 12 Hours)       PT Evaluation and Treatment       Row Name 06/10/25 1449          Physical Therapy Time and Intention    Subjective Information complains of;pain  -KM     Document Type evaluation  -KM     Mode of Treatment physical therapy  -KM     Patient Effort adequate  -KM     Symptoms Noted During/After Treatment fatigue;increased pain  -KM       Row Name 06/10/25 1449          General Information    Patient Profile Reviewed yes  -KM     Patient Observations alert;cooperative;agree to therapy  -KM     Prior Level of Function independent:;all household mobility;ADL's  -KM     Existing Precautions/Restrictions fall  RUE NWB, RLE WBAT w/ boot  -KM     Risks Reviewed patient:;LOB;nausea/vomiting;dizziness;increased discomfort  -KM     Benefits Reviewed patient:;improve function;increase independence;increase strength;increase balance   -KM     Barriers to Rehab medically complex;cognitive status  -       Row Name 06/10/25 1449          Living Environment    Current Living Arrangements home  -KM     People in Home child(vandana), adult  -KM     Primary Care Provided by self  -KM       Row Name 06/10/25 1449          Home Use of Assistive/Adaptive Equipment    Equipment Currently Used at Home walker, rolling;cane, straight  -KM       Row Name 06/10/25 1449          Pain Scale: FACES Pre/Post-Treatment    Pain: FACES Scale, Pretreatment 2-->hurts little bit  -KM     Posttreatment Pain Rating 2-->hurts little bit  -KM       Row Name 06/10/25 1449          Cognition    Affect/Mental Status (Cognition) confused  -KM     Orientation Status (Cognition) oriented to;person;verbal cues/prompts needed for orientation  -KM     Follows Commands (Cognition) follows one-step commands  -       Row Name 06/10/25 1449          Range of Motion (ROM)    Range of Motion bilateral lower extremities;ROM is WFL  -[facility] Name 06/10/25 1449          Strength (Manual Muscle Testing)    Strength (Manual Muscle Testing) --  BLE strength >3/5  -KM       [facility] Name 06/10/25 1449          Bed Mobility    Bed Mobility supine-sit  -KM     Supine-Sit [place] (Bed Mobility) moderate assist (50% patient effort);maximum assist (25% patient effort);2 person assist  -KM     Assistive Device (Bed Mobility) head of bed elevated  -       Row Name 06/10/25 1449          Transfers    Transfers sit-stand transfer;stand-sit transfer;bed-chair transfer  -[facility] Name 06/10/25 1449          Bed-Chair Transfer    Bed-Chair [place] (Transfers) minimum assist (75% patient effort);2 person assist  -KM     Comment, (Bed-Chair Transfer) HHA  -[facility] Name 06/10/25 1449          Sit-Stand Transfer    Sit-Stand [place] (Transfers) minimum assist (75% patient effort);2 person assist  -KM     Comment, (Sit-Stand Transfer) A  -[facility] Name 06/10/25 1449           Stand-Sit Transfer    Stand-Sit [place] (Transfers) minimum assist (75% patient effort);2 person assist  -KM     Comment, (Stand-Sit Transfer) HHA  -KM       Row Name 06/10/25 1449          Gait/Stairs (Locomotion)    Patient was able to Ambulate no, other medical factors prevent ambulation  -KM     Reason Patient was unable to Ambulate Excessive Weakness  unable to ambulate, but able to stand-pivot transfer  -KM       Row Name 06/10/25 1449          Safety Issues/Impairments Affecting Functional Mobility    Safety Issues Affecting Function (Mobility) awareness of need for assistance;insight into deficits/self-awareness;judgment;problem-solving  -KM     Impairments Affecting Function (Mobility) balance;cognition;endurance/activity tolerance;pain;range of motion (ROM);strength  -KM     Cognitive Impairments, Mobility Safety/Performance awareness, need for assistance;insight into deficits/self-awareness;judgment;problem-solving/reasoning  -KM       Row Name 06/10/25 1449          Balance    Balance Assessment sitting static balance;standing static balance  -KM     Static Sitting Balance minimal assist  -KM     Position, Sitting Balance sitting edge of bed;sitting in chair  -KM     Static Standing Balance minimal assist  -KM       Row Name             Wound 06/09/25 1511 Right shoulder Surgical Open Surgical Incision    Wound - Properties Group Placement Date: 06/09/25  -CE Placement Time: 1511  -CE Side: Right  -CE Location: shoulder  -CE Primary Wound Type: Surgical  -CE Secondary Wound Type - Surgical: Open Surg  -CE    Retired Wound - Properties Group Placement Date: 06/09/25  -CE Placement Time: 1511  -CE Side: Right  -CE Location: shoulder  -CE    Retired Wound - Properties Group Placement Date: 06/09/25  -CE Placement Time: 1511 -CE Side: Right  -CE Location: shoulder  -CE    Retired Wound - Properties Group Date first assessed: 06/09/25  -CE Time first assessed: 1511 -CE Side: Right  -CE Location:  shoulder  -CE      [facility] Name 06/10/25 1449          Plan of Care Review    Plan of Care Reviewed With patient  -     Outcome Evaluation Pt. evaluation completed during PT session. She was able to perform functional mobility skills w/ [place]achel[place]-modA x2. She was able to transfer but unable to ambulate at time of evaluation. Pt. would benefit from increased PT services at this time.  -[facility] Name 06/10/25 1449          Therapy Assessment/Plan (PT)    Patient/Family Therapy Goals Statement (PT) return to PLOF  -KM     Functional Level at Time of Evaluation (PT) modA  -KM     PT Diagnosis (PT) decreased mobility skills  -KM     Rehab Potential (PT) fair  -KM     Criteria for Skilled Interventions Met (PT) yes;skilled treatment is necessary  -KM     Therapy Frequency (PT) 5 times/wk  5x/wk  -KM     Predicted Duration of Therapy Intervention (PT) until discharge  -     Problem List (PT) problems related to;balance;mobility;range of motion (ROM);strength;pain  -KM     Activity Limitations Related to Problem List (PT) unable to ambulate safely;unable to transfer safely  -[facility] Name 06/10/25 1449          Therapy Plan Review/Discharge Plan (PT)    Therapy Plan Review (PT) evaluation/treatment results reviewed;care plan/treatment goals reviewed;risks/benefits reviewed;patient  -KM       Row Name 06/10/25 1449          Physical Therapy Goals    Bed Mobility Goal Selection (PT) bed mobility, PT goal 1  -KM     Transfer Goal Selection (PT) transfer, PT goal 1  -KM     Gait Training Goal Selection (PT) gait training, PT goal 1  -KM       [facility] Name 06/10/25 1449          Bed Mobility Goal 1 (PT)    Activity/Assistive Device (Bed Mobility Goal 1, PT) bed mobility activities, all  -KM     [place] Level/Cues Needed (Bed Mobility Goal 1, PT) minimum assist (75% or more patient effort)  -KM     Time Frame (Bed Mobility Goal 1, PT) by discharge  -       Row Name 06/10/25 1442          Transfer Goal 1 (PT)     Activity/Assistive Device (Transfer Goal 1, PT) transfers, all;walker, rolling  -KM     [place] Level/Cues Needed (Transfer Goal 1, PT) standby assist  -KM     Time Frame (Transfer Goal 1, PT) by discharge  -       Row Name 06/10/25 1449          Gait Training Goal 1 (PT)    Activity/Assistive Device (Gait Training Goal 1, PT) gait (walking locomotion);assistive device use;walker, rolling  -KM     [place] Level (Gait Training Goal 1, PT) standby assist  -KM     Distance (Gait Training Goal 1, PT) 30'  -KM     Time Frame (Gait Training Goal 1, PT) by discharge  -               User Key  (r) = Recorded By, (t) = Taken By, (c) = Cosigned By      Initials Name Provider Type    Julián Cunningham, RN Registered Nurse    Talat Vaughn, PT Physical Therapist                    Physical Therapy Education       Title: PT OT SLP Therapies (Done)       Topic: Physical Therapy (Done)       Point: Mobility training (Done)       Learning Progress Summary            Patient Acceptance, E,TB, VU by  at 6/10/2025 1455                      Point: Home exercise program (Done)       Learning Progress Summary            Patient Acceptance, E,TB, VU by  at 6/10/2025 1455                      Point: Body mechanics (Done)       Learning Progress Summary            Patient Acceptance, E,TB, VU by  at 6/10/2025 1455                      Point: Precautions (Done)       Learning Progress Summary            Patient Acceptance, E,TB, VU by  at 6/10/2025 1455                                      User Key       Initials Effective Dates Name Provider Type Discipline     05/24/22 -  Talat Trujillo, PT Physical Therapist PT                  PT Recommendation and Plan  Anticipated Discharge Disposition (PT): inpatient rehabilitation facility, skilled nursing facility (further rehab required prior to returning home)  Planned Therapy Interventions (PT): balance training, bed mobility training, gait training, home exercise  program, patient/family education, postural re-education, ROM (range of motion), strengthening, stretching, transfer training  Therapy Frequency (PT): 5 times/wk (5x/wk)  Plan of Care Reviewed With: patient  Outcome Evaluation: Pt. evaluation completed during PT session. She was able to perform functional mobility skills w/ Lachelle-modA x2. She was able to transfer but unable to ambulate at time of evaluation. Pt. would benefit from increased PT services at this time.       Time Calculation:    PT Charges       Row Name 06/10/25 1448             Time Calculation    PT Received On 06/10/25  -KM      PT Goal Re-Cert Due Date 06/24/25  -KM                User Key  (r) = Recorded By, (t) = Taken By, (c) = Cosigned By      Initials Name Provider Type    Talat Vaughn, PT Physical Therapist                  Therapy Charges for Today       Code Description Service Date Service Provider Modifiers Qty    15417600811 HC PT EVAL MOD COMPLEXITY 4 6/10/2025 Talat Trujillo, PT GP 1            PT G-Codes  AM-PAC 6 Clicks Score (PT): 12    Talat Trujillo PT  6/10/2025

## 2025-06-11 LAB
ALBUMIN SERPL-MCNC: 2.9 G/DL (ref 3.5–5.2)
ALBUMIN/GLOB SERPL: 1 G/DL
ALP SERPL-CCNC: 54 U/L (ref 39–117)
ALT SERPL W P-5'-P-CCNC: 14 U/L (ref 1–33)
ANION GAP SERPL CALCULATED.3IONS-SCNC: 9.5 MMOL/L (ref 5–15)
AST SERPL-CCNC: 55 U/L (ref 1–32)
BACTERIA SPEC AEROBE CULT: NO GROWTH
BASOPHILS # BLD AUTO: 0.06 10*3/MM3 (ref 0–0.2)
BASOPHILS NFR BLD AUTO: 0.4 % (ref 0–1.5)
BILIRUB SERPL-MCNC: 0.4 MG/DL (ref 0–1.2)
BUN SERPL-MCNC: 28.9 MG/DL (ref 8–23)
BUN/CREAT SERPL: 24.7 (ref 7–25)
CALCIUM SPEC-SCNC: 8.1 MG/DL (ref 8.6–10.5)
CHLORIDE SERPL-SCNC: 112 MMOL/L (ref 98–107)
CO2 SERPL-SCNC: 20.5 MMOL/L (ref 22–29)
CREAT SERPL-MCNC: 1.17 MG/DL (ref 0.57–1)
DEPRECATED RDW RBC AUTO: 55.7 FL (ref 37–54)
EGFRCR SERPLBLD CKD-EPI 2021: 47.3 ML/MIN/1.73
EOSINOPHIL # BLD AUTO: 0.22 10*3/MM3 (ref 0–0.4)
EOSINOPHIL NFR BLD AUTO: 1.5 % (ref 0.3–6.2)
ERYTHROCYTE [DISTWIDTH] IN BLOOD BY AUTOMATED COUNT: 13.8 % (ref 12.3–15.4)
GLOBULIN UR ELPH-MCNC: 3 GM/DL
GLUCOSE SERPL-MCNC: 99 MG/DL (ref 65–99)
HCT VFR BLD AUTO: 31.1 % (ref 34–46.6)
HGB BLD-MCNC: 9.3 G/DL (ref 12–15.9)
IMM GRANULOCYTES # BLD AUTO: 0.09 10*3/MM3 (ref 0–0.05)
IMM GRANULOCYTES NFR BLD AUTO: 0.6 % (ref 0–0.5)
LYMPHOCYTES # BLD AUTO: 2.86 10*3/MM3 (ref 0.7–3.1)
LYMPHOCYTES NFR BLD AUTO: 20.1 % (ref 19.6–45.3)
MCH RBC QN AUTO: 32.4 PG (ref 26.6–33)
MCHC RBC AUTO-ENTMCNC: 29.9 G/DL (ref 31.5–35.7)
MCV RBC AUTO: 108.4 FL (ref 79–97)
MONOCYTES # BLD AUTO: 1.14 10*3/MM3 (ref 0.1–0.9)
MONOCYTES NFR BLD AUTO: 8 % (ref 5–12)
NEUTROPHILS NFR BLD AUTO: 69.4 % (ref 42.7–76)
NEUTROPHILS NFR BLD AUTO: 9.86 10*3/MM3 (ref 1.7–7)
NRBC BLD AUTO-RTO: 0 /100 WBC (ref 0–0.2)
PLATELET # BLD AUTO: 194 10*3/MM3 (ref 140–450)
PMV BLD AUTO: 11.4 FL (ref 6–12)
POTASSIUM SERPL-SCNC: 3.9 MMOL/L (ref 3.5–5.2)
PROT SERPL-MCNC: 5.9 G/DL (ref 6–8.5)
RBC # BLD AUTO: 2.87 10*6/MM3 (ref 3.77–5.28)
SODIUM SERPL-SCNC: 142 MMOL/L (ref 136–145)
WBC NRBC COR # BLD AUTO: 14.23 10*3/MM3 (ref 3.4–10.8)

## 2025-06-11 PROCEDURE — 97166 OT EVAL MOD COMPLEX 45 MIN: CPT

## 2025-06-11 PROCEDURE — 25010000002 CEFTRIAXONE PER 250 MG: Performed by: INTERNAL MEDICINE

## 2025-06-11 PROCEDURE — 99232 SBSQ HOSP IP/OBS MODERATE 35: CPT | Performed by: INTERNAL MEDICINE

## 2025-06-11 PROCEDURE — 97116 GAIT TRAINING THERAPY: CPT

## 2025-06-11 PROCEDURE — 97530 THERAPEUTIC ACTIVITIES: CPT

## 2025-06-11 PROCEDURE — 25010000002 DOXYCYCLINE 100 MG RECONSTITUTED SOLUTION 1 EACH VIAL: Performed by: INTERNAL MEDICINE

## 2025-06-11 PROCEDURE — 25010000002 MORPHINE PER 10 MG: Performed by: ORTHOPAEDIC SURGERY

## 2025-06-11 PROCEDURE — 25010000002 HEPARIN (PORCINE) PER 1000 UNITS: Performed by: INTERNAL MEDICINE

## 2025-06-11 PROCEDURE — 25810000003 SODIUM CHLORIDE 0.9 % SOLUTION: Performed by: INTERNAL MEDICINE

## 2025-06-11 PROCEDURE — 85025 COMPLETE CBC W/AUTO DIFF WBC: CPT | Performed by: INTERNAL MEDICINE

## 2025-06-11 PROCEDURE — 80053 COMPREHEN METABOLIC PANEL: CPT | Performed by: INTERNAL MEDICINE

## 2025-06-11 PROCEDURE — 63710000001 PREDNISONE PER 5 MG: Performed by: ORTHOPAEDIC SURGERY

## 2025-06-11 RX ORDER — SODIUM CHLORIDE 9 MG/ML
100 INJECTION, SOLUTION INTRAVENOUS CONTINUOUS
Status: ACTIVE | OUTPATIENT
Start: 2025-06-11 | End: 2025-06-11

## 2025-06-11 RX ADMIN — OXYCODONE AND ACETAMINOPHEN 2 TABLET: 7.5; 325 TABLET ORAL at 17:22

## 2025-06-11 RX ADMIN — SODIUM CHLORIDE 100 ML/HR: 9 INJECTION, SOLUTION INTRAVENOUS at 17:23

## 2025-06-11 RX ADMIN — VALACYCLOVIR HYDROCHLORIDE 500 MG: 500 TABLET, FILM COATED ORAL at 09:34

## 2025-06-11 RX ADMIN — DOXYCYCLINE 100 MG: 100 INJECTION, POWDER, LYOPHILIZED, FOR SOLUTION INTRAVENOUS at 03:05

## 2025-06-11 RX ADMIN — DOXYCYCLINE 100 MG: 100 INJECTION, POWDER, LYOPHILIZED, FOR SOLUTION INTRAVENOUS at 15:14

## 2025-06-11 RX ADMIN — GABAPENTIN 400 MG: 400 CAPSULE ORAL at 15:13

## 2025-06-11 RX ADMIN — Medication 2000 UNITS: at 09:35

## 2025-06-11 RX ADMIN — SENNOSIDES, DOCUSATE SODIUM 2 TABLET: 50; 8.6 TABLET, FILM COATED ORAL at 09:32

## 2025-06-11 RX ADMIN — LOSARTAN POTASSIUM 100 MG: 50 TABLET, FILM COATED ORAL at 09:33

## 2025-06-11 RX ADMIN — HEPARIN SODIUM 5000 UNITS: 5000 INJECTION INTRAVENOUS; SUBCUTANEOUS at 20:49

## 2025-06-11 RX ADMIN — OXYCODONE AND ACETAMINOPHEN 2 TABLET: 7.5; 325 TABLET ORAL at 09:50

## 2025-06-11 RX ADMIN — GABAPENTIN 400 MG: 400 CAPSULE ORAL at 09:33

## 2025-06-11 RX ADMIN — OXYCODONE AND ACETAMINOPHEN 2 TABLET: 7.5; 325 TABLET ORAL at 05:13

## 2025-06-11 RX ADMIN — FOLIC ACID 1000 MCG: 1 TABLET ORAL at 09:32

## 2025-06-11 RX ADMIN — Medication 10 ML: at 09:35

## 2025-06-11 RX ADMIN — HEPARIN SODIUM 5000 UNITS: 5000 INJECTION INTRAVENOUS; SUBCUTANEOUS at 15:13

## 2025-06-11 RX ADMIN — Medication 10 ML: at 20:49

## 2025-06-11 RX ADMIN — CYCLOBENZAPRINE 10 MG: 10 TABLET, FILM COATED ORAL at 09:32

## 2025-06-11 RX ADMIN — CYCLOBENZAPRINE 10 MG: 10 TABLET, FILM COATED ORAL at 15:13

## 2025-06-11 RX ADMIN — OXYCODONE AND ACETAMINOPHEN 2 TABLET: 7.5; 325 TABLET ORAL at 00:34

## 2025-06-11 RX ADMIN — HEPARIN SODIUM 5000 UNITS: 5000 INJECTION INTRAVENOUS; SUBCUTANEOUS at 05:13

## 2025-06-11 RX ADMIN — CEFTRIAXONE 1000 MG: 1 INJECTION, POWDER, FOR SOLUTION INTRAMUSCULAR; INTRAVENOUS at 15:15

## 2025-06-11 RX ADMIN — MORPHINE SULFATE 4 MG: 4 INJECTION, SOLUTION INTRAMUSCULAR; INTRAVENOUS at 20:52

## 2025-06-11 RX ADMIN — CYCLOBENZAPRINE 10 MG: 10 TABLET, FILM COATED ORAL at 20:48

## 2025-06-11 RX ADMIN — FAMOTIDINE 40 MG: 20 TABLET, FILM COATED ORAL at 09:35

## 2025-06-11 RX ADMIN — GABAPENTIN 400 MG: 400 CAPSULE ORAL at 20:48

## 2025-06-11 RX ADMIN — ROSUVASTATIN 20 MG: 20 TABLET, FILM COATED ORAL at 09:30

## 2025-06-11 RX ADMIN — PREDNISONE 4 MG: 1 TABLET ORAL at 09:50

## 2025-06-11 NOTE — THERAPY EVALUATION
Patient Name: Tiara Stubbs  : 1945    MRN: [id number]                              Today's Date: 2025       Admit Date: 2025    Visit Dx:     ICD-10-CM ICD-9-CM   1. Shoulder fracture, right, closed, initial encounter  S42.91XA 812.00   2. Closed fracture of right foot, initial encounter  S92.901A 825.20   3. Acute right ankle pain  M25.571 719.47     338.19   4. Post-operative state  Z98.890 V45.89     Patient Active Problem List   Diagnosis    Overweight (BMI 25.0-29.9)    Immunization due    Chronic cough    SOB (shortness of breath)    Shoulder fracture, right, closed, initial encounter    Closed fracture of bone of right foot    Acute right ankle pain     Past Medical History:   Diagnosis Date    Acid reflux     Cancer     skin    Fibromyalgia     High cholesterol     Hx: recurrent pneumonia     Hypertension     Lupus     Osteoarthritis     Rheumatoid arthritis     Shoulder injury      Past Surgical History:   Procedure Laterality Date    CARPAL TUNNEL RELEASE Bilateral     KNEE SURGERY Right     TOTAL WRIST ARTHROPLASTY Right       General Information       Row Name 25 1414          OT Time and Intention    Subjective Information no complaints  -     Document Type therapy note (daily note)  -     Mode of Treatment individual therapy;occupational therapy  -     Patient Effort good  -     Comment Patient agreeable to therapy. Pleasantly confused.  -       Row Name 25 1414          General Information    Patient Profile Reviewed yes  -     Existing Precautions/Restrictions fall  -     Barriers to Rehab medically complex;cognitive status  -       Row Name 25 1414          Cognition    Orientation Status (Cognition) oriented to;person;verbal cues/prompts needed for orientation;situation  -       Row Name 25 1414          Safety Issues/Impairments Affecting Functional Mobility    Safety Issues Affecting Function (Mobility) awareness of need for  assistance;insight into deficits/self-awareness  -     Impairments Affecting Function (Mobility) balance;cognition;endurance/activity tolerance;pain;range of motion (ROM);strength  -     Cognitive Impairments, Mobility Safety/Performance insight into deficits/self-awareness;awareness, need for assistance  -               User Key  (r) = Recorded By, (t) = Taken By, (c) = Cosigned By      Initials Name Provider Type     Katy Malik OT Occupational Therapist                     Mobility/ADL's       Row Name 06/11/25 1415          Bed Mobility    Bed Mobility supine-sit  -     Supine-Sit [place] (Bed Mobility) moderate assist (50% patient effort);2 person assist  -     Assistive Device (Bed Mobility) bed rails;head of bed elevated  -       Row Name 06/11/25 1415          Transfers    Transfers sit-stand transfer;stand-sit transfer;bed-chair transfer  -     Comment, (Transfers) handheld assist  -       Row Name 06/11/25 1415          Bed-Chair Transfer    Bed-Chair [place] (Transfers) minimum assist (75% patient effort);2 person assist  -KP       Row Name 06/11/25 1415          Sit-Stand Transfer    Sit-Stand [place] (Transfers) minimum assist (75% patient effort);2 person assist  -KP       Row Name 06/11/25 1415          Stand-Sit Transfer    Stand-Sit [place] (Transfers) minimum assist (75% patient effort);2 person assist  -KP               User Key  (r) = Recorded By, (t) = Taken By, (c) = Cosigned By      Initials Name Provider Type     Katy Malik OT Occupational Therapist                   Obj/Interventions       Row Name 06/11/25 1416          Motor Skills    Motor Skills functional endurance  -     Functional Endurance fair  -       Row Name 06/11/25 1416          Balance    Balance Assessment sitting static balance  -     Static Sitting Balance contact guard  -     Balance Interventions sitting;static;minimal challenge;occupation based/functional task  -                User Key  (r) = Recorded By, (t) = Taken By, (c) = Cosigned By      Initials Name Provider Type    Katy Sahu OT Occupational Therapist                   Goals/Plan    No documentation.                  Clinical Impression       Row Name 06/11/25 1416          Pain Scale: FACES Pre/Post-Treatment    Pain: FACES Scale, Pretreatment 2-->hurts little bit  -KP     Posttreatment Pain Rating 2-->hurts little bit  -       Row Name 06/11/25 1416          Plan of Care Review    Plan of Care Reviewed With patient  -     Progress improving  -     Outcome Evaluation Patient seen for OT treatment. Patient continues to be pleasantly confused, but receptive to reorientation. She had sling in place on RUE, and boot donned on RLE prior to transfer to chair. Continue plan of care. Patient would benefit from intensive therapy to promote return home, without therapy she is a risk for progressive decline and/or injury from current functional performance.  -       Row Name 06/11/25 1416          Therapy Assessment/Plan (OT)    Rehab Potential (OT) good  -[facility] Name 06/11/25 1416          Therapy Plan Review/Discharge Plan (OT)    Anticipated Discharge Disposition (OT) inpatient rehabilitation facility;skilled nursing facility  -       Row Name 06/11/25 1416          Positioning and Restraints    Pre-Treatment Position in bed  -     Post Treatment Position chair  -     In Chair sitting;call light within reach;encouraged to call for assist;exit alarm on;notified [facility]  -               User Key  (r) = Recorded By, (t) = Taken By, (c) = Cosigned By      Initials Name Provider Type    Katy Sahu OT Occupational Therapist                   Outcome Measures    No documentation.                     OT Recommendation and Plan  Planned Therapy Interventions (OT): activity tolerance training, BADL retraining, functional balance retraining, patient/caregiver education/training, passive  ROM/stretching, occupation/activity based interventions, transfer/mobility retraining, strengthening exercise, ROM/therapeutic exercise  Therapy Frequency (OT): 5 times/wk  Plan of Care Review  Plan of Care Reviewed With: patient  Progress: improving  Outcome Evaluation: Patient seen for OT treatment. Patient continues to be pleasantly confused, but receptive to reorientation. She had sling in place on RUE, and boot donned on RLE prior to transfer to chair. Continue plan of care. Patient would benefit from intensive therapy to promote return home, without therapy she is a risk for progressive decline and/or injury from current functional performance.     Time Calculation:         Time Calculation- OT       Row Name 06/11/25 1418             Time Calculation- OT    OT Received On 06/11/25  -                User Key  (r) = Recorded By, (t) = Taken By, (c) = Cosigned By      Initials Name Provider Type    Katy Sahu OT Occupational Therapist                  Therapy Charges for Today       Code Description Service Date Service Provider Modifiers Qty    26514790579  OT EVAL MOD COMPLEXITY 4 6/10/2025 Katy Malik OT GO 1    [id number]  OT EVAL MOD COMPLEXITY 4 6/11/2025 Katy Malik OT GO 1                 Katy Malik OT  6/11/2025

## 2025-06-11 NOTE — PROGRESS NOTES
Interval History:     Tiara is an 80-year-old female who is currently being followed by orthopedics.  She is postoperative day #2 after undergoing a reverse right total shoulder arthroplasty.  Patient reports that she is experiencing mild pain to the shoulder at this time but doing well overall.  It is noted by nursing staff that the patient has been slightly confused throughout the evening and into the morning.  No acute events were noted overnight.  Patient currently denies any acute chest pain or shortness of air.        Objective     Vital Signs  Temp:  [96.1 °F (35.6 °C)-98.7 °F (37.1 °C)] 98.7 °F (37.1 °C)  Heart Rate:  [88-98] 89  Resp:  [16-20] 16  BP: ()/(44-54) 121/50    Labs & Rads  Lab Results (last 72 hours)       Procedure Component Value Units Date/Time    CBC & Differential [[id number]] Collected: 06/11/25 0547    Specimen: Blood Updated: 06/11/25 0715    Narrative:      The following orders were created for panel order CBC & Differential.  Procedure                               Abnormality         Status                     ---------                               -----------         ------                     CBC Auto Differential[[id number]]                                                       Scan Slide[[id number]]                                                                    Please view results for these tests on the individual orders.    CBC Auto Differential [[id number]] Updated: 06/11/25 0715    Specimen: Blood     Urine Culture - Urine, Urine, Catheter [[id number]]  (Normal) Collected: 06/10/25 1448    Specimen: Urine, Catheter Updated: 06/11/25 0708     Urine Culture No growth    Urinalysis, Microscopic Only - Urine, Catheter [[id number]]  (Abnormal) Collected: 06/10/25 1448    Specimen: Urine, Catheter Updated: 06/10/25 1503     RBC, UA Too Numerous to Count /HPF      WBC, UA 21-50 /HPF      Bacteria, UA None Seen /HPF      Squamous Epithelial Cells, UA 3-6 /HPF      Hyaline  Casts, UA 3-6 /LPF      Methodology Automated Microscopy    Urinalysis With Culture If Indicated - Urine, Catheter [[id number]]  (Abnormal) Collected: 06/10/25 1448    Specimen: Urine, Catheter Updated: 06/10/25 1503     Color, UA Yellow     Appearance, UA Clear     pH, UA 6.0     Specific Gravity, UA 1.019     Glucose, UA Negative     Ketones, UA Negative     Bilirubin, UA Negative     Blood, UA Moderate (2+)     Protein, UA Trace     Leuk Esterase, UA Moderate (2+)     Nitrite, UA Negative     Urobilinogen, UA 1.0 E.U./dL    Narrative:      In absence of clinical symptoms, the presence of pyuria, bacteria, and/or nitrites on the urinalysis result does not correlate with infection.    Hepatitis Panel, Acute [[id number]]  (Normal) Collected: 06/10/25 0259    Specimen: Blood Updated: 06/10/25 0359     Hepatitis B Surface Ag Non-Reactive     Hep A IgM Non-Reactive     Hep B C IgM Non-Reactive     Hepatitis C Ab Non-Reactive    Narrative:      Results may be falsely decreased if patient taking Biotin.     Comprehensive Metabolic Panel [[id number]]  (Abnormal) Collected: 06/10/25 0259    Specimen: Blood Updated: 06/10/25 0354     Glucose 121 mg/dL      BUN 24.9 mg/dL      Creatinine 1.27 mg/dL      Sodium 137 mmol/L      Potassium 4.0 mmol/L      Chloride 107 mmol/L      CO2 18.3 mmol/L      Calcium 8.0 mg/dL      Total Protein 5.9 g/dL      Albumin 3.1 g/dL      ALT (SGPT) 18 U/L      AST (SGOT) 42 U/L      Alkaline Phosphatase 46 U/L      Total Bilirubin 0.4 mg/dL      Globulin 2.8 gm/dL      A/G Ratio 1.1 g/dL      BUN/Creatinine Ratio 19.6     Anion Gap 11.7 mmol/L      eGFR 42.8 mL/min/1.73     Narrative:      GFR Categories in Chronic Kidney Disease (CKD)              GFR Category          GFR (mL/min/1.73)    Interpretation  G1                    90 or greater        Normal or high (1)  G2                    60-89                Mild decrease (1)  G3a                   45-59                Mild to moderate  decrease  G3b                   30-44                Moderate to severe decrease  G4                    15-29                Severe decrease  G5                    14 or less           Kidney failure    (1)In the absence of evidence of kidney disease, neither GFR category G1 or G2 fulfill the criteria for CKD.    eGFR calculation 2021 CKD-EPI creatinine equation, which does not include race as a factor    CBC & Differential [[id number]]  (Abnormal) Collected: 06/10/25 0259    Specimen: Blood Updated: 06/10/25 0337    Narrative:      The following orders were created for panel order CBC & Differential.  Procedure                               Abnormality         Status                     ---------                               -----------         ------                     CBC Auto Differential[[id number]]        Abnormal            Final result                 Please view results for these tests on the individual orders.    CBC Auto Differential [[id number]]  (Abnormal) Collected: 06/10/25 0259    Specimen: Blood Updated: 06/10/25 0337     WBC 20.69 10*3/mm3      RBC 3.09 10*6/mm3      Hemoglobin 10.0 g/dL      Hematocrit 31.9 %      .2 fL      MCH 32.4 pg      MCHC 31.3 g/dL      RDW 13.8 %      RDW-SD 51.9 fl      MPV 11.1 fL      Platelets 162 10*3/mm3      Neutrophil % 77.3 %      Lymphocyte % 9.8 %      Monocyte % 12.1 %      Eosinophil % 0.0 %      Basophil % 0.2 %      Immature Grans % 0.6 %      Neutrophils, Absolute 16.00 10*3/mm3      Lymphocytes, Absolute 2.02 10*3/mm3      Monocytes, Absolute 2.50 10*3/mm3      Eosinophils, Absolute 0.00 10*3/mm3      Basophils, Absolute 0.04 10*3/mm3      Immature Grans, Absolute 0.13 10*3/mm3      nRBC 0.0 /100 WBC     Magnesium [[id number]]  (Normal) Collected: 06/09/25 0722    Specimen: Blood Updated: 06/09/25 0923     Magnesium 2.4 mg/dL     Basic Metabolic Panel [[id number]]  (Abnormal) Collected: 06/09/25 0722    Specimen: Blood Updated: 06/09/25 0814      Glucose 100 mg/dL      BUN 16.7 mg/dL      Creatinine 0.78 mg/dL      Sodium 137 mmol/L      Potassium 4.1 mmol/L      Comment: Specimen slightly hemolyzed.  Result may be falsely elevated.        Chloride 105 mmol/L      CO2 18.9 mmol/L      Calcium 8.8 mg/dL      BUN/Creatinine Ratio 21.4     Anion Gap 13.1 mmol/L      eGFR 76.9 mL/min/1.73     Narrative:      GFR Categories in Chronic Kidney Disease (CKD)              GFR Category          GFR (mL/min/1.73)    Interpretation  G1                    90 or greater        Normal or high (1)  G2                    60-89                Mild decrease (1)  G3a                   45-59                Mild to moderate decrease  G3b                   30-44                Moderate to severe decrease  G4                    15-29                Severe decrease  G5                    14 or less           Kidney failure    (1)In the absence of evidence of kidney disease, neither GFR category G1 or G2 fulfill the criteria for CKD.    eGFR calculation 2021 CKD-EPI creatinine equation, which does not include race as a factor    CBC & Differential [[id number]]  (Abnormal) Collected: 06/09/25 0722    Specimen: Blood Updated: 06/09/25 0743    Narrative:      The following orders were created for panel order CBC & Differential.  Procedure                               Abnormality         Status                     ---------                               -----------         ------                     CBC Auto Differential[[id number]]        Abnormal            Final result                 Please view results for these tests on the individual orders.    CBC Auto Differential [[id number]]  (Abnormal) Collected: 06/09/25 0722    Specimen: Blood Updated: 06/09/25 0743     WBC 17.80 10*3/mm3      RBC 3.85 10*6/mm3      Hemoglobin 12.7 g/dL      Hematocrit 39.6 %      .9 fL      MCH 33.0 pg      MCHC 32.1 g/dL      RDW 14.1 %      RDW-SD 53.8 fl      MPV 11.2 fL      Platelets 183 10*3/mm3       Neutrophil % 69.6 %      Lymphocyte % 16.0 %      Monocyte % 13.1 %      Eosinophil % 0.3 %      Basophil % 0.3 %      Immature Grans % 0.7 %      Neutrophils, Absolute 12.37 10*3/mm3      Lymphocytes, Absolute 2.85 10*3/mm3      Monocytes, Absolute 2.33 10*3/mm3      Eosinophils, Absolute 0.06 10*3/mm3      Basophils, Absolute 0.06 10*3/mm3      Immature Grans, Absolute 0.13 10*3/mm3      nRBC 0.0 /100 WBC     Protime-INR [[id number]]  (Normal) Collected: 06/09/25 0539    Specimen: Blood Updated: 06/09/25 0649     Protime 14.2 Seconds      INR 1.04    Narrative:      Suggested INR therapeutic range for stable oral anticoagulant therapy:    Low Intensity therapy:   1.5-2.0  Moderate Intensity therapy:   2.0-3.0  High Intensity therapy:   2.5-4.0          Imaging Results (Last 72 Hours)       Procedure Component Value Units Date/Time    XR Chest 1 View [[id number]] Collected: 06/10/25 1503     Updated: 06/10/25 1509    Narrative:      EXAM:    XR Chest, 1 View     EXAM DATE:    6/10/2025 3:03 PM     CLINICAL HISTORY:    SIRS; S42.91XA-Fracture of right shoulder girdle, part unspecified,  initial encounter for closed fracture; S92.901A-Unspecified fracture of  right foot, initial encounter for closed fracture; M25.571-Pain in right  ankle and joints of right foot; Z98.890-Other specified postprocedural  states     TECHNIQUE:    Frontal view of the chest.     COMPARISON:    6/7/2025     FINDINGS:    Lungs and pleural spaces:  Right lower lobe airspace disease.  No  consolidation.  No pneumothorax.    Heart:  Unremarkable as visualized.  No cardiomegaly.    Mediastinum:  Unremarkable as visualized.  Normal mediastinal contour.    Bones/joints:  Unremarkable as visualized.  No acute fracture.       Impression:        Right lower lobe airspace disease.     This report was finalized on 6/10/2025 3:07 PM by Dr. Aime Haywood MD.       XR Shoulder 2+ View Right [[id number]] Collected: 06/09/25 1636     Updated: 06/09/25  1639    Narrative:      EXAM:    XR Right Shoulder Complete, 2 or More Views     EXAM DATE:    6/9/2025 4:36 PM     CLINICAL HISTORY:    Post-Op 1 view AP only; S42.91XA-Fracture of right shoulder girdle,  part unspecified, initial encounter for closed fracture;  S92.901A-Unspecified fracture of right foot, initial encounter for  closed fracture; M25.571-Pain in right ankle and joints of right foot;  Z98.890-Other specified postprocedural states     TECHNIQUE:    Two or more views of the right shoulder.     COMPARISON:    No relevant prior studies available.     FINDINGS:    Bones/joints:  Right total shoulder arthroplasty.  Components appear  well seated.  No acute fracture.  No dislocation.    Soft tissues:  Unremarkable as visualized.       Impression:        No acute findings in the right shoulder.     This report was finalized on 6/9/2025 4:37 PM by Dr. Aime Haywood MD.       XR Shoulder 2+ View Right [[id number]] Collected: 06/08/25 1531     Updated: 06/08/25 1534    Narrative:      INDICATION: Pain and injury.     TECHNIQUE: 3 views of the right shoulder.     COMPARISON: No relevant priors.       Impression:      FINDINGS/IMPRESSION: Comminuted, displaced humeral head/neck fracture.  Anterior/inferior dislocation of the right shoulder. Soft tissue  swelling.     This report was finalized on 6/8/2025 3:32 PM by Alex Pallas, DO.       XR Foot 3+ View Right [[id number]] Collected: 06/08/25 1530     Updated: 06/08/25 1533    Narrative:      INDICATION: Pain and injury.     TECHNIQUE: 3 views of the right foot. 3 views of the right ankle     COMPARISON: No relevant priors.     FINDINGS:   Evaluation of the right ankle and the right foot.     Mildly displaced fracture of the proximal aspect of the fifth  metatarsal. Mildly displaced distal fibular fracture. Remaining osseous  structures intact. No dislocation. No lytic or blastic bony lesions  seen. Mild diffuse joint space loss.     Soft tissue swelling.        Impression:      Mildly displaced fracture of the proximal aspect of the fifth  metatarsal. Mildly displaced distal fibular fracture.     This report was finalized on 6/8/2025 3:31 PM by Alex Pallas, DO.       XR Ankle 3+ View Right [[id number]] Collected: 06/08/25 1530     Updated: 06/08/25 1533    Narrative:      INDICATION: Pain and injury.     TECHNIQUE: 3 views of the right foot. 3 views of the right ankle     COMPARISON: No relevant priors.     FINDINGS:   Evaluation of the right ankle and the right foot.     Mildly displaced fracture of the proximal aspect of the fifth  metatarsal. Mildly displaced distal fibular fracture. Remaining osseous  structures intact. No dislocation. No lytic or blastic bony lesions  seen. Mild diffuse joint space loss.     Soft tissue swelling.       Impression:      Mildly displaced fracture of the proximal aspect of the fifth  metatarsal. Mildly displaced distal fibular fracture.     This report was finalized on 6/8/2025 3:31 PM by Alex Pallas, DO.       XR Chest 1 View [[id number]] Collected: 06/08/25 1334     Updated: 06/08/25 1340    Narrative:      PROCEDURE: Chest x-ray examination performed on June 7, 2025. Single  view.     HISTORY: Cough.     COMPARISON: None.     FINDINGS:     Mild enlarged heart size  Coarsened bronchovascular pattern to the lungs.  Right humeral head and neck fracture with anterior dislocation of the  humeral head.  No free air in the upper abdomen.  Slight levoconvex curve at the mid thoracic spine.       Impression:         Mild enlarged heart size.  Hypoinflated lungs.  Right humeral head and neck fracture with anterior dislocation.  No pleural effusion. No pneumothorax.  Mild levoconvex curve at the mid thoracic spine.     This report was finalized on 6/8/2025 1:38 PM by Bobby Bright MD.                   Physical Exam:  Constitutional: Patient is alert to person and place but confused as to time.  No acute distress noted.     Musculoskeletal: Upon  examination of the right shoulder there is a surgical incision noted anteriorly with wound edges well-approximated.  There is a bulky surgical dressing that is clean, dry, and intact.  No evidence of erythema, active drainage, or signs of an infectious process.  Patient remains in an abductor sling to the right upper extremity.  Patient sensation appears to be grossly intact to light touch with brisk capillary refill.  Patient is able to flex and extend all digits of the right hand without complication.  There is a 2/4 palpable pulse radially.                 Upon examination of the right foot, there is  ecchymosis noted. No evidence of edema, erythema, wounds, drainage or signs of an infectious process. Tissues are soft. She is able to flex and extend the digits. Patient is able to plantar and dorsiflex at the ankle. Capillary refill is brisk and light touch sensation is intact distally. There is a positive pulse distally.               Assessment:  S/p reverse right total shoulder arthroplasty  Right 5th metatarsal fracture          Plan:  Continue with pain control.  Activity as tolerated with pain as the guide.     PT/OT: Patient is to remain in the sling to the right upper extremity.  Patient should refrain from any heavy lifting, pushing, pulling of the right upper extremity.     Dressing: Maintain the surgical dressing at this time.  Dressing may be reinforced for saturation.     CAM boot to the right lower extremity and weight bear as tolerated.      DVT/PPx: Patient is currently on  heparin 5000 units subcutaneous 3 times daily     Orthopedics will continue to follow.           Discharge planning to be determined per hospitalist.        DIEGO Kent  06/11/25  08:13 EDT

## 2025-06-11 NOTE — PROGRESS NOTES
Psychiatric HOSPITALIST PROGRESS NOTE     Patient Identification:  Name:  Tiara Stubbs  Age:  80 y.o.  Sex:  female  :  1945  MRN:  [id number]  Visit Number:  [id number]  ROOM: [address] [place] [address]     Primary Care Provider:  Paula Downey APRN    Length of stay in inpatient status:  4    Subjective     Chief Compliant:  No chief complaint on file.      History of Presenting Illness:    Patient sitting in bedside chair. Reported overall feeling better. Shoulder and leg pain controlled. She was oriented but toward the end odf my conversation was having delusions of bugs crawling. Pleasantly confused.     ROS:  Otherwise 10 point ROS negative other than documented above in HPI.     Objective     Current Hospital Meds:cefTRIAXone, 1,000 mg, Intravenous, Q24H  cholecalciferol, 2,000 Units, Oral, Daily  cyclobenzaprine, 10 mg, Oral, TID  [START ON 2025] cyclobenzaprine, 10 mg, Oral, Nightly  doxycycline, 100 mg, Intravenous, Q12H  famotidine, 40 mg, Oral, Daily  folic acid, 1,000 mcg, Oral, Daily  gabapentin, 400 mg, Oral, TID  heparin (porcine), 5,000 Units, Subcutaneous, Q8H  losartan, 100 mg, Oral, Q24H  metoprolol succinate XL, 25 mg, Oral, Daily  predniSONE, 4 mg, Oral, Daily  rosuvastatin, 20 mg, Oral, Daily  senna-docusate sodium, 2 tablet, Oral, BID  sodium chloride, 10 mL, Intravenous, Q12H  valACYclovir, 500 mg, Oral, Daily         Current Antimicrobial Therapy:  Anti-Infectives (From admission, onward)      Ordered     Dose/Rate Route Frequency Start Stop    06/10/25 1527  cefTRIAXone (ROCEPHIN) 1,000 mg in sodium chloride 0.9 % 100 mL IVPB-VTB        Ordering Provider: Simone Coelho MD    1,000 mg  200 mL/hr over 30 Minutes Intravenous Every 24 Hours 06/10/25 1600 06/15/25 1559    06/10/25 152  doxycycline (VIBRAMYCIN) 100 mg in sodium chloride 0.9 % 100 mL IVPB-VTB        Ordering Provider: Simone Coelho MD    100 mg Intravenous Every 12 Hours 06/10/25 1600 06/15/25 1552     06/09/25 1709  ceFAZolin 2000 mg IVPB in 100 mL NS (VTB)        Ordering Provider: Ameya Carlin MD    2,000 mg  over 30 Minutes Intravenous Every 8 Hours 06/09/25 2300 06/10/25 0646    06/09/25 1219  sodium chloride 3,000 mL with polymyxin B 1,500,000 Units, vancomycin 3,000 mg irrigation        Ordering Provider: Ameya Carlin MD     Irrigation Once 06/09/25 1230 06/09/25 1332    06/09/25 1050  ceFAZolin 2000 mg IVPB in 100 mL NS (VTB)        Ordering Provider: Ameya Carlin MD    2,000 mg  over 30 Minutes Intravenous Once 06/09/25 1052 06/09/25 1524    06/07/25 2021  valACYclovir (VALTREX) tablet 500 mg        Ordering Provider: Ameya Carlin MD    500 mg Oral Daily 06/08/25 0900 06/08/26 0859          Current Diuretic Therapy:  Diuretics (From admission, onward)      None          ----------------------------------------------------------------------------------------------------------------------  Vital Signs:  Temp:  [97.6 °F (36.4 °C)-98.7 °F (37.1 °C)] 97.9 °F (36.6 °C)  Heart Rate:  [88-99] 99  Resp:  [16-20] 16  BP: ()/(44-58) 148/58  SpO2:  [93 %-96 %] 94 %  on  Flow (L/min) (Oxygen Therapy):  [1] 1;   Device (Oxygen Therapy): nasal cannula  Body mass index is 30.17 kg/m².    Wt Readings from Last 3 Encounters:   06/10/25 87.4 kg (192 lb 9.6 oz)   12/05/24 89 kg (196 lb 3.2 oz)   05/23/24 88.8 kg (195 lb 12.8 oz)     Intake & Output (last 3 days)         06/08 0701 06/09 0700 06/09 0701  06/10 0700 06/10 0701 06/11 0700 06/11 0701 06/12 0700    P.O.  540    I.V. (mL/kg)  1000 (11.4) 75.3 (0.9)     IV Piggyback  200      Total Intake(mL/kg) 600 (6.9) 1800 (20.6) 1475.3 (16.9) 540 (6.2)    Urine (mL/kg/hr) 700 (0.3) 1200 (0.6) 1250 (0.6) 1100 (1.4)    Stool        Total Output 700 1200 1250 1100    Net -100 +600 +225.3 -560                  Diet: Regular/House; Fluid Consistency: Thin (IDDSI  0)  ----------------------------------------------------------------------------------------------------------------------  Physical exam:  Constitutional:  Well-developed and well-nourished.  No respiratory distress.      HENT:  Head:  Normocephalic and atraumatic.  Mouth:  Moist mucous membranes.    Eyes:  Conjunctivae and EOM are normal. No scleral icterus.    Neck:  Neck supple.  No JVD present.    Cardiovascular:  Normal rate, regular rhythm and normal heart sounds with no murmur.  Pulmonary/Chest:  No respiratory distress, no wheezes, no crackles, with normal breath sounds and good air movement.  Abdominal:  Soft.  Bowel sounds are normal.  No distension and no tenderness.   Musculoskeletal:  R ankle not grossly abnormal. R shoulder with bandaging in place.   Neurological:  Alert and oriented to person, place, and time.  No cranial nerve deficit.  No tongue deviation.  No facial droop.  No slurred speech.   Skin:  Skin is warm and dry. No rash noted. No pallor.   Peripheral vascular:  Pulses in all 4 extremities with no clubbing, no cyanosis, no edema.  ----------------------------------------------------------------------------------------------------------------------  Tele:    ----------------------------------------------------------------------------------------------------------------------  Results from last 7 days   Lab Units 06/11/25  0833 06/10/25  0259 06/09/25  0722 06/09/25  0539   WBC 10*3/mm3 14.23* 20.69* 17.80*  --    HEMOGLOBIN g/dL 9.3* 10.0* 12.7  --    HEMATOCRIT % 31.1* 31.9* 39.6  --    MCV fL 108.4* 103.2* 102.9*  --    MCHC g/dL 29.9* 31.3* 32.1  --    PLATELETS 10*3/mm3 194 162 183  --    INR   --   --   --  1.04         Results from last 7 days   Lab Units 06/11/25  0833 06/10/25  0259 06/09/25  0722 06/07/25  1120   SODIUM mmol/L 142 137 137 143   POTASSIUM mmol/L 3.9 4.0 4.1 3.9   MAGNESIUM mg/dL  --   --  2.4  --    CHLORIDE mmol/L 112* 107 105 108*   CO2 mmol/L 20.5* 18.3* 18.9*  "22.9   BUN mg/dL 28.9* 24.9* 16.7 19.5   CREATININE mg/dL 1.17* 1.27* 0.78 0.83   CALCIUM mg/dL 8.1* 8.0* 8.8 8.7   GLUCOSE mg/dL 99 121* 100* 103*   ALBUMIN g/dL 2.9* 3.1*  --  3.8   BILIRUBIN mg/dL 0.4 0.4  --  0.6   ALK PHOS U/L 54 46  --  60   AST (SGOT) U/L 55* 42*  --  87*   ALT (SGPT) U/L 14 18  --  49*   Estimated Creatinine Clearance: 43.5 mL/min (A) (by C-G formula based on SCr of 1.17 mg/dL (H)).  No results found for: \"AMMONIA\"              No results found for: \"HGBA1C\", \"POCGLU\"  No results found for: \"TSH\", \"FREET4\"  No results found for: \"PREGTESTUR\", \"PREGSERUM\", \"HCG\", \"HCGQUANT\"  Pain Management Panel           No data to display              Brief Urine Lab Results  (Last result in the past 365 days)        Color   Clarity   Blood   Leuk Est   Nitrite   Protein   CREAT   Urine HCG        06/10/25 1448 Yellow   Clear   Moderate (2+)   Moderate (2+)   Negative   Trace                 No results found for: \"BLOODCX\"  Urine Culture   Date Value Ref Range Status   06/10/2025 No growth  Preliminary     No results found for: \"WOUNDCX\"  No results found for: \"STOOLCX\"  No results found for: \"RESPCX\"  No results found for: \"AFBCX\"        I have personally looked at the labs and they are summarized above.  ----------------------------------------------------------------------------------------------------------------------  Detailed radiology reports for the last 24 hours:    Imaging Results (Last 24 Hours)       ** No results found for the last 24 hours. **          Assessment & Plan    #Right humerus fracture and right shoulder dislocation secondary to mechanical ground level fall  #Right fifth metatarsal fracture  #Hyperlipidemia - continue rosuvastatin  #RA  #Osteoarthritis  #Lupus  #Hypertension - BP well controlled. Continue home metoprolol and losartan.  #GERD - continue famotidine  #VERONIQUE  #Leukocytosis   #Human rhinovirus   #Mild hepatocellular transaminitis - Normalized  #MAURO - Suspect prerenal in " post operative period, repeat in AM after 500 cc more IVF over 10 hours and encourage PO intake.   #Hospital delirium  #RLL pneumonia       POD#2  total reverse shoulder arthroplasty. Surgery plans nonoperatively management of metatarsal fracture with boot and WBAT. Repeat labs in AM. PRN zofran. PRN pain control. PT/OT to evaluate today. Patient with RLL infiltrate on chest x-ray, possibly aspiration in perioperative period? Will treat as aspiration pneumonia, ceftriaxone/doxy. Day 2. WBC count improving. Mentation overall improving but still with some delusions that is suspect is hospital delirium or related to pneumonia.      Code status: Full      Pending Inpatient rehab consulted.        Simone Coelho MD  [facility] Hospitalist  06/11/25  16:12 EDT

## 2025-06-11 NOTE — SIGNIFICANT NOTE
06/11/25 1533   Post Acute Pre-Cert Documentation   Request Submitted by Facility - Type: Post Acute   Post-Acute Authorization Type Submitted: IRF   Date Post Acute Pre-Cert Completed 06/11/25   Response Received from Insurance? P2P Requested   Response Communicated to:    Authorization Number: [id number]   Post Acute Pre-Cert Initiated Comment [employer] called stating that the medical director states the case lacks the support for an approval for Acute Rehab, but is offering a p2p that can be scheduled by calling [phone] with deadline of 06/12 at 3:35pm EST.  If any additional clinicals need to be faxed for a p2p, they can be faxed to [phone].

## 2025-06-11 NOTE — THERAPY TREATMENT NOTE
Acute Care - Physical Therapy Treatment Note   [facility]     Patient Name: Tiara Stubbs  : 1945  MRN: [id number]  Today's Date: 2025      Visit Dx:     ICD-10-CM ICD-9-CM   1. Shoulder fracture, right, closed, initial encounter  S42.91XA 812.00   2. Closed fracture of right foot, initial encounter  S92.901A 825.20   3. Acute right ankle pain  M25.571 719.47     338.19   4. Post-operative state  Z98.890 V45.89     Patient Active Problem List   Diagnosis    Overweight (BMI 25.0-29.9)    Immunization due    Chronic cough    SOB (shortness of breath)    Shoulder fracture, right, closed, initial encounter    Closed fracture of bone of right foot    Acute right ankle pain     Past Medical History:   Diagnosis Date    Acid reflux     Cancer     skin    Fibromyalgia     High cholesterol     Hx: recurrent pneumonia     Hypertension     Lupus     Osteoarthritis     Rheumatoid arthritis     Shoulder injury      Past Surgical History:   Procedure Laterality Date    CARPAL TUNNEL RELEASE Bilateral     KNEE SURGERY Right     TOTAL WRIST ARTHROPLASTY Right      PT Assessment (Last 12 Hours)       PT Evaluation and Treatment       Row Name 25 1515          Physical Therapy Time and Intention    Subjective Information no complaints  -KM     Document Type therapy note (daily note)  -KM     Mode of Treatment physical therapy  -KM     Patient Effort good  -KM     Symptoms Noted During/After Treatment fatigue  -KM       Row Name 25 1515          General Information    Patient Profile Reviewed yes  -KM     Patient Observations alert;cooperative;agree to therapy  -KM     Existing Precautions/Restrictions fall  -KM       Row Name 25 1515          Pain Scale: FACES Pre/Post-Treatment    Pain: FACES Scale, Pretreatment 2-->hurts little bit  -KM     Posttreatment Pain Rating 2-->hurts little bit  -KM       Row Name 25 1515          Cognition    Orientation Status (Cognition) oriented to;person;verbal  cues/prompts needed for orientation;situation  -KM     Follows Commands (Cognition) follows one-step commands  -KM       Row Name 06/11/25 1515          Bed Mobility    Bed Mobility supine-sit  -KM     Supine-Sit [place] (Bed Mobility) moderate assist (50% patient effort);2 person assist  -KM     Assistive Device (Bed Mobility) bed rails;head of bed elevated  -KM       Row Name 06/11/25 1515          Transfers    Transfers sit-stand transfer;stand-sit transfer;bed-chair transfer  -KM       Row Name 06/11/25 1515          Bed-Chair Transfer    Bed-Chair [place] (Transfers) minimum assist (75% patient effort);2 person assist  -KM     Comment, (Bed-Chair Transfer) A  -KM       Row Name 06/11/25 1515          Sit-Stand Transfer    Sit-Stand [place] (Transfers) minimum assist (75% patient effort);2 person assist  -KM     Comment, (Sit-Stand Transfer) A  -KM       Row Name 06/11/25 1515          Stand-Sit Transfer    Stand-Sit [place] (Transfers) minimum assist (75% patient effort);2 person assist  -KM     Comment, (Stand-Sit Transfer) A  -KM       Row Name 06/11/25 1515          Gait/Stairs (Locomotion)    Gait/Stairs Locomotion gait/ambulation independence;gait/ambulation assistive device;distance ambulated  -KM     [place] Level (Gait) minimum assist (75% patient effort)  -KM     Assistive Device (Gait) --  A  -     Patient was able to Ambulate yes  -KM     Distance in Feet (Gait) 5  -KM     Pattern (Gait) step-to  -KM     Comment, (Gait/Stairs) RLE boot donned for out of bed activity  -KM       Row Name 06/11/25 1515          Safety Issues/Impairments Affecting Functional Mobility    Impairments Affecting Function (Mobility) balance;cognition;endurance/activity tolerance;pain;range of motion (ROM);strength  -KM       Row Name 06/11/25 1515          Balance    Balance Assessment standing dynamic balance  -KM     Dynamic Standing Balance minimal assist;2-person assist;verbal cues  -KM        Row Name             Wound 06/09/25 1511 Right shoulder Surgical Open Surgical Incision    Wound - Properties Group Placement Date: 06/09/25  -CE Placement Time: 1511  -CE Side: Right  -CE Location: shoulder  -CE Primary Wound Type: Surgical  -CE Secondary Wound Type - Surgical: Open Surg  -CE    Retired Wound - Properties Group Placement Date: 06/09/25  -CE Placement Time: 1511  -CE Side: Right  -CE Location: shoulder  -CE    Retired Wound - Properties Group Placement Date: 06/09/25  -CE Placement Time: 1511  -CE Side: Right  -CE Location: shoulder  -CE    Retired Wound - Properties Group Date first assessed: 06/09/25  -CE Time first assessed: 1511 -CE Side: Right  -CE Location: shoulder  -CE      Row Name 06/11/25 1515          Progress Summary (PT)    Daily Progress Summary (PT) Pt. was able to demonstrate improved functional mobility skills compared ot prior session. She was able to ambulate short distance from bed-chair. She tolerated increased activity. Pt. would continue to benefit from more intense PT services.  -KM               User Key  (r) = Recorded By, (t) = Taken By, (c) = Cosigned By      Initials Name Provider Type    Julián Cunningham, RN Registered Nurse    Talat Vaughn, SHERRI Physical Therapist                    Physical Therapy Education       Title: PT OT SLP Therapies (Done)       Topic: Physical Therapy (Done)       Point: Mobility training (Done)       Learning Progress Summary            Patient Acceptance, E,TB, VU by KM at 6/10/2025 1455                      Point: Home exercise program (Done)       Learning Progress Summary            Patient Acceptance, E,TB, VU by KM at 6/10/2025 1455                      Point: Body mechanics (Done)       Learning Progress Summary            Patient Acceptance, E,TB, VU by KM at 6/10/2025 1455                      Point: Precautions (Done)       Learning Progress Summary            Patient Acceptance, E,TB, VU by KM at 6/10/2025 1455                                       User Key       Initials Effective Dates Name Provider Type Discipline     05/24/22 -  Talat Trujillo, PT Physical Therapist PT                  PT Recommendation and Plan  Anticipated Discharge Disposition (PT): inpatient rehabilitation facility  Planned Therapy Interventions (PT): balance training, bed mobility training, gait training, home exercise program, patient/family education, postural re-education, ROM (range of motion), strengthening, stretching, transfer training  Therapy Frequency (PT): 5 times/wk (5x/wk)  Progress Summary (PT)  Daily Progress Summary (PT): Pt. was able to demonstrate improved functional mobility skills compared ot prior session. She was able to ambulate short distance from bed-chair. She tolerated increased activity. Pt. would continue to benefit from more intense PT services.  Plan of Care Reviewed With: patient  Outcome Evaluation: Pt. evaluation completed during PT session. She was able to perform functional mobility skills w/ Lachelle-modA x2. She was able to transfer but unable to ambulate at time of evaluation. Pt. would benefit from increased PT services at this time.       Time Calculation:    PT Charges       Row Name 06/11/25 1515             Time Calculation    PT Received On 06/11/25  -KM         Time Calculation- PT    Total Timed Code Minutes- PT 23 minute(s)  -KM                User Key  (r) = Recorded By, (t) = Taken By, (c) = Cosigned By      Initials Name Provider Type     Talat Trujillo, SHERRI Physical Therapist                  Therapy Charges for Today       Code Description Service Date Service Provider Modifiers Qty    30073645486 HC PT EVAL MOD COMPLEXITY 4 6/10/2025 Talat Trujillo, PT GP 1    41298185036 HC PT THERAPEUTIC ACT EA 15 MIN 6/11/2025 Talat Trujillo, PT GP 1    22986331575 HC GAIT TRAINING EA 15 MIN 6/11/2025 Talat Trujillo, PT GP 1            PT G-Codes  AM-PAC 6 Clicks Score (PT): 12    Talat Trujillo PT  6/11/2025

## 2025-06-11 NOTE — PLAN OF CARE
Goal Outcome Evaluation:              Outcome Evaluation: Pt resting in bed, VSS on room air. Pt worked with PT this shift. PRN pain medication given per MAR. Pt voices no concerns or complaints at this time, will continue with POC.

## 2025-06-11 NOTE — SIGNIFICANT NOTE
06/11/25 1107   Post Acute Pre-Cert Documentation   Request Submitted by Facility - Type: Post Acute   Post-Acute Authorization Type Submitted: IRF   Date Post Acute Pre-Cert Inititated per Facility 06/11/25   Verification from Payer Yes   Accepting Facility [facility]   All Clinicals Submitted? Yes   Had Accepting Facility at Time of Submission Yes   Response Communicated to:    Authorization Number: [id number]   Post Acute Pre-Cert Initiated Comment Prior auth request for Rehab has been submitted to [employer] via [email or web address] with reference#[id number].

## 2025-06-11 NOTE — PLAN OF CARE
Goal Outcome Evaluation:  Plan of Care Reviewed With: patient        Progress: improving  Outcome Evaluation: Patient resting in bed at this time. VSS on 1L NC. Patient pleasantly confused this shift. PRN pain medications given per MAR. Patient was a transfer from 3S to 337 this shift. Patient voices no concerns at this time. Will continue with plan of care.

## 2025-06-11 NOTE — CASE MANAGEMENT/SOCIAL WORK
Discharge Planning Assessment   Kaiden     Patient Name: Tiara Stubbs  MRN: [id number]  Today's Date: 6/11/2025    Admit Date: 6/7/2025         Discharge Plan       Row Name 06/11/25 1111       Plan    Plan SS notified by  Inpatient Rehab per Liudmila that Prior auth request for Rehab has been submitted to [employer] via [email or web address] with reference#[id number]. Prior auth remains pending. SS to follow.    15:18pm: SS attempted to speak with Pt at bedside about IPR but Pt was confused. Pt discussed with Provider and Pt's dtr. Pt's dtr is agreeable for  IPR and hopes to speak with Pt about it when she is more alert. SS to follow.                       KARLY Robertson

## 2025-06-12 LAB
ANION GAP SERPL CALCULATED.3IONS-SCNC: 8.4 MMOL/L (ref 5–15)
BASOPHILS # BLD AUTO: 0.06 10*3/MM3 (ref 0–0.2)
BASOPHILS NFR BLD AUTO: 0.5 % (ref 0–1.5)
BUN SERPL-MCNC: 21.1 MG/DL (ref 8–23)
BUN/CREAT SERPL: 21.8 (ref 7–25)
CALCIUM SPEC-SCNC: 7.8 MG/DL (ref 8.6–10.5)
CHLORIDE SERPL-SCNC: 115 MMOL/L (ref 98–107)
CO2 SERPL-SCNC: 19.6 MMOL/L (ref 22–29)
CREAT SERPL-MCNC: 0.97 MG/DL (ref 0.57–1)
DEPRECATED RDW RBC AUTO: 54.4 FL (ref 37–54)
EGFRCR SERPLBLD CKD-EPI 2021: 59.2 ML/MIN/1.73
EOSINOPHIL # BLD AUTO: 0.28 10*3/MM3 (ref 0–0.4)
EOSINOPHIL NFR BLD AUTO: 2.5 % (ref 0.3–6.2)
ERYTHROCYTE [DISTWIDTH] IN BLOOD BY AUTOMATED COUNT: 13.9 % (ref 12.3–15.4)
GLUCOSE SERPL-MCNC: 84 MG/DL (ref 65–99)
HCT VFR BLD AUTO: 27.1 % (ref 34–46.6)
HGB BLD-MCNC: 8.2 G/DL (ref 12–15.9)
IMM GRANULOCYTES # BLD AUTO: 0.13 10*3/MM3 (ref 0–0.05)
IMM GRANULOCYTES NFR BLD AUTO: 1.2 % (ref 0–0.5)
LYMPHOCYTES # BLD AUTO: 2.65 10*3/MM3 (ref 0.7–3.1)
LYMPHOCYTES NFR BLD AUTO: 23.6 % (ref 19.6–45.3)
MCH RBC QN AUTO: 32.3 PG (ref 26.6–33)
MCHC RBC AUTO-ENTMCNC: 30.3 G/DL (ref 31.5–35.7)
MCV RBC AUTO: 106.7 FL (ref 79–97)
MONOCYTES # BLD AUTO: 0.95 10*3/MM3 (ref 0.1–0.9)
MONOCYTES NFR BLD AUTO: 8.5 % (ref 5–12)
NEUTROPHILS NFR BLD AUTO: 63.7 % (ref 42.7–76)
NEUTROPHILS NFR BLD AUTO: 7.15 10*3/MM3 (ref 1.7–7)
NRBC BLD AUTO-RTO: 0 /100 WBC (ref 0–0.2)
PLATELET # BLD AUTO: 193 10*3/MM3 (ref 140–450)
PMV BLD AUTO: 10.9 FL (ref 6–12)
POTASSIUM SERPL-SCNC: 4.4 MMOL/L (ref 3.5–5.2)
RBC # BLD AUTO: 2.54 10*6/MM3 (ref 3.77–5.28)
SODIUM SERPL-SCNC: 143 MMOL/L (ref 136–145)
WBC NRBC COR # BLD AUTO: 11.22 10*3/MM3 (ref 3.4–10.8)

## 2025-06-12 PROCEDURE — 97530 THERAPEUTIC ACTIVITIES: CPT

## 2025-06-12 PROCEDURE — 25010000002 HEPARIN (PORCINE) PER 1000 UNITS: Performed by: INTERNAL MEDICINE

## 2025-06-12 PROCEDURE — 99231 SBSQ HOSP IP/OBS SF/LOW 25: CPT | Performed by: INTERNAL MEDICINE

## 2025-06-12 PROCEDURE — 97116 GAIT TRAINING THERAPY: CPT

## 2025-06-12 PROCEDURE — 97535 SELF CARE MNGMENT TRAINING: CPT

## 2025-06-12 PROCEDURE — 25010000002 CEFTRIAXONE PER 250 MG: Performed by: INTERNAL MEDICINE

## 2025-06-12 PROCEDURE — 63710000001 PREDNISONE PER 5 MG: Performed by: ORTHOPAEDIC SURGERY

## 2025-06-12 PROCEDURE — 80048 BASIC METABOLIC PNL TOTAL CA: CPT | Performed by: INTERNAL MEDICINE

## 2025-06-12 PROCEDURE — 85025 COMPLETE CBC W/AUTO DIFF WBC: CPT | Performed by: INTERNAL MEDICINE

## 2025-06-12 PROCEDURE — 25010000002 DOXYCYCLINE 100 MG RECONSTITUTED SOLUTION 1 EACH VIAL: Performed by: INTERNAL MEDICINE

## 2025-06-12 RX ADMIN — ROSUVASTATIN 20 MG: 20 TABLET, FILM COATED ORAL at 08:06

## 2025-06-12 RX ADMIN — Medication 10 ML: at 08:06

## 2025-06-12 RX ADMIN — VALACYCLOVIR HYDROCHLORIDE 500 MG: 500 TABLET, FILM COATED ORAL at 08:06

## 2025-06-12 RX ADMIN — CYCLOBENZAPRINE 10 MG: 10 TABLET, FILM COATED ORAL at 20:50

## 2025-06-12 RX ADMIN — HEPARIN SODIUM 5000 UNITS: 5000 INJECTION INTRAVENOUS; SUBCUTANEOUS at 21:01

## 2025-06-12 RX ADMIN — GABAPENTIN 400 MG: 400 CAPSULE ORAL at 20:50

## 2025-06-12 RX ADMIN — Medication 2000 UNITS: at 08:06

## 2025-06-12 RX ADMIN — CEFTRIAXONE 1000 MG: 1 INJECTION, POWDER, FOR SOLUTION INTRAMUSCULAR; INTRAVENOUS at 15:13

## 2025-06-12 RX ADMIN — FOLIC ACID 1000 MCG: 1 TABLET ORAL at 08:06

## 2025-06-12 RX ADMIN — LOSARTAN POTASSIUM 100 MG: 50 TABLET, FILM COATED ORAL at 08:06

## 2025-06-12 RX ADMIN — DOXYCYCLINE 100 MG: 100 INJECTION, POWDER, LYOPHILIZED, FOR SOLUTION INTRAVENOUS at 03:06

## 2025-06-12 RX ADMIN — Medication 10 ML: at 20:50

## 2025-06-12 RX ADMIN — FAMOTIDINE 40 MG: 20 TABLET, FILM COATED ORAL at 08:06

## 2025-06-12 RX ADMIN — HEPARIN SODIUM 5000 UNITS: 5000 INJECTION INTRAVENOUS; SUBCUTANEOUS at 05:15

## 2025-06-12 RX ADMIN — GABAPENTIN 400 MG: 400 CAPSULE ORAL at 08:06

## 2025-06-12 RX ADMIN — HEPARIN SODIUM 5000 UNITS: 5000 INJECTION INTRAVENOUS; SUBCUTANEOUS at 13:29

## 2025-06-12 RX ADMIN — DOXYCYCLINE 100 MG: 100 INJECTION, POWDER, LYOPHILIZED, FOR SOLUTION INTRAVENOUS at 15:13

## 2025-06-12 RX ADMIN — SENNOSIDES, DOCUSATE SODIUM 2 TABLET: 50; 8.6 TABLET, FILM COATED ORAL at 20:49

## 2025-06-12 RX ADMIN — GABAPENTIN 400 MG: 400 CAPSULE ORAL at 15:12

## 2025-06-12 RX ADMIN — OXYCODONE AND ACETAMINOPHEN 2 TABLET: 7.5; 325 TABLET ORAL at 09:22

## 2025-06-12 RX ADMIN — SENNOSIDES, DOCUSATE SODIUM 2 TABLET: 50; 8.6 TABLET, FILM COATED ORAL at 08:06

## 2025-06-12 RX ADMIN — PREDNISONE 4 MG: 1 TABLET ORAL at 09:23

## 2025-06-12 NOTE — PROGRESS NOTES
[facility] HOSPITALIST PROGRESS NOTE     Patient Identification:  Name:  Tiara Stubbs  Age:  80 y.o.  Sex:  female  :  1945  MRN:  [id number]  Visit Number:  [id number]  ROOM: [address] [place] [address]     Primary Care Provider:  Paula Downey APRN    Length of stay in inpatient status:  5    Subjective     Chief Compliant:  No chief complaint on file.      History of Presenting Illness:    Patient was resting comfortably on my exam. She was calm and less confused today. She was agreeable for SNF in [facility] if she does not get approved for IPR.     ROS:  Otherwise 10 point ROS negative other than documented above in HPI.     Objective     Current Hospital Meds:cefTRIAXone, 1,000 mg, Intravenous, Q24H  cholecalciferol, 2,000 Units, Oral, Daily  cyclobenzaprine, 10 mg, Oral, Nightly  doxycycline, 100 mg, Intravenous, Q12H  famotidine, 40 mg, Oral, Daily  folic acid, 1,000 mcg, Oral, Daily  gabapentin, 400 mg, Oral, TID  heparin (porcine), 5,000 Units, Subcutaneous, Q8H  losartan, 100 mg, Oral, Q24H  metoprolol succinate XL, 25 mg, Oral, Daily  predniSONE, 4 mg, Oral, Daily  rosuvastatin, 20 mg, Oral, Daily  senna-docusate sodium, 2 tablet, Oral, BID  sodium chloride, 10 mL, Intravenous, Q12H  valACYclovir, 500 mg, Oral, Daily         Current Antimicrobial Therapy:  Anti-Infectives (From admission, onward)      Ordered     Dose/Rate Route Frequency Start Stop    06/10/25 1527  cefTRIAXone (ROCEPHIN) 1,000 mg in sodium chloride 0.9 % 100 mL IVPB-VTB        Ordering Provider: Simone Coelho MD    1,000 mg  200 mL/hr over 30 Minutes Intravenous Every 24 Hours 06/10/25 1600 06/15/25 1559    06/10/25 1527  doxycycline (VIBRAMYCIN) 100 mg in sodium chloride 0.9 % 100 mL IVPB-VTB        Ordering Provider: Simone Coelho MD    100 mg Intravenous Every 12 Hours 06/10/25 1600 06/15/25 1559    25 1709  ceFAZolin 2000 mg IVPB in 100 mL NS (VTB)        Ordering Provider: Ameya Carlin MD    2,339  mg  over 30 Minutes Intravenous Every 8 Hours 06/09/25 2300 06/10/25 0646    06/09/25 1219  sodium chloride 3,000 mL with polymyxin B 1,500,000 Units, vancomycin 3,000 mg irrigation        Ordering Provider: Ameya Carlin MD     Irrigation Once 06/09/25 1230 06/09/25 1332    06/09/25 1050  ceFAZolin 2000 mg IVPB in 100 mL NS (VTB)        Ordering Provider: Ameya Carlin MD    2,000 mg  over 30 Minutes Intravenous Once 06/09/25 1052 06/09/25 1524    06/07/25 2021  valACYclovir (VALTREX) tablet 500 mg        Ordering Provider: Ameya Carlin MD    500 mg Oral Daily 06/08/25 0900 06/08/26 0859          Current Diuretic Therapy:  Diuretics (From admission, onward)      None          ----------------------------------------------------------------------------------------------------------------------  Vital Signs:  Temp:  [97.5 °F (36.4 °C)-98.6 °F (37 °C)] 98.6 °F (37 °C)  Heart Rate:  [104-110] 110  Resp:  [16-20] 16  BP: (124-172)/(59-70) 124/59  SpO2:  [85 %-98 %] 96 %  on  Flow (L/min) (Oxygen Therapy):  [1] 1;   Device (Oxygen Therapy): nasal cannula  Body mass index is 30.16 kg/m².    Wt Readings from Last 3 Encounters:   06/10/25 87.4 kg (192 lb 9.6 oz)   12/05/24 89 kg (196 lb 3.2 oz)   05/23/24 88.8 kg (195 lb 12.8 oz)     Intake & Output (last 3 days)         06/09 0701  06/10 0700 06/10 0701 06/11 0700 06/11 0701  06/12 0700 06/12 0701 06/13 0700    P.O. 600 1400 1140 480    I.V. (mL/kg) 1000 (11.4) 75.3 (0.9) 701.4 (8) 198 (2.3)    IV Piggyback 200       Total Intake(mL/kg) 1800 (20.6) 1475.3 (16.9) 1841.4 (21.1) 678 (7.8)    Urine (mL/kg/hr) 1200 (0.6) 1250 (0.6) 2800 (1.3) 2100 (2.2)    Stool    0    Total Output 1200 1250 2800 2100    Net +600 +225.3 -958.6 -1422            Stool Unmeasured Occurrence    1 x          Diet: Regular/House; Fluid Consistency: Thin (IDDSI  0)  ----------------------------------------------------------------------------------------------------------------------  Physical exam:  Constitutional:  Well-developed and well-nourished.  No respiratory distress.      HENT:  Head:  Normocephalic and atraumatic.  Mouth:  Moist mucous membranes.    Eyes:  Conjunctivae and EOM are normal. No scleral icterus.    Neck:  Neck supple.  No JVD present.    Cardiovascular:  Normal rate, regular rhythm and normal heart sounds with no murmur.  Pulmonary/Chest:  No respiratory distress, no wheezes, no crackles, with normal breath sounds and good air movement.  Abdominal:  Soft.  Bowel sounds are normal.  No distension and no tenderness.   Musculoskeletal:  R ankle not grossly abnormal. R shoulder with bandaging in place.   Neurological:  Alert and oriented to person, place, and time.  No cranial nerve deficit.  No tongue deviation.  No facial droop.  No slurred speech.   Skin:  Skin is warm and dry. No rash noted. No pallor.   Peripheral vascular:  Pulses in all 4 extremities with no clubbing, no cyanosis, no edema.  ----------------------------------------------------------------------------------------------------------------------  Tele:    ----------------------------------------------------------------------------------------------------------------------  Results from last 7 days   Lab Units 06/12/25  0201 06/11/25  0833 06/10/25  0259 06/09/25  0722 06/09/25  0539   WBC 10*3/mm3 11.22* 14.23* 20.69*   < >  --    HEMOGLOBIN g/dL 8.2* 9.3* 10.0*   < >  --    HEMATOCRIT % 27.1* 31.1* 31.9*   < >  --    MCV fL 106.7* 108.4* 103.2*   < >  --    MCHC g/dL 30.3* 29.9* 31.3*   < >  --    PLATELETS 10*3/mm3 193 194 162   < >  --    INR   --   --   --   --  1.04    < > = values in this interval not displayed.         Results from last 7 days   Lab Units 06/12/25  0201 06/11/25  0833 06/10/25  0259 06/09/25  0722 06/07/25  1120   SODIUM mmol/L 143 142 137 137 143   POTASSIUM  "mmol/L 4.4 3.9 4.0 4.1 3.9   MAGNESIUM mg/dL  --   --   --  2.4  --    CHLORIDE mmol/L 115* 112* 107 105 108*   CO2 mmol/L 19.6* 20.5* 18.3* 18.9* 22.9   BUN mg/dL 21.1 28.9* 24.9* 16.7 19.5   CREATININE mg/dL 0.97 1.17* 1.27* 0.78 0.83   CALCIUM mg/dL 7.8* 8.1* 8.0* 8.8 8.7   GLUCOSE mg/dL 84 99 121* 100* 103*   ALBUMIN g/dL  --  2.9* 3.1*  --  3.8   BILIRUBIN mg/dL  --  0.4 0.4  --  0.6   ALK PHOS U/L  --  54 46  --  60   AST (SGOT) U/L  --  55* 42*  --  87*   ALT (SGPT) U/L  --  14 18  --  49*   Estimated Creatinine Clearance: 52.5 mL/min (by C-G formula based on SCr of 0.97 mg/dL).  No results found for: \"AMMONIA\"              No results found for: \"HGBA1C\", \"POCGLU\"  No results found for: \"TSH\", \"FREET4\"  No results found for: \"PREGTESTUR\", \"PREGSERUM\", \"HCG\", \"HCGQUANT\"  Pain Management Panel           No data to display              Brief Urine Lab Results  (Last result in the past 365 days)        Color   Clarity   Blood   Leuk Est   Nitrite   Protein   CREAT   Urine HCG        06/10/25 1448 Yellow   Clear   Moderate (2+)   Moderate (2+)   Negative   Trace                 No results found for: \"BLOODCX\"  Urine Culture   Date Value Ref Range Status   06/10/2025 No growth  Final     No results found for: \"WOUNDCX\"  No results found for: \"STOOLCX\"  No results found for: \"RESPCX\"  No results found for: \"AFBCX\"        I have personally looked at the labs and they are summarized above.  ----------------------------------------------------------------------------------------------------------------------  Detailed radiology reports for the last 24 hours:    Imaging Results (Last 24 Hours)       ** No results found for the last 24 hours. **          Assessment & Plan    #Right humerus fracture and right shoulder dislocation secondary to mechanical ground level fall  #Right fifth metatarsal fracture  #Hyperlipidemia - continue rosuvastatin  #RA  #Osteoarthritis  #Lupus  #Hypertension - BP well controlled. Continue " home metoprolol and losartan.  #GERD - continue famotidine  #VERONIQUE  #Leukocytosis   #Human rhinovirus   #Mild hepatocellular transaminitis - Normalized  #MAURO - Suspect prerenal in post operative period, improved with IVF and improved intake.   #Hospital delirium  #RLL pneumonia       POD#3  total reverse shoulder arthroplasty. Surgery plans nonoperatively management of metatarsal fracture with boot and WBAT. Repeat labs in AM. PRN zofran. PRN pain control. PT/OT to evaluate today. Patient with RLL infiltrate on chest x-ray, possibly aspiration in perioperative period? Will treat as aspiration pneumonia, ceftriaxone/doxy. Day 3/5. WBC count improving. Mentation overall improving.      Code status: Full      Pending Inpatient rehab consulted. Short term SNF if declined.        Simone Coelho MD  [facility]  06/12/25  18:10 EDT

## 2025-06-12 NOTE — SIGNIFICANT NOTE
06/12/25 1451   Post Acute Pre-Cert Documentation   Request Submitted by Facility - Type: Post Acute   Post-Acute Authorization Type Submitted: IRF   Date Post Acute Pre-Cert Completed 06/12/25   Response Received from Insurance? Denial   Action Taken by Requesting Facility: P2P Completed

## 2025-06-12 NOTE — PLAN OF CARE
Goal Outcome Evaluation:           Progress: no change  Outcome Evaluation: prn pain med given per orders for c/o pain. pt placed on 1L n/c due to desatting to 80s while sleeping on r/a. no other changes to note at this time; poc ongoing

## 2025-06-12 NOTE — THERAPY TREATMENT NOTE
Acute Care - Physical Therapy Treatment Note   [facility]     Patient Name: Tiara Stubbs  : 1945  MRN: [id number]  Today's Date: 2025      Visit Dx:     ICD-10-CM ICD-9-CM   1. Shoulder fracture, right, closed, initial encounter  S42.91XA 812.00   2. Closed fracture of right foot, initial encounter  S92.901A 825.20   3. Acute right ankle pain  M25.571 719.47     338.19   4. Post-operative state  Z98.890 V45.89     Patient Active Problem List   Diagnosis    Overweight (BMI 25.0-29.9)    Immunization due    Chronic cough    SOB (shortness of breath)    Shoulder fracture, right, closed, initial encounter    Closed fracture of bone of right foot    Acute right ankle pain     Past Medical History:   Diagnosis Date    Acid reflux     Cancer     skin    Fibromyalgia     High cholesterol     Hx: recurrent pneumonia     Hypertension     Lupus     Osteoarthritis     Rheumatoid arthritis     Shoulder injury      Past Surgical History:   Procedure Laterality Date    CARPAL TUNNEL RELEASE Bilateral     KNEE SURGERY Right     TOTAL WRIST ARTHROPLASTY Right      PT Assessment (Last 12 Hours)       PT Evaluation and Treatment       Row Name 25 1137          Physical Therapy Time and Intention    Subjective Information no complaints  -KM     Document Type therapy note (daily note)  -KM     Mode of Treatment physical therapy  -KM     Patient Effort good  -KM     Symptoms Noted During/After Treatment fatigue  -KM       Row Name 25 1137          General Information    Patient Profile Reviewed yes  -KM     Patient Observations alert;cooperative;agree to therapy  -KM     Existing Precautions/Restrictions fall  -KM       Row Name 25 1137          Pain Scale: FACES Pre/Post-Treatment    Pain: FACES Scale, Pretreatment 2-->hurts little bit  -KM     Posttreatment Pain Rating 2-->hurts little bit  -KM       Row Name 25 1137          Cognition    Affect/Mental Status (Cognition) --  pleasantly confused  -KM      Orientation Status (Cognition) oriented to;person;verbal cues/prompts needed for orientation;situation  -KM     Follows Commands (Cognition) follows one-step commands  -KM       Row Name 06/12/25 1137          Bed Mobility    Bed Mobility supine-sit  -KM     Supine-Sit [place] (Bed Mobility) moderate assist (50% patient effort);2 person assist  -KM     Assistive Device (Bed Mobility) bed rails;head of bed elevated  -KM       Row Name 06/12/25 1137          Transfers    Transfers sit-stand transfer;stand-sit transfer;bed-chair transfer  -KM     Comment, (Transfers) HHA  -KM       Row Name 06/12/25 1137          Bed-Chair Transfer    Bed-Chair [place] (Transfers) minimum assist (75% patient effort);2 person assist  -KM       Row Name 06/12/25 1137          Sit-Stand Transfer    Sit-Stand [place] (Transfers) minimum assist (75% patient effort);2 person assist  -KM       Row Name 06/12/25 1137          Stand-Sit Transfer    Stand-Sit [place] (Transfers) minimum assist (75% patient effort);2 person assist  -KM       Row Name 06/12/25 1137          Gait/Stairs (Locomotion)    Gait/Stairs Locomotion gait/ambulation independence;gait/ambulation assistive device;distance ambulated  -KM     [place] Level (Gait) minimum assist (75% patient effort)  -KM     Assistive Device (Gait) --  [facility]  -     Patient was able to Ambulate yes  -KM     Distance in Feet (Gait) 10  -KM     Pattern (Gait) step-to  -KM     Deviations/Abnormal Patterns (Gait) gait speed decreased  -KM     Right Sided Gait Deviations weight shift ability decreased  -KM     Comment, (Gait/Stairs) RLE boot donned for out of bed activity  -       Row Name 06/12/25 1137          Safety Issues/Impairments Affecting Functional Mobility    Impairments Affecting Function (Mobility) balance;cognition;endurance/activity tolerance;pain;range of motion (ROM);strength  -KM       Row Name 06/12/25 1137          Motor Skills    Comments,  Therapeutic Exercise seated ther-ex  -KM     Additional Documentation Comments, Therapeutic Exercise (Row)  -KM       Row Name             Wound 06/09/25 1511 Right shoulder Surgical Open Surgical Incision    Wound - Properties Group Placement Date: 06/09/25  -CE Placement Time: 1511 -CE Side: Right  -CE Location: shoulder  -CE Primary Wound Type: Surgical  -CE Secondary Wound Type - Surgical: Open Surg  -CE    Retired Wound - Properties Group Placement Date: 06/09/25  -CE Placement Time: 1511 -CE Side: Right  -CE Location: shoulder  -CE    Retired Wound - Properties Group Placement Date: 06/09/25  -CE Placement Time: 1511  -CE Side: Right  -CE Location: shoulder  -CE    Retired Wound - Properties Group Date first assessed: 06/09/25  -CE Time first assessed: 1511 -CE Side: Right  -CE Location: shoulder  -CE      Row Name 06/12/25 1137          Progress Summary (PT)    Daily Progress Summary (PT) Pt. was able to demonstrate functional mobility skills w/ Lachelle-modA x2. She was able to improve ambulation quality and distance. She continues to demonstrate improved activity tolerance. Pt. would most benefit from inpatient rehab to improve mobility, strength, safety awareness, and decrease fall risk.  -KM               User Key  (r) = Recorded By, (t) = Taken By, (c) = Cosigned By      Initials Name Provider Type    CE Julián Seaman, RN Registered Nurse    Talat Vaughn, SHERRI Physical Therapist                    Physical Therapy Education       Title: PT OT SLP Therapies (Done)       Topic: Physical Therapy (Done)       Point: Mobility training (Done)       Learning Progress Summary            Patient Acceptance, E,TB, VU by KM at 6/10/2025 1455                      Point: Home exercise program (Done)       Learning Progress Summary            Patient Acceptance, E,TB, VU by KM at 6/10/2025 1455                      Point: Body mechanics (Done)       Learning Progress Summary            Patient Acceptance, E,TB, VU  by ROSALBA at 6/10/2025 1451                      Point: Precautions (Done)       Learning Progress Summary            Patient Acceptance, E,TB, VU by ROSALBA at 6/10/2025 1455                                      User Key       Initials Effective Dates Name Provider Type Discipline     05/24/22 -  Talat Trujillo, PT Physical Therapist PT                  PT Recommendation and Plan  Anticipated Discharge Disposition (PT): inpatient rehabilitation facility  Planned Therapy Interventions (PT): balance training, bed mobility training, gait training, home exercise program, patient/family education, postural re-education, ROM (range of motion), strengthening, stretching, transfer training  Therapy Frequency (PT): 5 times/wk (5x/wk)  Progress Summary (PT)  Daily Progress Summary (PT): Pt. was able to demonstrate functional mobility skills w/ Lachelle-modA x2. She was able to improve ambulation quality and distance. She continues to demonstrate improved activity tolerance. Pt. would most benefit from inpatient rehab to improve mobility, strength, safety awareness, and decrease fall risk.  Plan of Care Reviewed With: patient  Outcome Evaluation: Pt. evaluation completed during PT session. She was able to perform functional mobility skills w/ Lachelle-modA x2. She was able to transfer but unable to ambulate at time of evaluation. Pt. would benefit from increased PT services at this time.       Time Calculation:    PT Charges       Row Name 06/12/25 1137             Time Calculation    PT Received On 06/12/25  -KM         Time Calculation- PT    Total Timed Code Minutes- PT 30 minute(s)  -KM                User Key  (r) = Recorded By, (t) = Taken By, (c) = Cosigned By      Initials Name Provider Type    Talat Vaughn PT Physical Therapist                  Therapy Charges for Today       Code Description Service Date Service Provider Modifiers Qty    27064694534  PT THERAPEUTIC ACT EA 15 MIN 6/11/2025 Talat Trujillo, PT GP 1     48562377933 HC GAIT TRAINING EA 15 MIN 6/11/2025 Talat Trujillo, PT GP 1    54969132986 HC PT THERAPEUTIC ACT EA 15 MIN 6/12/2025 Talat Trujillo, PT GP 1    89087100765 HC GAIT TRAINING EA 15 MIN 6/12/2025 Talat Trujillo, PT GP 1            PT G-Codes  AM-PAC 6 Clicks Score (PT): 17    Talat Trujillo, PT  6/12/2025

## 2025-06-12 NOTE — THERAPY TREATMENT NOTE
Acute Care - Occupational Therapy Treatment Note   [facility]     Patient Name: Tiara Stubbs  : 1945  MRN: [id number]  Today's Date: 2025             Admit Date: 2025       ICD-10-CM ICD-9-CM   1. Shoulder fracture, right, closed, initial encounter  S42.91XA 812.00   2. Closed fracture of right foot, initial encounter  S92.901A 825.20   3. Acute right ankle pain  M25.571 719.47     338.19   4. Post-operative state  Z98.890 V45.89     Patient Active Problem List   Diagnosis    Overweight (BMI 25.0-29.9)    Immunization due    Chronic cough    SOB (shortness of breath)    Shoulder fracture, right, closed, initial encounter    Closed fracture of bone of right foot    Acute right ankle pain     Past Medical History:   Diagnosis Date    Acid reflux     Cancer     skin    Fibromyalgia     High cholesterol     Hx: recurrent pneumonia     Hypertension     Lupus     Osteoarthritis     Rheumatoid arthritis     Shoulder injury      Past Surgical History:   Procedure Laterality Date    CARPAL TUNNEL RELEASE Bilateral     KNEE SURGERY Right     TOTAL WRIST ARTHROPLASTY Right          OT ASSESSMENT FLOWSHEET (Last 12 Hours)       OT Evaluation and Treatment       Row Name 25 1434                   OT Time and Intention    Subjective Information complains of;weakness;fatigue;pain  -LM        Document Type therapy note (daily note)  -LM        Mode of Treatment occupational therapy  -LM        Patient Effort good  -LM        Comment Patient seen this date for adl retraining/fxl mobility.  Patient currently nwb rue with sling in place.  Min/mod assist with fxl transfer to recliner.  Max assist with bathing, dressing, toileting tasks.  setup with feeding.  mildly confused with min/mod cues required.  -LM           General Information    Existing Precautions/Restrictions fall;shoulder;brace on at all times  sling  -LM           Pain Assessment    Pretreatment Pain Rating 5/10  -LM        Posttreatment Pain  Rating 5/10  -LM        Pain Location shoulder  -LM        Pain Side/Orientation right  -LM           Cognition    Affect/Mental Status (Cognition) confused  -LM        Orientation Status (Cognition) oriented to;person;verbal cues/prompts needed for orientation  -LM           Wound 06/09/25 1511 Right shoulder Surgical Open Surgical Incision    Wound - Properties Group Placement Date: 06/09/25  -CE Placement Time: 1511 -CE Side: Right  -CE Location: shoulder  -CE Primary Wound Type: Surgical  -CE Secondary Wound Type - Surgical: Open Surg  -CE    Retired Wound - Properties Group Placement Date: 06/09/25  -CE Placement Time: 1511 -CE Side: Right  -CE Location: shoulder  -CE    Retired Wound - Properties Group Placement Date: 06/09/25 -CE Placement Time: 1511 -CE Side: Right  -CE Location: shoulder  -CE    Retired Wound - Properties Group Date first assessed: 06/09/25 -CE Time first assessed: 1511 -CE Side: Right  -CE Location: shoulder  -CE       Positioning and Restraints    Post Treatment Position chair  -LM        In Chair call light within reach;encouraged to call for assist;with brace  -LM                  User Key  (r) = Recorded By, (t) = Taken By, (c) = Cosigned By      Initials Name Effective Dates    CE Julián Seaman RN 06/16/21 -     Tata Mcmillan OT 06/16/21 -                            OT Recommendation and Plan              Time Calculation:     Therapy Charges for Today       Code Description Service Date Service Provider Modifiers Qty    40840267367  OT SELF CARE/MGMT/TRAIN EA 15 MIN 6/12/2025 Tata Ruiz OT GO 2                 Tata Ruiz OT  6/12/2025

## 2025-06-12 NOTE — PLAN OF CARE
Goal Outcome Evaluation:           Progress: no change  Pt resting in bed, VSS on room air. Pt worked with PT this shift. Pt c/o pain, PRN medication given per MAR. Pierce Catheter removed per order. Pt voices no concerns or complaints at this time, will continue with POC.

## 2025-06-12 NOTE — DISCHARGE PLACEMENT REQUEST
"Tiara Stubbs (80 y.o. Female)       Date of Birth   1945    Social Security Number       Address   [address]  [facility] [address]    Home Phone   [phone]    MRN   [id number]       [facility]    Marital Status                               Admission Date   6/7/2025    Admission Type   Urgent    Admitting Provider   Simone Coelho MD    Attending Provider   Simone Coelho MD    Department, Room/Bed   [address], 3337/1P       Discharge Date       Discharge Disposition       Discharge Destination                                 Attending Provider: Simone Coelho MD    Allergies: Codeine, Stadol [Butorphanol], Vistaril [Hydroxyzine Hcl]    Isolation: Droplet   Infection: Rhinovirus  (06/07/25)   Code Status: CPR    Ht: 170.2 cm (67.01\")   Wt: 87.4 kg (192 lb 9.6 oz)    Admission Cmt: None   Principal Problem: Shoulder fracture, right, closed, initial encounter [S42.91XA]                   Active Insurance as of 6/7/2025       Primary Coverage       Payor Plan Insurance Group Employer/Plan Group    HU[employer] MEDICARE REPLACEMENT HU[employer] MEDICARE ADVANTAGE PPO [id number]       Payor Plan Address Payor Plan Phone Number Payor Plan Fax Number Effective Dates    [address] [address] [phone]  1/1/2025 - None Entered    Formerly [facility] [id number]         Subscriber Name Subscriber Birth Date Member ID       TIARA STUBBS 1945 [id number]                     Emergency Contacts        (Rel.) Home Phone Work Phone Mobile Phone    Keya Quinonez (Daughter) [phone] -- --                 Physician Progress Notes (most recent note)        Alejandra Baron APRN at 06/12/25 1308       Summary:Postoperative day #3                   Interval History:     Tiara is a an 80-year-old female who is postoperative day #3 after undergoing a reverse right total shoulder arthroplasty due to fracture.  She is currently sitting up in the recliner at bedside with her " abductor sling in place and reports that she is experiencing fairly mild to moderate pain.  She states that the right foot is not giving her any complaints at this time.  It was noted by nursing staff that the patient did have a slight drop in her O2 saturation and was placed on 1 L nasal cannula while sleeping.  Outside of this no other acute events were noted at night. Patient currently denies any acute distress, chest pain, or shortness of air.        Objective     Vital Signs  Temp:  [97.5 °F (36.4 °C)-98.5 °F (36.9 °C)] 97.7 °F (36.5 °C)  Heart Rate:  [104-106] 104  Resp:  [16-20] 16  BP: (125-172)/(59-70) 129/59    Labs & Rads  Lab Results (last 72 hours)       Procedure Component Value Units Date/Time    Basic Metabolic Panel [[id number]]  (Abnormal) Collected: 06/12/25 0201    Specimen: Blood Updated: 06/12/25 0245     Glucose 84 mg/dL      BUN 21.1 mg/dL      Creatinine 0.97 mg/dL      Sodium 143 mmol/L      Potassium 4.4 mmol/L      Chloride 115 mmol/L      CO2 19.6 mmol/L      Calcium 7.8 mg/dL      BUN/Creatinine Ratio 21.8     Anion Gap 8.4 mmol/L      eGFR 59.2 mL/min/1.73     Narrative:      GFR Categories in Chronic Kidney Disease (CKD)              GFR Category          GFR (mL/min/1.73)    Interpretation  G1                    90 or greater        Normal or high (1)  G2                    60-89                Mild decrease (1)  G3a                   45-59                Mild to moderate decrease  G3b                   30-44                Moderate to severe decrease  G4                    15-29                Severe decrease  G5                    14 or less           Kidney failure    (1)In the absence of evidence of kidney disease, neither GFR category G1 or G2 fulfill the criteria for CKD.    eGFR calculation 2021 CKD-EPI creatinine equation, which does not include race as a factor    CBC & Differential [[id number]]  (Abnormal) Collected: 06/12/25 0201    Specimen: Blood Updated: 06/12/25 0215     Narrative:      The following orders were created for panel order CBC & Differential.  Procedure                               Abnormality         Status                     ---------                               -----------         ------                     CBC Auto Differential[[id number]]        Abnormal            Final result               Scan Slide[[id number]]                                                                    Please view results for these tests on the individual orders.    CBC Auto Differential [[id number]]  (Abnormal) Collected: 06/12/25 0201    Specimen: Blood Updated: 06/12/25 0215     WBC 11.22 10*3/mm3      RBC 2.54 10*6/mm3      Hemoglobin 8.2 g/dL      Hematocrit 27.1 %      .7 fL      MCH 32.3 pg      MCHC 30.3 g/dL      RDW 13.9 %      RDW-SD 54.4 fl      MPV 10.9 fL      Platelets 193 10*3/mm3      Neutrophil % 63.7 %      Lymphocyte % 23.6 %      Monocyte % 8.5 %      Eosinophil % 2.5 %      Basophil % 0.5 %      Immature Grans % 1.2 %      Neutrophils, Absolute 7.15 10*3/mm3      Lymphocytes, Absolute 2.65 10*3/mm3      Monocytes, Absolute 0.95 10*3/mm3      Eosinophils, Absolute 0.28 10*3/mm3      Basophils, Absolute 0.06 10*3/mm3      Immature Grans, Absolute 0.13 10*3/mm3      nRBC 0.0 /100 WBC     Urine Culture - Urine, Urine, Catheter [[id number]]  (Normal) Collected: 06/10/25 1448    Specimen: Urine, Catheter Updated: 06/11/25 2044     Urine Culture No growth    Comprehensive Metabolic Panel [[id number]]  (Abnormal) Collected: 06/11/25 0833    Specimen: Blood Updated: 06/11/25 0905     Glucose 99 mg/dL      BUN 28.9 mg/dL      Creatinine 1.17 mg/dL      Sodium 142 mmol/L      Potassium 3.9 mmol/L      Comment: Specimen hemolyzed.  Result may be falsely elevated.        Chloride 112 mmol/L      CO2 20.5 mmol/L      Calcium 8.1 mg/dL      Total Protein 5.9 g/dL      Albumin 2.9 g/dL      ALT (SGPT) 14 U/L      AST (SGOT) 55 U/L      Alkaline Phosphatase 54 U/L      Total  Bilirubin 0.4 mg/dL      Globulin 3.0 gm/dL      A/G Ratio 1.0 g/dL      BUN/Creatinine Ratio 24.7     Anion Gap 9.5 mmol/L      eGFR 47.3 mL/min/1.73     Narrative:      GFR Categories in Chronic Kidney Disease (CKD)              GFR Category          GFR (mL/min/1.73)    Interpretation  G1                    90 or greater        Normal or high (1)  G2                    60-89                Mild decrease (1)  G3a                   45-59                Mild to moderate decrease  G3b                   30-44                Moderate to severe decrease  G4                    15-29                Severe decrease  G5                    14 or less           Kidney failure    (1)In the absence of evidence of kidney disease, neither GFR category G1 or G2 fulfill the criteria for CKD.    eGFR calculation 2021 CKD-EPI creatinine equation, which does not include race as a factor    CBC & Differential [[id number]]  (Abnormal) Collected: 06/11/25 0833    Specimen: Blood Updated: 06/11/25 0851    Narrative:      The following orders were created for panel order CBC & Differential.  Procedure                               Abnormality         Status                     ---------                               -----------         ------                     CBC Auto Differential[[id number]]        Abnormal            Final result               Scan Slide[[id number]]                                                                    Please view results for these tests on the individual orders.    CBC Auto Differential [[id number]]  (Abnormal) Collected: 06/11/25 0833    Specimen: Blood Updated: 06/11/25 0850     WBC 14.23 10*3/mm3      RBC 2.87 10*6/mm3      Hemoglobin 9.3 g/dL      Hematocrit 31.1 %      .4 fL      MCH 32.4 pg      MCHC 29.9 g/dL      RDW 13.8 %      RDW-SD 55.7 fl      MPV 11.4 fL      Platelets 194 10*3/mm3      Neutrophil % 69.4 %      Lymphocyte % 20.1 %      Monocyte % 8.0 %      Eosinophil % 1.5 %       Basophil % 0.4 %      Immature Grans % 0.6 %      Neutrophils, Absolute 9.86 10*3/mm3      Lymphocytes, Absolute 2.86 10*3/mm3      Monocytes, Absolute 1.14 10*3/mm3      Eosinophils, Absolute 0.22 10*3/mm3      Basophils, Absolute 0.06 10*3/mm3      Immature Grans, Absolute 0.09 10*3/mm3      nRBC 0.0 /100 WBC     Urinalysis, Microscopic Only - Urine, Catheter [[id number]]  (Abnormal) Collected: 06/10/25 1448    Specimen: Urine, Catheter Updated: 06/10/25 1503     RBC, UA Too Numerous to Count /HPF      WBC, UA 21-50 /HPF      Bacteria, UA None Seen /HPF      Squamous Epithelial Cells, UA 3-6 /HPF      Hyaline Casts, UA 3-6 /LPF      Methodology Automated Microscopy    Urinalysis With Culture If Indicated - Urine, Catheter [[id number]]  (Abnormal) Collected: 06/10/25 1448    Specimen: Urine, Catheter Updated: 06/10/25 1503     Color, UA Yellow     Appearance, UA Clear     pH, UA 6.0     Specific Gravity, UA 1.019     Glucose, UA Negative     Ketones, UA Negative     Bilirubin, UA Negative     Blood, UA Moderate (2+)     Protein, UA Trace     Leuk Esterase, UA Moderate (2+)     Nitrite, UA Negative     Urobilinogen, UA 1.0 E.U./dL    Narrative:      In absence of clinical symptoms, the presence of pyuria, bacteria, and/or nitrites on the urinalysis result does not correlate with infection.    Hepatitis Panel, Acute [[id number]]  (Normal) Collected: 06/10/25 0259    Specimen: Blood Updated: 06/10/25 0359     Hepatitis B Surface Ag Non-Reactive     Hep A IgM Non-Reactive     Hep B C IgM Non-Reactive     Hepatitis C Ab Non-Reactive    Narrative:      Results may be falsely decreased if patient taking Biotin.     Comprehensive Metabolic Panel [[id number]]  (Abnormal) Collected: 06/10/25 0259    Specimen: Blood Updated: 06/10/25 0354     Glucose 121 mg/dL      BUN 24.9 mg/dL      Creatinine 1.27 mg/dL      Sodium 137 mmol/L      Potassium 4.0 mmol/L      Chloride 107 mmol/L      CO2 18.3 mmol/L      Calcium 8.0 mg/dL       Total Protein 5.9 g/dL      Albumin 3.1 g/dL      ALT (SGPT) 18 U/L      AST (SGOT) 42 U/L      Alkaline Phosphatase 46 U/L      Total Bilirubin 0.4 mg/dL      Globulin 2.8 gm/dL      A/G Ratio 1.1 g/dL      BUN/Creatinine Ratio 19.6     Anion Gap 11.7 mmol/L      eGFR 42.8 mL/min/1.73     Narrative:      GFR Categories in Chronic Kidney Disease (CKD)              GFR Category          GFR (mL/min/1.73)    Interpretation  G1                    90 or greater        Normal or high (1)  G2                    60-89                Mild decrease (1)  G3a                   45-59                Mild to moderate decrease  G3b                   30-44                Moderate to severe decrease  G4                    15-29                Severe decrease  G5                    14 or less           Kidney failure    (1)In the absence of evidence of kidney disease, neither GFR category G1 or G2 fulfill the criteria for CKD.    eGFR calculation 2021 CKD-EPI creatinine equation, which does not include race as a factor    CBC & Differential [[id number]]  (Abnormal) Collected: 06/10/25 0259    Specimen: Blood Updated: 06/10/25 0337    Narrative:      The following orders were created for panel order CBC & Differential.  Procedure                               Abnormality         Status                     ---------                               -----------         ------                     CBC Auto Differential[[id number]]        Abnormal            Final result                 Please view results for these tests on the individual orders.    CBC Auto Differential [[id number]]  (Abnormal) Collected: 06/10/25 0259    Specimen: Blood Updated: 06/10/25 0337     WBC 20.69 10*3/mm3      RBC 3.09 10*6/mm3      Hemoglobin 10.0 g/dL      Hematocrit 31.9 %      .2 fL      MCH 32.4 pg      MCHC 31.3 g/dL      RDW 13.8 %      RDW-SD 51.9 fl      MPV 11.1 fL      Platelets 162 10*3/mm3      Neutrophil % 77.3 %      Lymphocyte % 9.8 %       Monocyte % 12.1 %      Eosinophil % 0.0 %      Basophil % 0.2 %      Immature Grans % 0.6 %      Neutrophils, Absolute 16.00 10*3/mm3      Lymphocytes, Absolute 2.02 10*3/mm3      Monocytes, Absolute 2.50 10*3/mm3      Eosinophils, Absolute 0.00 10*3/mm3      Basophils, Absolute 0.04 10*3/mm3      Immature Grans, Absolute 0.13 10*3/mm3      nRBC 0.0 /100 WBC           Imaging Results (Last 72 Hours)       Procedure Component Value Units Date/Time    XR Chest 1 View [[id number]] Collected: 06/10/25 1503     Updated: 06/10/25 1509    Narrative:      EXAM:    XR Chest, 1 View     EXAM DATE:    6/10/2025 3:03 PM     CLINICAL HISTORY:    SIRS; S42.91XA-Fracture of right shoulder girdle, part unspecified,  initial encounter for closed fracture; S92.901A-Unspecified fracture of  right foot, initial encounter for closed fracture; M25.571-Pain in right  ankle and joints of right foot; Z98.890-Other specified postprocedural  states     TECHNIQUE:    Frontal view of the chest.     COMPARISON:    6/7/2025     FINDINGS:    Lungs and pleural spaces:  Right lower lobe airspace disease.  No  consolidation.  No pneumothorax.    Heart:  Unremarkable as visualized.  No cardiomegaly.    Mediastinum:  Unremarkable as visualized.  Normal mediastinal contour.    Bones/joints:  Unremarkable as visualized.  No acute fracture.       Impression:        Right lower lobe airspace disease.     This report was finalized on 6/10/2025 3:07 PM by Dr. Amie Haywood MD.       XR Shoulder 2+ View Right [[id number]] Collected: 06/09/25 1636     Updated: 06/09/25 1639    Narrative:      EXAM:    XR Right Shoulder Complete, 2 or More Views     EXAM DATE:    6/9/2025 4:36 PM     CLINICAL HISTORY:    Post-Op 1 view AP only; S42.91XA-Fracture of right shoulder girdle,  part unspecified, initial encounter for closed fracture;  S92.901A-Unspecified fracture of right foot, initial encounter for  closed fracture; M25.571-Pain in right ankle and joints of right  foot;  Z98.890-Other specified postprocedural states     TECHNIQUE:    Two or more views of the right shoulder.     COMPARISON:    No relevant prior studies available.     FINDINGS:    Bones/joints:  Right total shoulder arthroplasty.  Components appear  well seated.  No acute fracture.  No dislocation.    Soft tissues:  Unremarkable as visualized.       Impression:        No acute findings in the right shoulder.     This report was finalized on 6/9/2025 4:37 PM by Dr. Aime Haywood MD.                   Physical Exam:  Constitutional: Patient is alert and oriented to person and place.  Patient is confused as to the date but is able to be reoriented.  No acute distress noted.    Musculoskeletal: Upon examination of the right shoulder there is an abductor sling noted to the right upper extremity.  There is a bulky surgical dressing noted to the anterior shoulder.  No evidence of edema, ecchymosis, erythema, active drainage, or signs of an infectious process.  Patient sensation is intact to light touch with brisk capillary refill.  Patient is able to flex and extend all digits of the right hand without complication.  There is a 2/4 palpable pulse radially.    Upon examination of the right foot there is fading ecchymosis noted laterally.  No evidence of edema, erythema, wounds, drainage, or signs of an infectious process.  Patient sensation is intact to light touch with brisk capillary refill.  Patient is able to flex and extend all digits of the right foot as well as plantar and dorsiflex at the ankle.  There is a palpable pulse distally.  CAM boot reapplied.         Assessment:  S/p reverse right total shoulder arthroplasty  Right 5th metatarsal fracture        Plan:  Continue with pain control.  Activity as tolerated with pain as the guide.     PT/OT: Patient is to remain in the sling to the right upper extremity.  Patient should refrain from any heavy lifting, pushing, pulling of the right upper extremity.      Dressing: Maintain the surgical dressing at this time.      CAM boot to the right lower extremity and weight bear as tolerated.      DVT/PPx: Patient is currently on  heparin 5000 units subcutaneous 3 times daily     Patient is to follow-up in the orthopedic office in 2 weeks with Dr. Carlin for repeat imaging and reevaluation of the right shoulder. Patient may return sooner for any worsening concerns and or complaints.    For any questions please feel free to reach out or notify orthopedics.        Discharge planning to be determined per hospitalist.        DIEGO Kent  25  13:09 EDT          Electronically signed by Alejandra Baron APRN at 25 1315          Consult Notes (most recent note)        Henrry Vincent APRN at 25 1024        Consult Orders    1. Inpatient Orthopedic Surgery Consult [[id number]] ordered by Amanda Pineda DO              Attestation signed by Ameya Carlin MD at 25 1023      I have reviewed this documentation and agree.                   Summary:Right proximal humerus fracture with shoulder dislocation and right foot fracture, DOI: 2025.                   Inpatient Orthopedic Surgery Consult  Consult performed by: Henrry Vincent APRN  Consult ordered by: Amanda Pineda DO          Patient Identification:  Name:  Tiara Stubbs  Age:  80 y.o.  Sex:  female  :  1945  MRN:  [id number]  Visit Number:  [id number]  Primary care provider:  Paula Downey APRN    History of presenting illness: This is an 80-year-old female who was transferred to this facility from an outside hospital with a right proximal humerus fracture with shoulder dislocation and right foot fracture, DOI: 2025.  The patient notes that she fell when getting up from the toilet, injuring the shoulder and foot.  Imaging at the outside facility showed a proximal humerus fracture with shoulder dislocation and a right foot fracture.  She was placed in a  short leg splint to the right foot and a sling to the right upper extremity.  She was transferred to this facility for management of the fractures.  Today she notes her pain is fairly controlled with her current pain medication.  No paresthesias.  She lives at home with her daughter and son-in-law.  She is ambulatory without the use of an assist device for mobility.  She is not employed.  She denies prior injury or surgery to the right shoulder.  She takes aspirin 81 mg daily.  She is right-hand dominant.    ---------------------------------------------------------------------------------------------------------------------    Review of Systems   Constitutional:  Positive for activity change.   HENT: Negative.     Respiratory: Negative.     Cardiovascular: Negative.    Gastrointestinal: Negative.    Endocrine: Negative.    Genitourinary: Negative.    Musculoskeletal:  Positive for arthralgias and joint swelling.        Right shoulder and right foot pain.   Skin: Negative.    Neurological: Negative.       ---------------------------------------------------------------------------------------------------------------------   Past History:  Family History   Problem Relation Age of Onset    Heart disease Mother     Liver cancer Mother     Heart disease Father     Breast cancer Neg Hx      Past Medical History:   Diagnosis Date    Acid reflux     Fibromyalgia     High cholesterol     Hx: recurrent pneumonia     Hypertension     Lupus     Osteoarthritis     Rheumatoid arthritis      Past Surgical History:   Procedure Laterality Date    CARPAL TUNNEL RELEASE Bilateral     KNEE SURGERY Right     TOTAL WRIST ARTHROPLASTY Right      Social History     Socioeconomic History    Marital status:    Tobacco Use    Smoking status: Never     Passive exposure: Never    Smokeless tobacco: Never   Vaping Use    Vaping status: Never Used   Substance and Sexual Activity    Alcohol use: Never    Drug use: Never    Sexual activity:  Defer     ---------------------------------------------------------------------------------------------------------------------   Allergies:  Codeine, Stadol [butorphanol], and Vistaril [hydroxyzine hcl]  ---------------------------------------------------------------------------------------------------------------------   Prior to Admission Medications       Prescriptions Last Dose Informant Patient Reported? Taking?    albuterol (ACCUNEB) 1.25 MG/3ML nebulizer solution Past Week Child, Other Yes Yes    Take 3 mL by nebulization 3 (Three) Times a Day As Needed for Wheezing or Shortness of Air.    aspirin 81 MG EC tablet 6/6/2025  Yes Yes    Take 1 tablet by mouth Daily.    Cholecalciferol 50 MCG (2000 UT) tablet 6/6/2025 Child, Other Yes Yes    Take 1 tablet by mouth Daily.    cyclobenzaprine (FLEXERIL) 10 MG tablet 6/6/2025 Child, Other Yes Yes    Take 1 tablet by mouth Every Night.    famotidine (PEPCID) 40 MG tablet 6/6/2025 Child, Other Yes Yes    Take 1 tablet by mouth Daily.    folic acid (FOLVITE) 1 MG tablet 6/6/2025 Other, Child Yes Yes    Take 1 tablet by mouth Daily.    gabapentin (NEURONTIN) 400 MG capsule 6/6/2025 Child, Other Yes Yes    Take 1 capsule by mouth 3 (Three) Times a Day.    HYDROcodone-acetaminophen (NORCO)  MG per tablet 6/6/2025 Child, Other Yes Yes    Take 1 tablet by mouth 4 (Four) Times a Day.    metoprolol succinate XL (TOPROL-XL) 25 MG 24 hr tablet 6/6/2025 Child, Other Yes Yes    Take 1 tablet by mouth Daily.    olmesartan (BENICAR) 40 MG tablet 6/6/2025 Child, Other Yes Yes    Take 1 tablet by mouth Daily.    predniSONE (DELTASONE) 1 MG tablet 6/6/2025 Child, Other Yes Yes    Take 4 tablets by mouth Daily.    rosuvastatin (CRESTOR) 20 MG tablet 6/6/2025 Other, Child Yes Yes    Take 1 tablet by mouth Daily.    valACYclovir (VALTREX) 500 MG tablet 6/6/2025 Child, Other Yes Yes    Take 1 tablet by mouth Daily.          [facility] Meds:  cholecalciferol, 2,000 Units, Oral,  Daily  cyclobenzaprine, 10 mg, Oral, Nightly  famotidine, 40 mg, Oral, Daily  folic acid, 1,000 mcg, Oral, Daily  gabapentin, 400 mg, Oral, TID  HYDROcodone-acetaminophen, 1 tablet, Oral, 4x Daily  losartan, 100 mg, Oral, Q24H  metoprolol succinate XL, 25 mg, Oral, Daily  predniSONE, 4 mg, Oral, Daily  rosuvastatin, 20 mg, Oral, Daily  senna-docusate sodium, 2 tablet, Oral, BID  sodium chloride, 10 mL, Intravenous, Q12H  valACYclovir, 500 mg, Oral, Daily         ---------------------------------------------------------------------------------------------------------------------   Vital Signs:  Temp:  [97.8 °F (36.6 °C)-99.1 °F (37.3 °C)] 97.8 °F (36.6 °C)  Heart Rate:  [] 106  Resp:  [16-20] 20  BP: (144-158)/(65-77) 152/75      06/07/25  1005 06/08/25  0500   Weight: 90.7 kg (199 lb 15.3 oz) 88.1 kg (194 lb 3.6 oz)     Body mass index is 30.42 kg/m².  ---------------------------------------------------------------------------------------------------------------------     Physical exam:  Constitutional:  Well-developed and well-nourished.  No acute distress.      HENT:  Head: Normocephalic and atraumatic.  Mouth:  Moist mucous membranes.    Eyes:  Conjunctivae are normal.  Pupils are equal, round, and reactive to light.  No scleral icterus.  Neck:  Neck supple.  Trachea midline.  Cardiovascular:  Normal rate and regular rhythm.  Pulmonary/Chest:  No respiratory distress and good air movement.  Abdominal:  Soft.  No distension and no tenderness.   Musculoskeletal: Upon examination of the right shoulder, there is a fullness to the anterior shoulder, minimal ecchymosis, no erythema, no open wounds, generalized tenderness.  She is able to flex extend the elbow.  She is unable to flex or extend the wrist due to a prior fusion.  She is able to flex and extend the digits.  Capillary refill is brisk and light touch sensation is intact distally.  Radial pulse present.  Upon examination of the right foot, there is a  "short leg splint in place.  Splint is well-fitted.  No edema, erythema, ecchymosis, or edema to the surrounding tissues.  Tissues are soft.  She is able to flex and extend the digits.  Capillary refill is brisk and light touch sensation is intact distally.  Neurological:  Alert and oriented to person, place, and time.     Skin:  Skin is warm and dry.  No rash noted.  No ecchymosis or abrasion.   Psychiatric:  Normal mood and affect.  Behavior is normal.  Judgment and thought content normal.   Peripheral vascular:  No pitting edema and strong pulses on all 4 extremities.      ---------------------------------------------------------------------------------------------------------------------   .  ---------------------------------------------------------------------------------------------------------------------   Results from last 7 days   Lab Units 06/07/25  1120   WBC 10*3/mm3 11.17*   HEMOGLOBIN g/dL 12.3   HEMATOCRIT % 37.8   MCV fL 101.3*   MCHC g/dL 32.5   PLATELETS 10*3/mm3 158         Results from last 7 days   Lab Units 06/07/25  1120   SODIUM mmol/L 143   POTASSIUM mmol/L 3.9   CHLORIDE mmol/L 108*   CO2 mmol/L 22.9   BUN mg/dL 19.5   CREATININE mg/dL 0.83   CALCIUM mg/dL 8.7   GLUCOSE mg/dL 103*   ALBUMIN g/dL 3.8   BILIRUBIN mg/dL 0.6   ALK PHOS U/L 60   AST (SGOT) U/L 87*   ALT (SGPT) U/L 49*   Estimated Creatinine Clearance: 61.6 mL/min (by C-G formula based on SCr of 0.83 mg/dL).  No results found for: \"AMMONIA\"          No results found for: \"HGBA1C\"  No results found for: \"TSH\", \"FREET4\"  No results found for: \"PREGTESTUR\", \"PREGSERUM\", \"HCG\", \"HCGQUANT\"  Pain Management Panel           No data to display              No results found for: \"BLOODCX\"  No results found for: \"URINECX\"  No results found for: \"WOUNDCX\"  No results found for: \"STOOLCX\"      ---------------------------------------------------------------------------------------------------------------------   Imaging Results (Last 7 Days)  "      Procedure Component Value Units Date/Time    XR Chest 1 View - In process [[id number]] Resulted: 06/07/25 1158     Updated: 06/07/25 1213    This result has not been signed. Information might be incomplete.            ----------------------------------------------------------------------------------------------------------------------      Assessment:     Right proximal humerus fracture with shoulder dislocation, DOI: 6/6/2025.    Right foot fracture, DOI: 6/6/2025.      Plan:     This patient was discussed with Dr. Myles, the on-call surgeon.  He has reviewed her imaging from the outside facility and recommends a right reverse total shoulder arthroplasty to be done tomorrow.  The nature of this procedure, as well as, risks, benefits, alternatives and complications to the above operation were discussed with the patient. Risks include, but are not limited to, anesthesia complications, death, injury to nerves and vessels, infection, blood clots, continued pain and repeat or revision surgery. Following the discussion, the patient verbalized understanding of the risks and benefits to the above procedure and elected to proceed with surgery.  Surgical consent was obtained.  It was explained to the patient that the surgery will be done tomorrow as we have to arrange for equipment to be brought from [facility].    N.P.O. after midnight.    Case request placed and  made aware of need for equipment from [facility].    Nonweightbearing to the right upper and right lower extremities.    Maintain a sling to the right upper extremity.    Maintain the short leg splint to the right lower extremity.    We will obtain x-rays of the right shoulder and right foot and ankle, no imaging available in the system here.    Pain control.    Orthopedic surgery following.    Thank you for the consult.    DIEGO Moralez  06/08/25  10:24 EDT    Electronically signed by Ameya Carlin MD at  25 1023          Physical Therapy Notes (most recent note)        Talat Trujillo, PT at 25 1141  Version 1 of 1         Acute Care - Physical Therapy Treatment Note   [place]     Patient Name: Tiara Stubbs  : 1945  MRN: [id number]  Today's Date: 2025      Visit Dx:     ICD-10-CM ICD-9-CM   1. Shoulder fracture, right, closed, initial encounter  S42.91XA 812.00   2. Closed fracture of right foot, initial encounter  S92.901A 825.20   3. Acute right ankle pain  M25.571 719.47     338.19   4. Post-operative state  Z98.890 V45.89     Patient Active Problem List   Diagnosis    Overweight (BMI 25.0-29.9)    Immunization due    Chronic cough    SOB (shortness of breath)    Shoulder fracture, right, closed, initial encounter    Closed fracture of bone of right foot    Acute right ankle pain     Past Medical History:   Diagnosis Date    Acid reflux     Cancer     skin    Fibromyalgia     High cholesterol     Hx: recurrent pneumonia     Hypertension     Lupus     Osteoarthritis     Rheumatoid arthritis     Shoulder injury      Past Surgical History:   Procedure Laterality Date    CARPAL TUNNEL RELEASE Bilateral     KNEE SURGERY Right     TOTAL WRIST ARTHROPLASTY Right      PT Assessment (Last 12 Hours)       PT Evaluation and Treatment       Row Name 25 1137          Physical Therapy Time and Intention    Subjective Information no complaints  -KM     Document Type therapy note (daily note)  -KM     Mode of Treatment physical therapy  -KM     Patient Effort good  -KM     Symptoms Noted During/After Treatment fatigue  -KM       Row Name 25 1137          General Information    Patient Profile Reviewed yes  -KM     Patient Observations alert;cooperative;agree to therapy  -KM     Existing Precautions/Restrictions fall  -KM       Row Name 25 1137          Pain Scale: FACES Pre/Post-Treatment    Pain: FACES Scale, Pretreatment 2-->hurts little bit  -KM     Posttreatment Pain Rating  2-->hurts little bit  -KM       Row Name 06/12/25 1137          Cognition    Affect/Mental Status (Cognition) --  pleasantly confused  -KM     Orientation Status (Cognition) oriented to;person;verbal cues/prompts needed for orientation;situation  -KM     Follows Commands (Cognition) follows one-step commands  -KM       Row Name 06/12/25 1137          Bed Mobility    Bed Mobility supine-sit  -KM     Supine-Sit [place] (Bed Mobility) moderate assist (50% patient effort);2 person assist  -KM     Assistive Device (Bed Mobility) bed rails;head of bed elevated  -       Row Name 06/12/25 1137          Transfers    Transfers sit-stand transfer;stand-sit transfer;bed-chair transfer  -KM     Comment, (Transfers) HHA  -       Row Name 06/12/25 1137          Bed-Chair Transfer    Bed-Chair [place] (Transfers) minimum assist (75% patient effort);2 person assist  -KM       Row Name 06/12/25 1137          Sit-Stand Transfer    Sit-Stand [place] (Transfers) minimum assist (75% patient effort);2 person assist  -KM       Row Name 06/12/25 1137          Stand-Sit Transfer    Stand-Sit [place] (Transfers) minimum assist (75% patient effort);2 person assist  -KM       Row Name 06/12/25 1137          Gait/Stairs (Locomotion)    Gait/Stairs Locomotion gait/ambulation independence;gait/ambulation assistive device;distance ambulated  -KM     [place] Level (Gait) minimum assist (75% patient effort)  -KM     Assistive Device (Gait) --  A  -     Patient was able to Ambulate yes  -KM     Distance in Feet (Gait) 10  -KM     Pattern (Gait) step-to  -KM     Deviations/Abnormal Patterns (Gait) gait speed decreased  -KM     Right Sided Gait Deviations weight shift ability decreased  -KM     Comment, (Gait/Stairs) RLE boot donned for out of bed activity  -KM       Row Name 06/12/25 1137          Safety Issues/Impairments Affecting Functional Mobility    Impairments Affecting Function (Mobility)  balance;cognition;endurance/activity tolerance;pain;range of motion (ROM);strength  -KM       Row Name 06/12/25 1137          Motor Skills    Comments, Therapeutic Exercise seated ther-ex  -KM     Additional Documentation Comments, Therapeutic Exercise (Row)  -KM       Row Name             Wound 06/09/25 1511 Right shoulder Surgical Open Surgical Incision    Wound - Properties Group Placement Date: 06/09/25  -CE Placement Time: 1511  -CE Side: Right  -CE Location: shoulder  -CE Primary Wound Type: Surgical  -CE Secondary Wound Type - Surgical: Open Surg  -CE    Retired Wound - Properties Group Placement Date: 06/09/25  -CE Placement Time: 1511  -CE Side: Right  -CE Location: shoulder  -CE    Retired Wound - Properties Group Placement Date: 06/09/25  -CE Placement Time: 1511  -CE Side: Right  -CE Location: shoulder  -CE    Retired Wound - Properties Group Date first assessed: 06/09/25  -CE Time first assessed: 1511  -CE Side: Right  -CE Location: shoulder  -CE      Row Name 06/12/25 1137          Progress Summary (PT)    Daily Progress Summary (PT) Pt. was able to demonstrate functional mobility skills w/ Lachelle-modA x2. She was able to improve ambulation quality and distance. She continues to demonstrate improved activity tolerance. Pt. would most benefit from inpatient rehab to improve mobility, strength, safety awareness, and decrease fall risk.  -KM               User Key  (r) = Recorded By, (t) = Taken By, (c) = Cosigned By      Initials Name Provider Type    Julián Cunningham, RN Registered Nurse    Talat Vaughn, PT Physical Therapist                    Physical Therapy Education       Title: PT OT SLP Therapies (Done)       Topic: Physical Therapy (Done)       Point: Mobility training (Done)       Learning Progress Summary            Patient Acceptance, E,TB, VU by ROSALBA at 6/10/2025 1455                      Point: Home exercise program (Done)       Learning Progress Summary            Patient Acceptance,  E,TB, VU by  at 6/10/2025 1455                      Point: Body mechanics (Done)       Learning Progress Summary            Patient Acceptance, E,TB, VU by  at 6/10/2025 1455                      Point: Precautions (Done)       Learning Progress Summary            Patient Acceptance, E,TB, VU by  at 6/10/2025 1455                                      User Key       Initials Effective Dates Name Provider Type Discipline     05/24/22 -  Talat Trujillo, PT Physical Therapist PT                  PT Recommendation and Plan  Anticipated Discharge Disposition (PT): inpatient rehabilitation facility  Planned Therapy Interventions (PT): balance training, bed mobility training, gait training, home exercise program, patient/family education, postural re-education, ROM (range of motion), strengthening, stretching, transfer training  Therapy Frequency (PT): 5 times/wk (5x/wk)  Progress Summary (PT)  Daily Progress Summary (PT): Pt. was able to demonstrate functional mobility skills w/ [place]achelle-modA x2. She was able to improve ambulation quality and distance. She continues to demonstrate improved activity tolerance. Pt. would most benefit from inpatient rehab to improve mobility, strength, safety awareness, and decrease fall risk.  Plan of Care Reviewed With: patient  Outcome Evaluation: Pt. evaluation completed during PT session. She was able to perform functional mobility skills w/ [place]le-modA x2. She was able to transfer but unable to ambulate at time of evaluation. Pt. would benefit from increased PT services at this time.       Time Calculation:    PT Charges       Row Name 06/12/25 1137             Time Calculation    PT Received On 06/12/25  -         Time Calculation- PT    Total Timed Code Minutes- PT 30 minute(s)  -                User Key  (r) = Recorded By, (t) = Taken By, (c) = Cosigned By      Initials Name Provider Type    Talat Vaughn, SHERRI Physical Therapist                  Therapy Charges for Today        Code Description Service Date Service Provider Modifiers Qty    41031758475 HC PT THERAPEUTIC ACT EA 15 MIN 2025 Talat Trujillo, PT GP 1    25293849485 HC GAIT TRAINING EA 15 MIN 2025 Talat Trujillo, PT GP 1    55367303935 HC PT THERAPEUTIC ACT EA 15 MIN 2025 Talat Trujillo, PT GP 1    04994229956 HC GAIT TRAINING EA 15 MIN 2025 Talat Trujillo, PT GP 1            PT G-Codes  AM-PAC 6 Clicks Score (PT): 17    Talat Trujillo PT  2025      Electronically signed by Talat Trujillo, PT at 25 1142          Occupational Therapy Notes (most recent note)        Katy Malik, OT at 25 1418          Patient Name: Tiara Stubbs  : 1945    MRN: [id number]                              Today's Date: 2025       Admit Date: 2025    Visit Dx:     ICD-10-CM ICD-9-CM   1. Shoulder fracture, right, closed, initial encounter  S42.91XA 812.00   2. Closed fracture of right foot, initial encounter  S92.901A 825.20   3. Acute right ankle pain  M25.571 719.47     338.19   4. Post-operative state  Z98.890 V45.89     Patient Active Problem List   Diagnosis    Overweight (BMI 25.0-29.9)    Immunization due    Chronic cough    SOB (shortness of breath)    Shoulder fracture, right, closed, initial encounter    Closed fracture of bone of right foot    Acute right ankle pain     Past Medical History:   Diagnosis Date    Acid reflux     Cancer     skin    Fibromyalgia     High cholesterol     Hx: recurrent pneumonia     Hypertension     Lupus     Osteoarthritis     Rheumatoid arthritis     Shoulder injury      Past Surgical History:   Procedure Laterality Date    CARPAL TUNNEL RELEASE Bilateral     KNEE SURGERY Right     TOTAL WRIST ARTHROPLASTY Right       General Information       Row Name 25 1414          OT Time and Intention    Subjective Information no complaints  -KP     Document Type therapy note (daily note)  -KP     Mode of Treatment individual therapy;occupational therapy  -KP      Patient Effort good  -     Comment Patient agreeable to therapy. Pleasantly confused.  -       Row Name 06/11/25 1414          General Information    Patient Profile Reviewed yes  -     Existing Precautions/Restrictions fall  -     Barriers to Rehab medically complex;cognitive status  -[facility] Name 06/11/25 1414          Cognition    Orientation Status (Cognition) oriented to;person;verbal cues/prompts needed for orientation;situation  -[facility] Name 06/11/25 1414          Safety Issues/Impairments Affecting Functional Mobility    Safety Issues Affecting Function (Mobility) awareness of need for assistance;insight into deficits/self-awareness  -     Impairments Affecting Function (Mobility) balance;cognition;endurance/activity tolerance;pain;range of motion (ROM);strength  -     Cognitive Impairments, Mobility Safety/Performance insight into deficits/self-awareness;awareness, need for assistance  -               User Key  (r) = Recorded By, (t) = Taken By, (c) = Cosigned By      Initials Name Provider Type     Katy Malik, OT Occupational Therapist                     Mobility/ADL's       [facility] Name 06/11/25 1415          Bed Mobility    Bed Mobility supine-sit  -     Supine-Sit [place] (Bed Mobility) moderate assist (50% patient effort);2 person assist  -     Assistive Device (Bed Mobility) bed rails;head of bed elevated  -[facility] Name 06/11/25 1415          Transfers    Transfers sit-stand transfer;stand-sit transfer;bed-chair transfer  -     Comment, (Transfers) handheld assist  -[facility] Name 06/11/25 1415          Bed-Chair Transfer    Bed-Chair [place] (Transfers) minimum assist (75% patient effort);2 person assist  -[facility] Name 06/11/25 1415          Sit-Stand Transfer    Sit-Stand [place] (Transfers) minimum assist (75% patient effort);2 person assist  -[facility] Name 06/11/25 1415          Stand-Sit Transfer    Stand-Sit [place]  (Transfers) minimum assist (75% patient effort);2 person assist  -               User Key  (r) = Recorded By, (t) = Taken By, (c) = Cosigned By      Initials Name Provider Type    Katy Sahu OT Occupational Therapist                   Obj/Interventions       Row Name 06/11/25 1416          Motor Skills    Motor Skills functional endurance  -     Functional Endurance fair  -       Row Name 06/11/25 1416          Balance    Balance Assessment sitting static balance  -     Static Sitting Balance contact guard  -     Balance Interventions sitting;static;minimal challenge;occupation based/functional task  -               User Key  (r) = Recorded By, (t) = Taken By, (c) = Cosigned By      Initials Name Provider Type    Katy Sahu OT Occupational Therapist                   Goals/Plan    No documentation.                  Clinical Impression       Row Name 06/11/25 1416          Pain Scale: FACES Pre/Post-Treatment    Pain: FACES Scale, Pretreatment 2-->hurts little bit  -KP     Posttreatment Pain Rating 2-->hurts little bit  -       Row Name 06/11/25 1416          Plan of Care Review    Plan of Care Reviewed With patient  -     Progress improving  -     Outcome Evaluation Patient seen for OT treatment. Patient continues to be pleasantly confused, but receptive to reorientation. She had sling in place on RUE, and boot donned on RLE prior to transfer to chair. Continue plan of care. Patient would benefit from intensive therapy to promote return home, without therapy she is a risk for progressive decline and/or injury from current functional performance.  -       Row Name 06/11/25 1416          Therapy Assessment/Plan (OT)    Rehab Potential (OT) good  -       Row Name 06/11/25 1416          Therapy Plan Review/Discharge Plan (OT)    Anticipated Discharge Disposition (OT) inpatient rehabilitation facility;skilled nursing facility  -       Row Name 06/11/25 1416          Positioning and  Restraints    Pre-Treatment Position in bed  -     Post Treatment Position chair  -     In Chair sitting;call light within reach;encouraged to call for assist;exit alarm on;notified ns  -               User Key  (r) = Recorded By, (t) = Taken By, (c) = Cosigned By      Initials Name Provider Type    Katy Sahu OT Occupational Therapist                   Outcome Measures    No documentation.                     OT Recommendation and Plan  Planned Therapy Interventions (OT): activity tolerance training, BADL retraining, functional balance retraining, patient/caregiver education/training, passive ROM/stretching, occupation/activity based interventions, transfer/mobility retraining, strengthening exercise, ROM/therapeutic exercise  Therapy Frequency (OT): 5 times/wk  Plan of Care Review  Plan of Care Reviewed With: patient  Progress: improving  Outcome Evaluation: Patient seen for OT treatment. Patient continues to be pleasantly confused, but receptive to reorientation. She had sling in place on RUE, and boot donned on RLE prior to transfer to chair. Continue plan of care. Patient would benefit from intensive therapy to promote return home, without therapy she is a risk for progressive decline and/or injury from current functional performance.     Time Calculation:         Time Calculation- OT       Row Name 06/11/25 1418             Time Calculation- OT    OT Received On 06/11/25  -                User Key  (r) = Recorded By, (t) = Taken By, (c) = Cosigned By      Initials Name Provider Type    Katy Sahu OT Occupational Therapist                  Therapy Charges for Today       Code Description Service Date Service Provider Modifiers Qty    07284057741  OT EVAL MOD COMPLEXITY 4 6/10/2025 Katy Malik OT GO 1    [id number]  OT EVAL MOD COMPLEXITY 4 6/11/2025 Katy Malik OT GO 1                 Katy Malik OT  6/11/2025    Electronically signed by Katy Malik OT at 06/11/25  [id number]

## 2025-06-12 NOTE — DISCHARGE PLACEMENT REQUEST
"Tiara Stubbs (80 y.o. Female)       Date of Birth   1945    Social Security Number       Address   [address]  [place] [id number]    Home Phone   [phone]    MRN   [id number]       [facility]    Marital Status                               Admission Date   6/7/2025    Admission Type   Urgent    Admitting Provider   Simone Coelho MD    Attending Provider   Simone Coelho MD    Department, Room/Bed   [address], 3337/1P       Discharge Date       Discharge Disposition       Discharge Destination                                 Attending Provider: Simone Coelho MD    Allergies: Codeine, Stadol [Butorphanol], Vistaril [Hydroxyzine Hcl]    Isolation: Droplet   Infection: Rhinovirus  (06/07/25)   Code Status: CPR    Ht: 170.2 cm (67.01\")   Wt: 87.4 kg (192 lb 9.6 oz)    Admission Cmt: None   Principal Problem: Shoulder fracture, right, closed, initial encounter [S42.91XA]                   Active Insurance as of 6/7/2025       Primary Coverage       Payor Plan Insurance Group Employer/Plan Group    HU[employer] MEDICARE REPLACEMENT HU[employer] MEDICARE ADVANTAGE PPO [id number]       Payor Plan Address Payor Plan Phone Number Payor Plan Fax Number Effective Dates    [address] [address] [phone]  1/1/2025 - None Entered    [facility] [id number]         Subscriber Name Subscriber Birth Date Member ID       TIARA STUBBS 1945 [id number]                     Emergency Contacts        (Rel.) Home Phone Work Phone Mobile Phone    Keya Quinonez (Daughter) [phone] -- --                 History & Physical        Tico Mckoy PA-C at 06/07/25 1022       Attestation signed by Amanda Pineda DO at 06/07/25 1630      I have evaluated the patient independently, I have reviewed H&P, all labs and image reports from today and evaluated the patient at bedside.  I have discussed with Tico Mckoy PA-C and agree.  Patient's case and images were " discussed with Dr. Carlin by the outside facility. Initial recommendation was for patient to transfer to [facility] so that the surgery could be done this weekend, but patient refused multiple times per my discussion with the ER provider, and requested transfer to our facility. Due to the needed equipment having to be brought to our facility it will likely be Monday or Tuesday before she is able to have surgery done.                          [facility]  History & Physical    Patient Identification:  Name:  Tiara Stubbs  Age:  80 y.o.  Sex:  female  :  1945  MRN:  [id number]   Visit Number:  [id number]  Admit Date: 2025   Primary Care Physician:  Paula Downey APRN    Subjective     Chief complaint: Transfer from OSH due to fall, fractures    History of presenting illness:      Tiara Stubbs is a 80 y.o. female who presented for further evaluation and management of multiple fractures s/p fall.  Per report from transferring facility patient fell from standing height and on OSH ED workup was found to have a right humeral fracture as well as right shoulder dislocation and an ankle fracture.  She was seen and examined on 3S with family at the bedside. She is generally comfortable appearing but does have intermittent pain with noted grimacing. She states had a mechanical fall when standing from the toilet though did not have any preceding dizziness. She landed on her right side. She did hit her head, just lateral to the right eye and has bruising. She and family state CT head was completed at OSH and negative. She states normally doesn't have any trouble ambulating at home, doesn't require walker or cane. She does live with her daughter and EMILY.   She reports she has otherwise been feeling okay prior to the fall. Has had recent issues with pneumonia/bronchitis but reported has completed treatment with no SOA or cough reported today. She denies any  chest pain, no history of heart disease. She does take daily ASA at the direction of her rheumatologist. She denies any abdominal pain, nausea, vomiting, diarrhea. She is not having difficulty urinating.   She has history of RA- historically on methotrexate but only taking prednisone currently.     Past medical history is significant for RA, osteoarthritis, hypertension, lupus, hyperlipidemia, GERD      Known Emergency Department medications received prior to my evaluation included morphine.   Room location at the time of my evaluation was 304b.     ---------------------------------------------------------------------------------------------------------------------   Review of Systems   Constitutional:  Negative for chills and fever.   HENT:  Negative for congestion and rhinorrhea.    Respiratory:  Negative for cough and shortness of breath.    Cardiovascular:  Negative for chest pain and leg swelling.   Gastrointestinal:  Negative for abdominal pain, diarrhea, nausea and vomiting.   Genitourinary:  Negative for difficulty urinating and dysuria.   Musculoskeletal:  Positive for arthralgias and myalgias.   Skin:  Negative for rash and wound.   Neurological:  Negative for dizziness and light-headedness.        ---------------------------------------------------------------------------------------------------------------------   Past Medical History:   Diagnosis Date    Acid reflux     Fibromyalgia     High cholesterol     Hx: recurrent pneumonia     Hypertension     Lupus     Osteoarthritis     Rheumatoid arthritis      Past Surgical History:   Procedure Laterality Date    CARPAL TUNNEL RELEASE Bilateral     KNEE SURGERY Right     TOTAL WRIST ARTHROPLASTY Right      Family History   Problem Relation Age of Onset    Heart disease Mother     Liver cancer Mother     Heart disease Father     Breast cancer Neg Hx      Social History     Socioeconomic History    Marital status:    Tobacco Use    Smoking status: Never      Passive exposure: Never    Smokeless tobacco: Never   Vaping Use    Vaping status: Never Used   Substance and Sexual Activity    Alcohol use: Never    Drug use: Never    Sexual activity: Defer     ---------------------------------------------------------------------------------------------------------------------   Allergies:  Codeine, Stadol [butorphanol], and Vistaril [hydroxyzine hcl]  ---------------------------------------------------------------------------------------------------------------------   Home medications:    Medications below are reported home medications pulling from within the system; at this time, these medications have not been reconciled unless otherwise specified and are in the verification process for further verifcation as current home medications.  Medications Prior to Admission   Medication Sig Dispense Refill Last Dose/Taking    albuterol (ACCUNEB) 1.25 MG/3ML nebulizer solution 3 mL.       aspirin 81 MG EC tablet Take 1 tablet by mouth Daily.       azelastine (ASTELIN) 0.1 % nasal spray 1 spray 2 (Two) Times a Day.       benzonatate (TESSALON) 100 MG capsule 1 capsule.       Budeson-Glycopyrrol-Formoterol (BREZTRI) 160-9-4.8 MCG/ACT aerosol inhaler 2 (Two) Times a Day.       Cholecalciferol 50 MCG (2000 UT) tablet Daily.       cyclobenzaprine (FLEXERIL) 10 MG tablet        Diclofenac Sodium (VOLTAREN) 1 % gel gel APPLY 4 GRAMS TOPICALLY FOUR TIMES DAILY AS NEEDED FOR PAIN. APPLY TO A SINGLE KNEE ANKLE FOOT (FOR FOOT INCLUDES SOLE TOES TOP OF FOOT       famotidine (PEPCID) 40 MG tablet        fluticasone (FLONASE) 50 MCG/ACT nasal spray        folic acid (FOLVITE) 1 MG tablet Take one tablet every day except mondays       gabapentin (NEURONTIN) 400 MG capsule        HYDROcodone-acetaminophen (NORCO)  MG per tablet TAKE 1 TABLET BY MOUTH FOUR TIMES DAILY AS NEEDED FOR PAIN (DO NOT FILL UNTIL ON OR AFTER 09/26/21)       ibandronate (BONIVA) 150 MG tablet TAKE 1 TABLET BY MOUTH  ONCE every MONTH       lactulose (CHRONULAC) 10 GM/15ML solution        methotrexate 2.5 MG tablet Take 8 tablets by mouth.       metoprolol succinate XL (TOPROL-XL) 25 MG 24 hr tablet        olmesartan (BENICAR) 40 MG tablet TAKE 1 TABLET BY MOUTH ONCE DAILY AS DIRECTED Dose Increase       predniSONE (DELTASONE) 1 MG tablet 4 tablets.       rosuvastatin (CRESTOR) 20 MG tablet 1 tablet.       valACYclovir (VALTREX) 500 MG tablet 1 tablet.          Hospital Scheduled Meds:  senna-docusate sodium, 2 tablet, Oral, BID  sodium chloride, 10 mL, Intravenous, Q12H           Current listed hospital scheduled medications may not yet reflect those currently placed in orders that are signed and held awaiting patient's arrival to floor.   ---------------------------------------------------------------------------------------------------------------------     Objective     Vital Signs:     There were no vitals filed for this visit.  There is no height or weight on file to calculate BMI.  ---------------------------------------------------------------------------------------------------------------------       Physical Exam  Vitals and nursing note reviewed.   Constitutional:       General: She is not in acute distress.  HENT:      Head: Normocephalic.   Eyes:      Extraocular Movements: Extraocular movements intact.      Comments: There is bruising just lateral of her right eye   Cardiovascular:      Rate and Rhythm: Normal rate and regular rhythm.   Pulmonary:      Effort: Pulmonary effort is normal. No respiratory distress.   Abdominal:      Palpations: Abdomen is soft.   Musculoskeletal:      Left lower leg: No edema.      Comments: Splint is in place to the right lower extremity  Sling in place to right upper extremity   Skin:     General: Skin is warm and dry.   Neurological:      Mental Status: She is alert. Mental status is at baseline.   Psychiatric:         Mood and Affect: Mood normal.  "            ---------------------------------------------------------------------------------------------------------------------  EKG:      ---------------------------------------------------------------------------------------------------------------------                 Invalid input(s): \"PROT\"CrCl cannot be calculated (No successful lab value found.).  No results found for: \"AMMONIA\"          No results found for: \"HGBA1C\"  No results found for: \"TSH\", \"FREET4\"  No results found for: \"PREGTESTUR\", \"PREGSERUM\", \"HCG\", \"HCGQUANT\"  Pain Management Panel           No data to display              No results found for: \"BLOODCX\"  No results found for: \"URINECX\"  No results found for: \"WOUNDCX\"  No results found for: \"STOOLCX\"      ---------------------------------------------------------------------------------------------------------------------  Imaging Results (Last 7 Days)       ** No results found for the last 168 hours. **            Cultures:  No results found for: \"BLOODCX\", \"URINECX\", \"WOUNDCX\", \"MRSACX\", \"RESPCX\", \"STOOLCX\"    Last echocardiogram:  Results for orders placed during the hospital encounter of 11/17/21    Adult Transthoracic Echo Complete W/ Cont if Necessary Per Protocol    Interpretation Summary  · Estimated left ventricular EF = 65% Left ventricular ejection fraction appears to be 61 - 65%. Left ventricular systolic function is normal.  · Left ventricular diastolic function was normal.  · Estimated right ventricular systolic pressure from tricuspid regurgitation is mildly elevated (35-45 mmHg).  · Moderate pulmonary hypertension is present.  · No pericardial effusion  · No prev echo          I have personally reviewed the above radiology images and read the final radiology report on 06/07/25  ---------------------------------------------------------------------------------------------------------------------  Assessment / Plan     Active Hospital Problems    Diagnosis  POA    **Shoulder " fracture, right, closed, initial encounter [S42.91XA]  Yes       ASSESSMENT/PLAN:    Mechanical fall, PTA  Right humerus fracture  Right shoulder dislocation  Right fifth metatarsal fracture  Patient presented from OSH for further management of multiple fractures s/p mechanical fall.  Per report patient has fractured right humerus with dislocated right shoulder and fractured her right fifth metatarsal.  Orthopedic surgery consulted for further assistance and recommendations, appreciated.  Per report plan will be for right shoulder replacement-will be delayed until Monday or Tuesday when surgical equipment can be relocated to this hospital as patient has refused transfer to [facility]  Admit to the telemetry unit  Continue pain regimen and adjust as needed  Will obtain preprocedure EKG  Basic laboratory workup is pending  She will benefit from PT OT postoperatively  May also require CM/SW assistance with discharge planning.    Chronic:  Hyperlipidemia  RA/osteoarthritis  Lupus  Hypertension  GERD  Continue home medication regimen as indicated once med rec is complete  Will continue to monitor vital signs closely    ----------  -DVT prophylaxis: SCDs  -Activity: Bedrest  -Expected length of stay: INPATIENT status due to the need for care which can only be reasonably provided in an hospital setting such as aggressive/expedited ancillary services and/or consultation services, the necessity for IV medications, close physician monitoring and/or the possible need for procedures.  In such, I feel patient’s risk for adverse outcomes and need for care warrant INPATIENT evaluation and predict the patient’s care encounter to likely last beyond 2 midnights.   -Disposition Pending further clinical course and progress with PT OT post op    High risk secondary to Fall with multiple fractures    There are no questions and answers to display.       Tico Mckoy PA-C   06/07/25  10:22 EDT     Electronically signed by  Amanda Pineda,  at 06/07/25 1633       Vital Signs (last day)       Date/Time Temp Temp src Pulse Resp BP Patient Position SpO2    06/12/25 1441 98.6 (37) Oral 110 16 124/59 Lying 96    06/12/25 0645 97.7 (36.5) Oral 104 16 129/59 Lying 98    06/12/25 0300 97.5 (36.4) Oral 106 20 172/70 Lying --    06/11/25 2052 -- -- -- -- -- -- 91    06/11/25 2050 -- -- -- -- -- -- 85    06/11/25 1836 98.5 (36.9) Oral 106 17 125/62 Lying 95    06/11/25 1722 -- -- -- -- -- -- 96    06/11/25 1300 97.9 (36.6) Oral 99 16 148/58 Lying 94    06/11/25 0638 98.7 (37.1) Oral 89 16 121/50 Lying 96    06/11/25 0300 97.6 (36.4) Oral 98 20 116/54 Lying --          Lines, Drains & Airways       Active LDAs       Name Placement date Placement time Site Days    Peripheral IV 06/10/25 1550 22 G Left;Posterior Forearm 06/10/25  1550  Forearm  1                  Current Facility-Administered Medications   Medication Dose Route Frequency Provider Last Rate Last Admin    sennosides-docusate (PERICOLACE) 8.6-50 MG per tablet 2 tablet  2 tablet Oral BID Ameya Carlin MD   2 tablet at 06/12/25 0806    And    polyethylene glycol (MIRALAX) packet 17 g  17 g Oral Daily PRN Ameya Carlin MD   17 g at 06/10/25 0853    And    bisacodyl (DULCOLAX) EC tablet 5 mg  5 mg Oral Daily PRN Ameya Carlin MD        And    bisacodyl (DULCOLAX) suppository 10 mg  10 mg Rectal Daily PRN Ameya Carlin MD        cefTRIAXone (ROCEPHIN) 1,000 mg in sodium chloride 0.9 % 100 mL IVPB-VTB  1,000 mg Intravenous Q24H Simone Coelho  mL/hr at 06/11/25 1515 1,000 mg at 06/11/25 1515    cholecalciferol (VITAMIN D3) tablet 2,000 Units  2,000 Units Oral Daily Ameya Carlin MD   2,000 Units at 06/12/25 0806    cyclobenzaprine (FLEXERIL) tablet 10 mg  10 mg Oral Nightly Ameya Carlin MD        doxycycline (VIBRAMYCIN) 100 mg in sodium chloride 0.9 % 100 mL IVPB-VTB  100 mg Intravenous Q12H Simone Coelho MD   100 mg at 06/12/25 0306    famotidine  (PEPCID) tablet 40 mg  40 mg Oral Daily Ameya Carlin MD   40 mg at 06/12/25 0806    folic acid (FOLVITE) tablet 1,000 mcg  1,000 mcg Oral Daily Ameya Carlin MD   1,000 mcg at 06/12/25 0806    gabapentin (NEURONTIN) capsule 400 mg  400 mg Oral TID Ameya Carlin MD   400 mg at 06/12/25 0806    heparin (porcine) 5000 UNIT/ML injection 5,000 Units  5,000 Units Subcutaneous Q8H Simone Coelho MD   5,000 Units at 06/12/25 1329    losartan (COZAAR) tablet 100 mg  100 mg Oral Q24H Ameya Carlin MD   100 mg at 06/12/25 0806    metoprolol succinate XL (TOPROL-XL) 24 hr tablet 25 mg  25 mg Oral Daily Ameya Carlin MD   25 mg at 06/10/25 0852    morphine injection 4 mg  4 mg Intravenous Q3H PRN Ameya Carlin MD   4 mg at 06/11/25 2052    And    naloxone (NARCAN) injection 0.4 mg  0.4 mg Intravenous Q5 Min PRN Ameya Carlin MD        nitroglycerin (NITROSTAT) SL tablet 0.4 mg  0.4 mg Sublingual Q5 Min PRN Ameya Carlin MD        ondansetron ODT (ZOFRAN-ODT) disintegrating tablet 4 mg  4 mg Oral Q6H PRN Ameya Carlin MD        Or    ondansetron (ZOFRAN) injection 4 mg  4 mg Intravenous Q6H PRN Ameya Carlin MD   4 mg at 06/09/25 0831    ondansetron ODT (ZOFRAN-ODT) disintegrating tablet 4 mg  4 mg Oral Q6H PRN Ameya Carlin MD        oxyCODONE-acetaminophen (PERCOCET) 7.5-325 MG per tablet 2 tablet  2 tablet Oral Q4H PRN Ameya Carlin MD   2 tablet at 06/12/25 0922    predniSONE (DELTASONE) tablet 4 mg  4 mg Oral Daily Ameya Carlin MD   4 mg at 06/12/25 0923    rosuvastatin (CRESTOR) tablet 20 mg  20 mg Oral Daily Ameya Carlin MD   20 mg at 06/12/25 0806    sodium chloride 0.9 % flush 10 mL  10 mL Intravenous Q12H Ameya Carlin MD   10 mL at 06/12/25 0806    sodium chloride 0.9 % flush 10 mL  10 mL Intravenous PRN Ameya Carlin MD        sodium chloride 0.9 % infusion 40 mL  40 mL Intravenous PRN Ameya Carlin MD        valACYclovir  (VALTREX) tablet 500 mg  500 mg Oral Daily Ameya Carlin MD   500 mg at 06/12/25 0806        Operative/Procedure Notes (most recent note)        Ameya Carlin MD at 06/09/25 1339          Operative report Surgeon Ameya Myles    Preoperative diagnosis right proximal humerus 4 parts fracture dislocation  Postoperative diagnosis the same  Surgical procedure right shoulder reverse arthroplasty using DePuy system    After proper patient limb identification bring to the operating room general anesthesia beachchair sitting position chlorhexidine scrubbing and sterile draping proper timeout performed.  Anterior longitudinal incision deltopectoral interval retracting the cephalic vein with the deltoid.  Arriving at the fracture site.  The greater tuberosity is still attached with the rotator cuff.  Tagging it at the tendon bone junction with FiberWire #2 with 2 different sutures.  Retracting it posteriorly.  The lesser tuberosity is present as well and tagging it with FiberWire #2 at the tendon bone junction.  Humeral head is present impacted at the tip of the shaft of the humerus.  Removing the articular surface.  Irrigation thoroughly with sterile fluid.  The having a good access to the glenoid.  Removing remnant of the biceps tendon.  Then insertion of guidepin in the central slightly inferior portion reaming with the adequate reamer and impaction of the baseplate secured with the central screw 35 mm.  Inferior screw 25 mm and superior anterior superior posterior screw were inserted.  32 mm neutral +4 mm sphere was inserted.  Irrigation with sterile fluid.  Drilling hole through the shaft of the humerus to pass in the FiberWire.  Then broaching with the adequate broach.  Size large long stem.  With 30 degree of retroversion.  Trial with a 0 show very good fit and stability.  Definitive implant inserted in that fashion.  Then reapproximation of the tuberosity after reduction of the prosthesis.  To wrap the  proximal body of the prosthesis.  Tying them together in a crisscross fashion.  With the sutures.  Then passing the suture through from the shaft to the tuberosity together and tying them together.  Very good reapproximation is present.  Irrigation with sterile fluid.  Deltopectoral interval closed with Vicryl 1 mattress suture skin closed Vicryl 1 Monocryl 2 oh and mL clip.  Large dressing was applied and patient returned to recovery in stable condition  Blood loss about 50 cc    Electronically signed by Ameya Carlin MD at 06/09/25 1543          Physician Progress Notes (most recent note)        Alejandra Baron APRN at 06/12/25 1308       Summary:Postoperative day #3                   Interval History:     Tiara is a an 80-year-old female who is postoperative day #3 after undergoing a reverse right total shoulder arthroplasty due to fracture.  She is currently sitting up in the recliner at bedside with her abductor sling in place and reports that she is experiencing fairly mild to moderate pain.  She states that the right foot is not giving her any complaints at this time.  It was noted by nursing staff that the patient did have a slight drop in her O2 saturation and was placed on 1 L nasal cannula while sleeping.  Outside of this no other acute events were noted at night. Patient currently denies any acute distress, chest pain, or shortness of air.        Objective     Vital Signs  Temp:  [97.5 °F (36.4 °C)-98.5 °F (36.9 °C)] 97.7 °F (36.5 °C)  Heart Rate:  [104-106] 104  Resp:  [16-20] 16  BP: (125-172)/(59-70) 129/59    Labs & Rads  Lab Results (last 72 hours)       Procedure Component Value Units Date/Time    Basic Metabolic Panel [[id number]]  (Abnormal) Collected: 06/12/25 0201    Specimen: Blood Updated: 06/12/25 0245     Glucose 84 mg/dL      BUN 21.1 mg/dL      Creatinine 0.97 mg/dL      Sodium 143 mmol/L      Potassium 4.4 mmol/L      Chloride 115 mmol/L      CO2 19.6 mmol/L      Calcium 7.8  mg/dL      BUN/Creatinine Ratio 21.8     Anion Gap 8.4 mmol/L      eGFR 59.2 mL/min/1.73     Narrative:      GFR Categories in Chronic Kidney Disease (CKD)              GFR Category          GFR (mL/min/1.73)    Interpretation  G1                    90 or greater        Normal or high (1)  G2                    60-89                Mild decrease (1)  G3a                   45-59                Mild to moderate decrease  G3b                   30-44                Moderate to severe decrease  G4                    15-29                Severe decrease  G5                    14 or less           Kidney failure    (1)In the absence of evidence of kidney disease, neither GFR category G1 or G2 fulfill the criteria for CKD.    eGFR calculation 2021 CKD-EPI creatinine equation, which does not include race as a factor    CBC & Differential [[id number]]  (Abnormal) Collected: 06/12/25 0201    Specimen: Blood Updated: 06/12/25 0215    Narrative:      The following orders were created for panel order CBC & Differential.  Procedure                               Abnormality         Status                     ---------                               -----------         ------                     CBC Auto Differential[[id number]]        Abnormal            Final result               Scan Slide[[id number]]                                                                    Please view results for these tests on the individual orders.    CBC Auto Differential [[id number]]  (Abnormal) Collected: 06/12/25 0201    Specimen: Blood Updated: 06/12/25 0215     WBC 11.22 10*3/mm3      RBC 2.54 10*6/mm3      Hemoglobin 8.2 g/dL      Hematocrit 27.1 %      .7 fL      MCH 32.3 pg      MCHC 30.3 g/dL      RDW 13.9 %      RDW-SD 54.4 fl      MPV 10.9 fL      Platelets 193 10*3/mm3      Neutrophil % 63.7 %      Lymphocyte % 23.6 %      Monocyte % 8.5 %      Eosinophil % 2.5 %      Basophil % 0.5 %      Immature Grans % 1.2 %      Neutrophils,  Absolute 7.15 10*3/mm3      Lymphocytes, Absolute 2.65 10*3/mm3      Monocytes, Absolute 0.95 10*3/mm3      Eosinophils, Absolute 0.28 10*3/mm3      Basophils, Absolute 0.06 10*3/mm3      Immature Grans, Absolute 0.13 10*3/mm3      nRBC 0.0 /100 WBC     Urine Culture - Urine, Urine, Catheter [[id number]]  (Normal) Collected: 06/10/25 1448    Specimen: Urine, Catheter Updated: 06/11/25 2044     Urine Culture No growth    Comprehensive Metabolic Panel [[id number]]  (Abnormal) Collected: 06/11/25 0833    Specimen: Blood Updated: 06/11/25 0905     Glucose 99 mg/dL      BUN 28.9 mg/dL      Creatinine 1.17 mg/dL      Sodium 142 mmol/L      Potassium 3.9 mmol/L      Comment: Specimen hemolyzed.  Result may be falsely elevated.        Chloride 112 mmol/L      CO2 20.5 mmol/L      Calcium 8.1 mg/dL      Total Protein 5.9 g/dL      Albumin 2.9 g/dL      ALT (SGPT) 14 U/L      AST (SGOT) 55 U/L      Alkaline Phosphatase 54 U/L      Total Bilirubin 0.4 mg/dL      Globulin 3.0 gm/dL      A/G Ratio 1.0 g/dL      BUN/Creatinine Ratio 24.7     Anion Gap 9.5 mmol/L      eGFR 47.3 mL/min/1.73     Narrative:      GFR Categories in Chronic Kidney Disease (CKD)              GFR Category          GFR (mL/min/1.73)    Interpretation  G1                    90 or greater        Normal or high (1)  G2                    60-89                Mild decrease (1)  G3a                   45-59                Mild to moderate decrease  G3b                   30-44                Moderate to severe decrease  G4                    15-29                Severe decrease  G5                    14 or less           Kidney failure    (1)In the absence of evidence of kidney disease, neither GFR category G1 or G2 fulfill the criteria for CKD.    eGFR calculation 2021 CKD-EPI creatinine equation, which does not include race as a factor    CBC & Differential [[id number]]  (Abnormal) Collected: 06/11/25 0833    Specimen: Blood Updated: 06/11/25 0851    Narrative:       The following orders were created for panel order CBC & Differential.  Procedure                               Abnormality         Status                     ---------                               -----------         ------                     CBC Auto Differential[[id number]]        Abnormal            Final result               Scan Slide[[id number]]                                                                    Please view results for these tests on the individual orders.    CBC Auto Differential [[id number]]  (Abnormal) Collected: 06/11/25 0833    Specimen: Blood Updated: 06/11/25 0850     WBC 14.23 10*3/mm3      RBC 2.87 10*6/mm3      Hemoglobin 9.3 g/dL      Hematocrit 31.1 %      .4 fL      MCH 32.4 pg      MCHC 29.9 g/dL      RDW 13.8 %      RDW-SD 55.7 fl      MPV 11.4 fL      Platelets 194 10*3/mm3      Neutrophil % 69.4 %      Lymphocyte % 20.1 %      Monocyte % 8.0 %      Eosinophil % 1.5 %      Basophil % 0.4 %      Immature Grans % 0.6 %      Neutrophils, Absolute 9.86 10*3/mm3      Lymphocytes, Absolute 2.86 10*3/mm3      Monocytes, Absolute 1.14 10*3/mm3      Eosinophils, Absolute 0.22 10*3/mm3      Basophils, Absolute 0.06 10*3/mm3      Immature Grans, Absolute 0.09 10*3/mm3      nRBC 0.0 /100 WBC     Urinalysis, Microscopic Only - Urine, Catheter [[id number]]  (Abnormal) Collected: 06/10/25 1448    Specimen: Urine, Catheter Updated: 06/10/25 1503     RBC, UA Too Numerous to Count /HPF      WBC, UA 21-50 /HPF      Bacteria, UA None Seen /HPF      Squamous Epithelial Cells, UA 3-6 /HPF      Hyaline Casts, UA 3-6 /LPF      Methodology Automated Microscopy    Urinalysis With Culture If Indicated - Urine, Catheter [[id number]]  (Abnormal) Collected: 06/10/25 1448    Specimen: Urine, Catheter Updated: 06/10/25 1503     Color, UA Yellow     Appearance, UA Clear     pH, UA 6.0     Specific Gravity, UA 1.019     Glucose, UA Negative     Ketones, UA Negative     Bilirubin, UA Negative     Blood, UA  Moderate (2+)     Protein, UA Trace     Leuk Esterase, UA Moderate (2+)     Nitrite, UA Negative     Urobilinogen, UA 1.0 E.U./dL    Narrative:      In absence of clinical symptoms, the presence of pyuria, bacteria, and/or nitrites on the urinalysis result does not correlate with infection.    Hepatitis Panel, Acute [[id number]]  (Normal) Collected: 06/10/25 0259    Specimen: Blood Updated: 06/10/25 0359     Hepatitis B Surface Ag Non-Reactive     Hep A IgM Non-Reactive     Hep B C IgM Non-Reactive     Hepatitis C Ab Non-Reactive    Narrative:      Results may be falsely decreased if patient taking Biotin.     Comprehensive Metabolic Panel [[id number]]  (Abnormal) Collected: 06/10/25 0259    Specimen: Blood Updated: 06/10/25 0354     Glucose 121 mg/dL      BUN 24.9 mg/dL      Creatinine 1.27 mg/dL      Sodium 137 mmol/L      Potassium 4.0 mmol/L      Chloride 107 mmol/L      CO2 18.3 mmol/L      Calcium 8.0 mg/dL      Total Protein 5.9 g/dL      Albumin 3.1 g/dL      ALT (SGPT) 18 U/L      AST (SGOT) 42 U/L      Alkaline Phosphatase 46 U/L      Total Bilirubin 0.4 mg/dL      Globulin 2.8 gm/dL      A/G Ratio 1.1 g/dL      BUN/Creatinine Ratio 19.6     Anion Gap 11.7 mmol/L      eGFR 42.8 mL/min/1.73     Narrative:      GFR Categories in Chronic Kidney Disease (CKD)              GFR Category          GFR (mL/min/1.73)    Interpretation  G1                    90 or greater        Normal or high (1)  G2                    60-89                Mild decrease (1)  G3a                   45-59                Mild to moderate decrease  G3b                   30-44                Moderate to severe decrease  G4                    15-29                Severe decrease  G5                    14 or less           Kidney failure    (1)In the absence of evidence of kidney disease, neither GFR category G1 or G2 fulfill the criteria for CKD.    eGFR calculation 2021 CKD-EPI creatinine equation, which does not include race as a factor     CBC & Differential [[id number]]  (Abnormal) Collected: 06/10/25 0259    Specimen: Blood Updated: 06/10/25 0337    Narrative:      The following orders were created for panel order CBC & Differential.  Procedure                               Abnormality         Status                     ---------                               -----------         ------                     CBC Auto Differential[[id number]]        Abnormal            Final result                 Please view results for these tests on the individual orders.    CBC Auto Differential [[id number]]  (Abnormal) Collected: 06/10/25 0259    Specimen: Blood Updated: 06/10/25 0337     WBC 20.69 10*3/mm3      RBC 3.09 10*6/mm3      Hemoglobin 10.0 g/dL      Hematocrit 31.9 %      .2 fL      MCH 32.4 pg      MCHC 31.3 g/dL      RDW 13.8 %      RDW-SD 51.9 fl      MPV 11.1 fL      Platelets 162 10*3/mm3      Neutrophil % 77.3 %      Lymphocyte % 9.8 %      Monocyte % 12.1 %      Eosinophil % 0.0 %      Basophil % 0.2 %      Immature Grans % 0.6 %      Neutrophils, Absolute 16.00 10*3/mm3      Lymphocytes, Absolute 2.02 10*3/mm3      Monocytes, Absolute 2.50 10*3/mm3      Eosinophils, Absolute 0.00 10*3/mm3      Basophils, Absolute 0.04 10*3/mm3      Immature Grans, Absolute 0.13 10*3/mm3      nRBC 0.0 /100 WBC           Imaging Results (Last 72 Hours)       Procedure Component Value Units Date/Time    XR Chest 1 View [[id number]] Collected: 06/10/25 1503     Updated: 06/10/25 1509    Narrative:      EXAM:    XR Chest, 1 View     EXAM DATE:    6/10/2025 3:03 PM     CLINICAL HISTORY:    SIRS; S42.91XA-Fracture of right shoulder girdle, part unspecified,  initial encounter for closed fracture; S92.901A-Unspecified fracture of  right foot, initial encounter for closed fracture; M25.571-Pain in right  ankle and joints of right foot; Z98.890-Other specified postprocedural  states     TECHNIQUE:    Frontal view of the chest.     COMPARISON:    6/7/2025     FINDINGS:     Lungs and pleural spaces:  Right lower lobe airspace disease.  No  consolidation.  No pneumothorax.    Heart:  Unremarkable as visualized.  No cardiomegaly.    Mediastinum:  Unremarkable as visualized.  Normal mediastinal contour.    Bones/joints:  Unremarkable as visualized.  No acute fracture.       Impression:        Right lower lobe airspace disease.     This report was finalized on 6/10/2025 3:07 PM by Dr. Aime Haywood MD.       XR Shoulder 2+ View Right [[id number]] Collected: 06/09/25 1636     Updated: 06/09/25 1639    Narrative:      EXAM:    XR Right Shoulder Complete, 2 or More Views     EXAM DATE:    6/9/2025 4:36 PM     CLINICAL HISTORY:    Post-Op 1 view AP only; S42.91XA-Fracture of right shoulder girdle,  part unspecified, initial encounter for closed fracture;  S92.901A-Unspecified fracture of right foot, initial encounter for  closed fracture; M25.571-Pain in right ankle and joints of right foot;  Z98.890-Other specified postprocedural states     TECHNIQUE:    Two or more views of the right shoulder.     COMPARISON:    No relevant prior studies available.     FINDINGS:    Bones/joints:  Right total shoulder arthroplasty.  Components appear  well seated.  No acute fracture.  No dislocation.    Soft tissues:  Unremarkable as visualized.       Impression:        No acute findings in the right shoulder.     This report was finalized on 6/9/2025 4:37 PM by Dr. Aime Haywood MD.                   Physical Exam:  Constitutional: Patient is alert and oriented to person and place.  Patient is confused as to the date but is able to be reoriented.  No acute distress noted.    Musculoskeletal: Upon examination of the right shoulder there is an abductor sling noted to the right upper extremity.  There is a bulky surgical dressing noted to the anterior shoulder.  No evidence of edema, ecchymosis, erythema, active drainage, or signs of an infectious process.  Patient sensation is intact to light touch with brisk  capillary refill.  Patient is able to flex and extend all digits of the right hand without complication.  There is a 2/4 palpable pulse radially.    Upon examination of the right foot there is fading ecchymosis noted laterally.  No evidence of edema, erythema, wounds, drainage, or signs of an infectious process.  Patient sensation is intact to light touch with brisk capillary refill.  Patient is able to flex and extend all digits of the right foot as well as plantar and dorsiflex at the ankle.  There is a palpable pulse distally.  CAM boot reapplied.         Assessment:  S/p reverse right total shoulder arthroplasty  Right 5th metatarsal fracture        Plan:  Continue with pain control.  Activity as tolerated with pain as the guide.     PT/OT: Patient is to remain in the sling to the right upper extremity.  Patient should refrain from any heavy lifting, pushing, pulling of the right upper extremity.     Dressing: Maintain the surgical dressing at this time.      CAM boot to the right lower extremity and weight bear as tolerated.      DVT/PPx: Patient is currently on  heparin 5000 units subcutaneous 3 times daily     Patient is to follow-up in the orthopedic office in 2 weeks with Dr. Carlin for repeat imaging and reevaluation of the right shoulder. Patient may return sooner for any worsening concerns and or complaints.    For any questions please feel free to reach out or notify orthopedics.        Discharge planning to be determined per hospitalist.        DIEGO Kent  06/12/25  13:09 EDT          Electronically signed by Alejandra Baron APRN at 06/12/25 1315          Consult Notes (most recent note)        Henrry Vincent APRN at 06/08/25 1024        Consult Orders    1. Inpatient Orthopedic Surgery Consult [[id number]] ordered by Amanda Pineda DO              Attestation signed by Ameya Carlin MD at 06/09/25 1023      I have reviewed this documentation and agree.                    Summary:Right proximal humerus fracture with shoulder dislocation and right foot fracture, DOI: 2025.                   Inpatient Orthopedic Surgery Consult  Consult performed by: Henrry Vincent APRN  Consult ordered by: Amanda Pineda DO          Patient Identification:  Name:  Tiara Stubbs  Age:  80 y.o.  Sex:  female  :  1945  MRN:  [id number]  Visit Number:  [id number]  Primary care provider:  Paula Downey APRN    History of presenting illness: This is an 80-year-old female who was transferred to this facility from an outside hospital with a right proximal humerus fracture with shoulder dislocation and right foot fracture, DOI: 2025.  The patient notes that she fell when getting up from the toilet, injuring the shoulder and foot.  Imaging at the outside facility showed a proximal humerus fracture with shoulder dislocation and a right foot fracture.  She was placed in a short leg splint to the right foot and a sling to the right upper extremity.  She was transferred to this facility for management of the fractures.  Today she notes her pain is fairly controlled with her current pain medication.  No paresthesias.  She lives at home with her daughter and son-in-law.  She is ambulatory without the use of an assist device for mobility.  She is not employed.  She denies prior injury or surgery to the right shoulder.  She takes aspirin 81 mg daily.  She is right-hand dominant.    ---------------------------------------------------------------------------------------------------------------------    Review of Systems   Constitutional:  Positive for activity change.   HENT: Negative.     Respiratory: Negative.     Cardiovascular: Negative.    Gastrointestinal: Negative.    Endocrine: Negative.    Genitourinary: Negative.    Musculoskeletal:  Positive for arthralgias and joint swelling.        Right shoulder and right foot pain.   Skin: Negative.    Neurological: Negative.        ---------------------------------------------------------------------------------------------------------------------   Past History:  Family History   Problem Relation Age of Onset    Heart disease Mother     Liver cancer Mother     Heart disease Father     Breast cancer Neg Hx      Past Medical History:   Diagnosis Date    Acid reflux     Fibromyalgia     High cholesterol     Hx: recurrent pneumonia     Hypertension     Lupus     Osteoarthritis     Rheumatoid arthritis      Past Surgical History:   Procedure Laterality Date    CARPAL TUNNEL RELEASE Bilateral     KNEE SURGERY Right     TOTAL WRIST ARTHROPLASTY Right      Social History     Socioeconomic History    Marital status:    Tobacco Use    Smoking status: Never     Passive exposure: Never    Smokeless tobacco: Never   Vaping Use    Vaping status: Never Used   Substance and Sexual Activity    Alcohol use: Never    Drug use: Never    Sexual activity: Defer     ---------------------------------------------------------------------------------------------------------------------   Allergies:  Codeine, Stadol [butorphanol], and Vistaril [hydroxyzine hcl]  ---------------------------------------------------------------------------------------------------------------------   Prior to Admission Medications       Prescriptions Last Dose Informant Patient Reported? Taking?    albuterol (ACCUNEB) 1.25 MG/3ML nebulizer solution Past Week Child, Other Yes Yes    Take 3 mL by nebulization 3 (Three) Times a Day As Needed for Wheezing or Shortness of Air.    aspirin 81 MG EC tablet 6/6/2025  Yes Yes    Take 1 tablet by mouth Daily.    Cholecalciferol 50 MCG (2000 UT) tablet 6/6/2025 Child, Other Yes Yes    Take 1 tablet by mouth Daily.    cyclobenzaprine (FLEXERIL) 10 MG tablet 6/6/2025 Child, Other Yes Yes    Take 1 tablet by mouth Every Night.    famotidine (PEPCID) 40 MG tablet 6/6/2025 Child, Other Yes Yes    Take 1 tablet by mouth Daily.    folic acid  (FOLVITE) 1 MG tablet 6/6/2025 Other, Child Yes Yes    Take 1 tablet by mouth Daily.    gabapentin (NEURONTIN) 400 MG capsule 6/6/2025 Child, Other Yes Yes    Take 1 capsule by mouth 3 (Three) Times a Day.    HYDROcodone-acetaminophen (NORCO)  MG per tablet 6/6/2025 Child, Other Yes Yes    Take 1 tablet by mouth 4 (Four) Times a Day.    metoprolol succinate XL (TOPROL-XL) 25 MG 24 hr tablet 6/6/2025 Child, Other Yes Yes    Take 1 tablet by mouth Daily.    olmesartan (BENICAR) 40 MG tablet 6/6/2025 Child, Other Yes Yes    Take 1 tablet by mouth Daily.    predniSONE (DELTASONE) 1 MG tablet 6/6/2025 Child, Other Yes Yes    Take 4 tablets by mouth Daily.    rosuvastatin (CRESTOR) 20 MG tablet 6/6/2025 Other, Child Yes Yes    Take 1 tablet by mouth Daily.    valACYclovir (VALTREX) 500 MG tablet 6/6/2025 Child, Other Yes Yes    Take 1 tablet by mouth Daily.          Hospital Meds:  cholecalciferol, 2,000 Units, Oral, Daily  cyclobenzaprine, 10 mg, Oral, Nightly  famotidine, 40 mg, Oral, Daily  folic acid, 1,000 mcg, Oral, Daily  gabapentin, 400 mg, Oral, TID  HYDROcodone-acetaminophen, 1 tablet, Oral, 4x Daily  losartan, 100 mg, Oral, Q24H  metoprolol succinate XL, 25 mg, Oral, Daily  predniSONE, 4 mg, Oral, Daily  rosuvastatin, 20 mg, Oral, Daily  senna-docusate sodium, 2 tablet, Oral, BID  sodium chloride, 10 mL, Intravenous, Q12H  valACYclovir, 500 mg, Oral, Daily         ---------------------------------------------------------------------------------------------------------------------   Vital Signs:  Temp:  [97.8 °F (36.6 °C)-99.1 °F (37.3 °C)] 97.8 °F (36.6 °C)  Heart Rate:  [] 106  Resp:  [16-20] 20  BP: (144-158)/(65-77) 152/75      06/07/25  1005 06/08/25  0500   Weight: 90.7 kg (199 lb 15.3 oz) 88.1 kg (194 lb 3.6 oz)     Body mass index is 30.42 kg/m².  ---------------------------------------------------------------------------------------------------------------------     Physical  exam:  Constitutional:  Well-developed and well-nourished.  No acute distress.      HENT:  Head: Normocephalic and atraumatic.  Mouth:  Moist mucous membranes.    Eyes:  Conjunctivae are normal.  Pupils are equal, round, and reactive to light.  No scleral icterus.  Neck:  Neck supple.  Trachea midline.  Cardiovascular:  Normal rate and regular rhythm.  Pulmonary/Chest:  No respiratory distress and good air movement.  Abdominal:  Soft.  No distension and no tenderness.   Musculoskeletal: Upon examination of the right shoulder, there is a fullness to the anterior shoulder, minimal ecchymosis, no erythema, no open wounds, generalized tenderness.  She is able to flex extend the elbow.  She is unable to flex or extend the wrist due to a prior fusion.  She is able to flex and extend the digits.  Capillary refill is brisk and light touch sensation is intact distally.  Radial pulse present.  Upon examination of the right foot, there is a short leg splint in place.  Splint is well-fitted.  No edema, erythema, ecchymosis, or edema to the surrounding tissues.  Tissues are soft.  She is able to flex and extend the digits.  Capillary refill is brisk and light touch sensation is intact distally.  Neurological:  Alert and oriented to person, place, and time.     Skin:  Skin is warm and dry.  No rash noted.  No ecchymosis or abrasion.   Psychiatric:  Normal mood and affect.  Behavior is normal.  Judgment and thought content normal.   Peripheral vascular:  No pitting edema and strong pulses on all 4 extremities.      ---------------------------------------------------------------------------------------------------------------------   .  ---------------------------------------------------------------------------------------------------------------------   Results from last 7 days   Lab Units 06/07/25  1120   WBC 10*3/mm3 11.17*   HEMOGLOBIN g/dL 12.3   HEMATOCRIT % 37.8   MCV fL 101.3*   MCHC g/dL 32.5   PLATELETS 10*3/mm3 158        "  Results from last 7 days   Lab Units 06/07/25  1120   SODIUM mmol/L 143   POTASSIUM mmol/L 3.9   CHLORIDE mmol/L 108*   CO2 mmol/L 22.9   BUN mg/dL 19.5   CREATININE mg/dL 0.83   CALCIUM mg/dL 8.7   GLUCOSE mg/dL 103*   ALBUMIN g/dL 3.8   BILIRUBIN mg/dL 0.6   ALK PHOS U/L 60   AST (SGOT) U/L 87*   ALT (SGPT) U/L 49*   Estimated Creatinine Clearance: 61.6 mL/min (by C-G formula based on SCr of 0.83 mg/dL).  No results found for: \"AMMONIA\"          No results found for: \"HGBA1C\"  No results found for: \"TSH\", \"FREET4\"  No results found for: \"PREGTESTUR\", \"PREGSERUM\", \"HCG\", \"HCGQUANT\"  Pain Management Panel           No data to display              No results found for: \"BLOODCX\"  No results found for: \"URINECX\"  No results found for: \"WOUNDCX\"  No results found for: \"STOOLCX\"      ---------------------------------------------------------------------------------------------------------------------   Imaging Results (Last 7 Days)       Procedure Component Value Units Date/Time    XR Chest 1 View - In process [[id number]] Resulted: 06/07/25 1158     Updated: 06/07/25 1213    This result has not been signed. Information might be incomplete.            ----------------------------------------------------------------------------------------------------------------------      Assessment:     Right proximal humerus fracture with shoulder dislocation, DOI: 6/6/2025.    Right foot fracture, DOI: 6/6/2025.      Plan:     This patient was discussed with Dr. Myles, the on-call surgeon.  He has reviewed her imaging from the outside facility and recommends a right reverse total shoulder arthroplasty to be done tomorrow.  The nature of this procedure, as well as, risks, benefits, alternatives and complications to the above operation were discussed with the patient. Risks include, but are not limited to, anesthesia complications, death, injury to nerves and vessels, infection, blood clots, continued pain and repeat or revision " surgery. Following the discussion, the patient verbalized understanding of the risks and benefits to the above procedure and elected to proceed with surgery.  Surgical consent was obtained.  It was explained to the patient that the surgery will be done tomorrow as we have to arrange for equipment to be brought from [facility].    N.P.O. after midnight.    Case request placed and  made aware of need for equipment from [facility].    Nonweightbearing to the right upper and right lower extremities.    Maintain a sling to the right upper extremity.    Maintain the short leg splint to the right lower extremity.    We will obtain x-rays of the right shoulder and right foot and ankle, no imaging available in the system here.    Pain control.    Orthopedic surgery following.    Thank you for the consult.    DIEGO Moralez  25  10:24 EDT    Electronically signed by Ameya Carlin MD at 25 1023          Physical Therapy Notes (most recent note)        Talat Trujillo, PT at 25 1141  Version 1 of 1         Acute Care - Physical Therapy Treatment Note  [facility]     Patient Name: Tiara Stubbs  : 1945  MRN: [id number]  Today's Date: 2025      Visit Dx:     ICD-10-CM ICD-9-CM   1. Shoulder fracture, right, closed, initial encounter  S42.91XA 812.00   2. Closed fracture of right foot, initial encounter  S92.901A 825.20   3. Acute right ankle pain  M25.571 719.47     338.19   4. Post-operative state  Z98.890 V45.89     Patient Active Problem List   Diagnosis    Overweight (BMI 25.0-29.9)    Immunization due    Chronic cough    SOB (shortness of breath)    Shoulder fracture, right, closed, initial encounter    Closed fracture of bone of right foot    Acute right ankle pain     Past Medical History:   Diagnosis Date    Acid reflux     Cancer     skin    Fibromyalgia     High cholesterol     Hx: recurrent pneumonia     Hypertension     Lupus     Osteoarthritis      Rheumatoid arthritis     Shoulder injury      Past Surgical History:   Procedure Laterality Date    CARPAL TUNNEL RELEASE Bilateral     KNEE SURGERY Right     TOTAL WRIST ARTHROPLASTY Right      PT Assessment (Last 12 Hours)       PT Evaluation and Treatment       [facility] Name 06/12/25 1137          Physical Therapy Time and Intention    Subjective Information no complaints  -KM     Document Type therapy note (daily note)  -KM     Mode of Treatment physical therapy  -KM     Patient Effort good  -KM     Symptoms Noted During/After Treatment fatigue  -KM       Row Name 06/12/25 1137          General Information    Patient Profile Reviewed yes  -KM     Patient Observations alert;cooperative;agree to therapy  -KM     Existing Precautions/Restrictions fall  -KM       Row Name 06/12/25 1137          Pain Scale: FACES Pre/Post-Treatment    Pain: FACES Scale, Pretreatment 2-->hurts little bit  -KM     Posttreatment Pain Rating 2-->hurts little bit  -KM       [facility] Name 06/12/25 1137          Cognition    Affect/Mental Status (Cognition) --  pleasantly confused  -KM     Orientation Status (Cognition) oriented to;person;verbal cues/prompts needed for orientation;situation  -KM     Follows Commands (Cognition) follows one-step commands  -KM       [facility] Name 06/12/25 1137          Bed Mobility    Bed Mobility supine-sit  -KM     Supine-Sit [place] (Bed Mobility) moderate assist (50% patient effort);2 person assist  -KM     Assistive Device (Bed Mobility) bed rails;head of bed elevated  -[facility] Name 06/12/25 1137          Transfers    Transfers sit-stand transfer;stand-sit transfer;bed-chair transfer  -     Comment, (Transfers) HHA  -[facility] Name 06/12/25 1137          Bed-Chair Transfer    Bed-Chair [place] (Transfers) minimum assist (75% patient effort);2 person assist  -KM       Row Name 06/12/25 1137          Sit-Stand Transfer    Sit-Stand [place] (Transfers) minimum assist (75% patient effort);2  person assist  -KM       Row Name 06/12/25 1137          Stand-Sit Transfer    Stand-Sit [place] (Transfers) minimum assist (75% patient effort);2 person assist  -KM       Row Name 06/12/25 1137          Gait/Stairs (Locomotion)    Gait/Stairs Locomotion gait/ambulation independence;gait/ambulation assistive device;distance ambulated  -KM     [place] Level (Gait) minimum assist (75% patient effort)  -KM     Assistive Device (Gait) --  HHA  -KM     Patient was able to Ambulate yes  -KM     Distance in Feet (Gait) 10  -KM     Pattern (Gait) step-to  -KM     Deviations/Abnormal Patterns (Gait) gait speed decreased  -KM     Right Sided Gait Deviations weight shift ability decreased  -KM     Comment, (Gait/Stairs) RLE boot donned for out of bed activity  -KM       Row Name 06/12/25 1137          Safety Issues/Impairments Affecting Functional Mobility    Impairments Affecting Function (Mobility) balance;cognition;endurance/activity tolerance;pain;range of motion (ROM);strength  -KM       Row Name 06/12/25 1137          Motor Skills    Comments, Therapeutic Exercise seated ther-ex  -KM     Additional Documentation Comments, Therapeutic Exercise (Row)  -KM       Row Name             Wound 06/09/25 1511 Right shoulder Surgical Open Surgical Incision    Wound - Properties Group Placement Date: 06/09/25  -CE Placement Time: 1511  -CE Side: Right  -CE Location: shoulder  -CE Primary Wound Type: Surgical  -CE Secondary Wound Type - Surgical: Open Surg  -CE    Retired Wound - Properties Group Placement Date: 06/09/25  -CE Placement Time: 1511  -CE Side: Right  -CE Location: shoulder  -CE    Retired Wound - Properties Group Placement Date: 06/09/25  -CE Placement Time: 1511  -CE Side: Right  -CE Location: shoulder  -CE    Retired Wound - Properties Group Date first assessed: 06/09/25  -CE Time first assessed: 1511  -CE Side: Right  -CE Location: shoulder  -CE      Row Name 06/12/25 1137          Progress Summary (PT)     Daily Progress Summary (PT) Pt. was able to demonstrate functional mobility skills w/ Lachelle-modA x2. She was able to improve ambulation quality and distance. She continues to demonstrate improved activity tolerance. Pt. would most benefit from inpatient rehab to improve mobility, strength, safety awareness, and decrease fall risk.  -               User Key  (r) = Recorded By, (t) = Taken By, (c) = Cosigned By      Initials Name Provider Type    CE Julián Seaman, RN Registered Nurse    Talat Vaughn, SHERRI Physical Therapist                    Physical Therapy Education       Title: PT OT SLP Therapies (Done)       Topic: Physical Therapy (Done)       Point: Mobility training (Done)       Learning Progress Summary            Patient Acceptance, E,TB, VU by  at 6/10/2025 1455                      Point: Home exercise program (Done)       Learning Progress Summary            Patient Acceptance, E,TB, VU by  at 6/10/2025 1455                      Point: Body mechanics (Done)       Learning Progress Summary            Patient Acceptance, E,TB, VU by  at 6/10/2025 1455                      Point: Precautions (Done)       Learning Progress Summary            Patient Acceptance, E,TB, VU by  at 6/10/2025 1455                                      User Key       Initials Effective Dates Name Provider Type Discipline     05/24/22 -  Talat Trujillo, SHERRI Physical Therapist PT                  PT Recommendation and Plan  Anticipated Discharge Disposition (PT): inpatient rehabilitation facility  Planned Therapy Interventions (PT): balance training, bed mobility training, gait training, home exercise program, patient/family education, postural re-education, ROM (range of motion), strengthening, stretching, transfer training  Therapy Frequency (PT): 5 times/wk (5x/wk)  Progress Summary (PT)  Daily Progress Summary (PT): Pt. was able to demonstrate functional mobility skills w/ Lachelle-modA x2. She was able to improve  ambulation quality and distance. She continues to demonstrate improved activity tolerance. Pt. would most benefit from inpatient rehab to improve mobility, strength, safety awareness, and decrease fall risk.  Plan of Care Reviewed With: patient  Outcome Evaluation: Pt. evaluation completed during PT session. She was able to perform functional mobility skills w/ Lachelle-modA x2. She was able to transfer but unable to ambulate at time of evaluation. Pt. would benefit from increased PT services at this time.       Time Calculation:    PT Charges       Row Name 25 1137             Time Calculation    PT Received On 25  -KM         Time Calculation- PT    Total Timed Code Minutes- PT 30 minute(s)  -KM                User Key  (r) = Recorded By, (t) = Taken By, (c) = Cosigned By      Initials Name Provider Type    KM Talat Trujillo, PT Physical Therapist                  Therapy Charges for Today       Code Description Service Date Service Provider Modifiers Qty    68477299717 HC PT THERAPEUTIC ACT EA 15 MIN 2025 Talat Trujillo, PT GP 1    10419229919 HC GAIT TRAINING EA 15 MIN 2025 Talat Trujillo, PT GP 1    81526077235 HC PT THERAPEUTIC ACT EA 15 MIN 2025 Talat Trujillo, PT GP 1    39390724742 HC GAIT TRAINING EA 15 MIN 2025 Talat Trujillo, PT GP 1            PT G-Codes  AM-PAC 6 Clicks Score (PT): 17    Talat Trujillo PT  2025      Electronically signed by Talat Trujillo PT at 25 1142          Occupational Therapy Notes (most recent note)        Tata Ruiz, OT at 25 1438          Acute Care - Occupational Therapy Treatment Note   [place]     Patient Name: Tiara Stubbs  : 1945  MRN: [id number]  Today's Date: 2025             Admit Date: 2025       ICD-10-CM ICD-9-CM   1. Shoulder fracture, right, closed, initial encounter  S42.91XA 812.00   2. Closed fracture of right foot, initial encounter  S92.901A 825.20   3. Acute right ankle pain  M25.571 719.47      338.19   4. Post-operative state  Z98.890 V45.89     Patient Active Problem List   Diagnosis    Overweight (BMI 25.0-29.9)    Immunization due    Chronic cough    SOB (shortness of breath)    Shoulder fracture, right, closed, initial encounter    Closed fracture of bone of right foot    Acute right ankle pain     Past Medical History:   Diagnosis Date    Acid reflux     Cancer     skin    Fibromyalgia     High cholesterol     Hx: recurrent pneumonia     Hypertension     Lupus     Osteoarthritis     Rheumatoid arthritis     Shoulder injury      Past Surgical History:   Procedure Laterality Date    CARPAL TUNNEL RELEASE Bilateral     KNEE SURGERY Right     TOTAL WRIST ARTHROPLASTY Right          OT ASSESSMENT FLOWSHEET (Last 12 Hours)       OT Evaluation and Treatment       Row Name 06/12/25 1434                   OT Time and Intention    Subjective Information complains of;weakness;fatigue;pain  -LM        Document Type therapy note (daily note)  -LM        Mode of Treatment occupational therapy  -LM        Patient Effort good  -LM        Comment Patient seen this date for adl retraining/fxl mobility.  Patient currently nwb rue with sling in place.  Min/mod assist with fxl transfer to recliner.  Max assist with bathing, dressing, toileting tasks.  setup with feeding.  mildly confused with min/mod cues required.  -LM           General Information    Existing Precautions/Restrictions fall;shoulder;brace on at all times  sling  -LM           Pain Assessment    Pretreatment Pain Rating 5/10  -LM        Posttreatment Pain Rating 5/10  -LM        Pain Location shoulder  -LM        Pain Side/Orientation right  -LM           Cognition    Affect/Mental Status (Cognition) confused  -LM        Orientation Status (Cognition) oriented to;person;verbal cues/prompts needed for orientation  -LM           Wound 06/09/25 1511 Right shoulder Surgical Open Surgical Incision    Wound - Properties Group Placement Date: 06/09/25  -CHERYL  Placement Time: 1511 -CE Side: Right  -CE Location: shoulder  -CE Primary Wound Type: Surgical  -CE Secondary Wound Type - Surgical: Open Surg  -CE    Retired Wound - Properties Group Placement Date: 06/09/25 -CE Placement Time: 1511 -CE Side: Right  -CE Location: shoulder  -CE    Retired Wound - Properties Group Placement Date: 06/09/25 -CE Placement Time: 1511 -CE Side: Right  -CE Location: shoulder  -CE    Retired Wound - Properties Group Date first assessed: 06/09/25 -CE Time first assessed: 1511 -CE Side: Right  -CE Location: shoulder  -CE       Positioning and Restraints    Post Treatment Position chair  -LM        In Chair call light within reach;encouraged to call for assist;with brace  -LM                  User Key  (r) = Recorded By, (t) = Taken By, (c) = Cosigned By      Initials Name Effective Dates    CE Julián Semaan, SAIMA 06/16/21 -     LM Tata Ruiz OT 06/16/21 -                            OT Recommendation and Plan              Time Calculation:     Therapy Charges for Today       Code Description Service Date Service Provider Modifiers Qty    43450294110  OT SELF CARE/MGMT/TRAIN EA 15 MIN 6/12/2025 Tata Ruiz OT GO 2                 Tata Ruiz OT  6/12/2025    Electronically signed by Tata Ruiz OT at 06/12/25 8770

## 2025-06-12 NOTE — CASE MANAGEMENT/SOCIAL WORK
Discharge Planning Assessment   [place]     Patient Name: Tiara Stubbs  MRN: [id number]  Today's Date: 6/12/2025    Admit Date: 6/7/2025       Discharge Plan       Row Name 06/12/25 0858       Plan    Plan SS notified by  inpatient rehab that [employer] medical director states the case lacks the support for an approval for Acute Rehab, but is offering a p2p that can be scheduled by calling [phone] with deadline of 06/12 at 3:35pm EST. SS notified Physicain Advisor. SS to follow.    14:30pm: Physician Advisor plans to complete peer to peer. SS faxed updated clinicals to fax [phone] for review. SS to follow.     15:36pm: SS notified by Physician advisor that peer to peer was denied, denial upheld. SS notified by  inpatient rehab per Liudmila that insurance has offered an appeal. Pt's dtr was notified. Dtr plans to do appeal on 06/13/25 by calling [phone]. SS faxed clinical updates to appeal dept at fax [phone]. Pt's dtr requested SS meet her in Pt's room on 06/13/25 for assistance with calling appeal. SS to follow.                    Continued Care and Services - Admitted Since 6/7/2025       Destination       Service Provider Request Status Services Address Phone Fax Patient Preferred    [facility] Peer to Peer completed, denial upheld -- [address] [place] [address] [phone] --                    KARLY Robertson

## 2025-06-12 NOTE — PROGRESS NOTES
Interval History:     Tiara is a an 80-year-old female who is postoperative day #3 after undergoing a reverse right total shoulder arthroplasty due to fracture.  She is currently sitting up in the recliner at bedside with her abductor sling in place and reports that she is experiencing fairly mild to moderate pain.  She states that the right foot is not giving her any complaints at this time.  It was noted by nursing staff that the patient did have a slight drop in her O2 saturation and was placed on 1 L nasal cannula while sleeping.  Outside of this no other acute events were noted at night. Patient currently denies any acute distress, chest pain, or shortness of air.        Objective     Vital Signs  Temp:  [97.5 °F (36.4 °C)-98.5 °F (36.9 °C)] 97.7 °F (36.5 °C)  Heart Rate:  [104-106] 104  Resp:  [16-20] 16  BP: (125-172)/(59-70) 129/59    Labs & Rads  Lab Results (last 72 hours)       Procedure Component Value Units Date/Time    Basic Metabolic Panel [[id number]]  (Abnormal) Collected: 06/12/25 0201    Specimen: Blood Updated: 06/12/25 0245     Glucose 84 mg/dL      BUN 21.1 mg/dL      Creatinine 0.97 mg/dL      Sodium 143 mmol/L      Potassium 4.4 mmol/L      Chloride 115 mmol/L      CO2 19.6 mmol/L      Calcium 7.8 mg/dL      BUN/Creatinine Ratio 21.8     Anion Gap 8.4 mmol/L      eGFR 59.2 mL/min/1.73     Narrative:      GFR Categories in Chronic Kidney Disease (CKD)              GFR Category          GFR (mL/min/1.73)    Interpretation  G1                    90 or greater        Normal or high (1)  G2                    60-89                Mild decrease (1)  G3a                   45-59                Mild to moderate decrease  G3b                   30-44                Moderate to severe decrease  G4                    15-29                Severe decrease  G5                    14 or less           Kidney failure    (1)In the absence of evidence of kidney disease, neither GFR category G1 or G2 fulfill the  criteria for CKD.    eGFR calculation 2021 CKD-EPI creatinine equation, which does not include race as a factor    CBC & Differential [[id number]]  (Abnormal) Collected: 06/12/25 0201    Specimen: Blood Updated: 06/12/25 0215    Narrative:      The following orders were created for panel order CBC & Differential.  Procedure                               Abnormality         Status                     ---------                               -----------         ------                     CBC Auto Differential[[id number]]        Abnormal            Final result               Scan Slide[[id number]]                                                                    Please view results for these tests on the individual orders.    CBC Auto Differential [[id number]]  (Abnormal) Collected: 06/12/25 0201    Specimen: Blood Updated: 06/12/25 0215     WBC 11.22 10*3/mm3      RBC 2.54 10*6/mm3      Hemoglobin 8.2 g/dL      Hematocrit 27.1 %      .7 fL      MCH 32.3 pg      MCHC 30.3 g/dL      RDW 13.9 %      RDW-SD 54.4 fl      MPV 10.9 fL      Platelets 193 10*3/mm3      Neutrophil % 63.7 %      Lymphocyte % 23.6 %      Monocyte % 8.5 %      Eosinophil % 2.5 %      Basophil % 0.5 %      Immature Grans % 1.2 %      Neutrophils, Absolute 7.15 10*3/mm3      Lymphocytes, Absolute 2.65 10*3/mm3      Monocytes, Absolute 0.95 10*3/mm3      Eosinophils, Absolute 0.28 10*3/mm3      Basophils, Absolute 0.06 10*3/mm3      Immature Grans, Absolute 0.13 10*3/mm3      nRBC 0.0 /100 WBC     Urine Culture - Urine, Urine, Catheter [[id number]]  (Normal) Collected: 06/10/25 1448    Specimen: Urine, Catheter Updated: 06/11/25 2044     Urine Culture No growth    Comprehensive Metabolic Panel [[id number]]  (Abnormal) Collected: 06/11/25 0833    Specimen: Blood Updated: 06/11/25 0905     Glucose 99 mg/dL      BUN 28.9 mg/dL      Creatinine 1.17 mg/dL      Sodium 142 mmol/L      Potassium 3.9 mmol/L      Comment: Specimen hemolyzed.  Result may be  falsely elevated.        Chloride 112 mmol/L      CO2 20.5 mmol/L      Calcium 8.1 mg/dL      Total Protein 5.9 g/dL      Albumin 2.9 g/dL      ALT (SGPT) 14 U/L      AST (SGOT) 55 U/L      Alkaline Phosphatase 54 U/L      Total Bilirubin 0.4 mg/dL      Globulin 3.0 gm/dL      A/G Ratio 1.0 g/dL      BUN/Creatinine Ratio 24.7     Anion Gap 9.5 mmol/L      eGFR 47.3 mL/min/1.73     Narrative:      GFR Categories in Chronic Kidney Disease (CKD)              GFR Category          GFR (mL/min/1.73)    Interpretation  G1                    90 or greater        Normal or high (1)  G2                    60-89                Mild decrease (1)  G3a                   45-59                Mild to moderate decrease  G3b                   30-44                Moderate to severe decrease  G4                    15-29                Severe decrease  G5                    14 or less           Kidney failure    (1)In the absence of evidence of kidney disease, neither GFR category G1 or G2 fulfill the criteria for CKD.    eGFR calculation 2021 CKD-EPI creatinine equation, which does not include race as a factor    CBC & Differential [[id number]]  (Abnormal) Collected: 06/11/25 0833    Specimen: Blood Updated: 06/11/25 0851    Narrative:      The following orders were created for panel order CBC & Differential.  Procedure                               Abnormality         Status                     ---------                               -----------         ------                     CBC Auto Differential[[id number]]        Abnormal            Final result               Scan Slide[[id number]]                                                                    Please view results for these tests on the individual orders.    CBC Auto Differential [[id number]]  (Abnormal) Collected: 06/11/25 0833    Specimen: Blood Updated: 06/11/25 0850     WBC 14.23 10*3/mm3      RBC 2.87 10*6/mm3      Hemoglobin 9.3 g/dL      Hematocrit 31.1 %      .4  fL      MCH 32.4 pg      MCHC 29.9 g/dL      RDW 13.8 %      RDW-SD 55.7 fl      MPV 11.4 fL      Platelets 194 10*3/mm3      Neutrophil % 69.4 %      Lymphocyte % 20.1 %      Monocyte % 8.0 %      Eosinophil % 1.5 %      Basophil % 0.4 %      Immature Grans % 0.6 %      Neutrophils, Absolute 9.86 10*3/mm3      Lymphocytes, Absolute 2.86 10*3/mm3      Monocytes, Absolute 1.14 10*3/mm3      Eosinophils, Absolute 0.22 10*3/mm3      Basophils, Absolute 0.06 10*3/mm3      Immature Grans, Absolute 0.09 10*3/mm3      nRBC 0.0 /100 WBC     Urinalysis, Microscopic Only - Urine, Catheter [[id number]]  (Abnormal) Collected: 06/10/25 1448    Specimen: Urine, Catheter Updated: 06/10/25 1503     RBC, UA Too Numerous to Count /HPF      WBC, UA 21-50 /HPF      Bacteria, UA None Seen /HPF      Squamous Epithelial Cells, UA 3-6 /HPF      Hyaline Casts, UA 3-6 /LPF      Methodology Automated Microscopy    Urinalysis With Culture If Indicated - Urine, Catheter [[id number]]  (Abnormal) Collected: 06/10/25 1448    Specimen: Urine, Catheter Updated: 06/10/25 1503     Color, UA Yellow     Appearance, UA Clear     pH, UA 6.0     Specific Gravity, UA 1.019     Glucose, UA Negative     Ketones, UA Negative     Bilirubin, UA Negative     Blood, UA Moderate (2+)     Protein, UA Trace     Leuk Esterase, UA Moderate (2+)     Nitrite, UA Negative     Urobilinogen, UA 1.0 E.U./dL    Narrative:      In absence of clinical symptoms, the presence of pyuria, bacteria, and/or nitrites on the urinalysis result does not correlate with infection.    Hepatitis Panel, Acute [[id number]]  (Normal) Collected: 06/10/25 0259    Specimen: Blood Updated: 06/10/25 0359     Hepatitis B Surface Ag Non-Reactive     Hep A IgM Non-Reactive     Hep B C IgM Non-Reactive     Hepatitis C Ab Non-Reactive    Narrative:      Results may be falsely decreased if patient taking Biotin.     Comprehensive Metabolic Panel [[id number]]  (Abnormal) Collected: 06/10/25 0259    Specimen:  Blood Updated: 06/10/25 0354     Glucose 121 mg/dL      BUN 24.9 mg/dL      Creatinine 1.27 mg/dL      Sodium 137 mmol/L      Potassium 4.0 mmol/L      Chloride 107 mmol/L      CO2 18.3 mmol/L      Calcium 8.0 mg/dL      Total Protein 5.9 g/dL      Albumin 3.1 g/dL      ALT (SGPT) 18 U/L      AST (SGOT) 42 U/L      Alkaline Phosphatase 46 U/L      Total Bilirubin 0.4 mg/dL      Globulin 2.8 gm/dL      A/G Ratio 1.1 g/dL      BUN/Creatinine Ratio 19.6     Anion Gap 11.7 mmol/L      eGFR 42.8 mL/min/1.73     Narrative:      GFR Categories in Chronic Kidney Disease (CKD)              GFR Category          GFR (mL/min/1.73)    Interpretation  G1                    90 or greater        Normal or high (1)  G2                    60-89                Mild decrease (1)  G3a                   45-59                Mild to moderate decrease  G3b                   30-44                Moderate to severe decrease  G4                    15-29                Severe decrease  G5                    14 or less           Kidney failure    (1)In the absence of evidence of kidney disease, neither GFR category G1 or G2 fulfill the criteria for CKD.    eGFR calculation 2021 CKD-EPI creatinine equation, which does not include race as a factor    CBC & Differential [[id number]]  (Abnormal) Collected: 06/10/25 0259    Specimen: Blood Updated: 06/10/25 0337    Narrative:      The following orders were created for panel order CBC & Differential.  Procedure                               Abnormality         Status                     ---------                               -----------         ------                     CBC Auto Differential[[id number]]        Abnormal            Final result                 Please view results for these tests on the individual orders.    CBC Auto Differential [[id number]]  (Abnormal) Collected: 06/10/25 0259    Specimen: Blood Updated: 06/10/25 0337     WBC 20.69 10*3/mm3      RBC 3.09 10*6/mm3      Hemoglobin 10.0  g/dL      Hematocrit 31.9 %      .2 fL      MCH 32.4 pg      MCHC 31.3 g/dL      RDW 13.8 %      RDW-SD 51.9 fl      MPV 11.1 fL      Platelets 162 10*3/mm3      Neutrophil % 77.3 %      Lymphocyte % 9.8 %      Monocyte % 12.1 %      Eosinophil % 0.0 %      Basophil % 0.2 %      Immature Grans % 0.6 %      Neutrophils, Absolute 16.00 10*3/mm3      Lymphocytes, Absolute 2.02 10*3/mm3      Monocytes, Absolute 2.50 10*3/mm3      Eosinophils, Absolute 0.00 10*3/mm3      Basophils, Absolute 0.04 10*3/mm3      Immature Grans, Absolute 0.13 10*3/mm3      nRBC 0.0 /100 WBC           Imaging Results (Last 72 Hours)       Procedure Component Value Units Date/Time    XR Chest 1 View [[id number]] Collected: 06/10/25 1503     Updated: 06/10/25 1509    Narrative:      EXAM:    XR Chest, 1 View     EXAM DATE:    6/10/2025 3:03 PM     CLINICAL HISTORY:    SIRS; S42.91XA-Fracture of right shoulder girdle, part unspecified,  initial encounter for closed fracture; S92.901A-Unspecified fracture of  right foot, initial encounter for closed fracture; M25.571-Pain in right  ankle and joints of right foot; Z98.890-Other specified postprocedural  states     TECHNIQUE:    Frontal view of the chest.     COMPARISON:    6/7/2025     FINDINGS:    Lungs and pleural spaces:  Right lower lobe airspace disease.  No  consolidation.  No pneumothorax.    Heart:  Unremarkable as visualized.  No cardiomegaly.    Mediastinum:  Unremarkable as visualized.  Normal mediastinal contour.    Bones/joints:  Unremarkable as visualized.  No acute fracture.       Impression:        Right lower lobe airspace disease.     This report was finalized on 6/10/2025 3:07 PM by Dr. Aime Haywood MD.       XR Shoulder 2+ View Right [[id number]] Collected: 06/09/25 1636     Updated: 06/09/25 1639    Narrative:      EXAM:    XR Right Shoulder Complete, 2 or More Views     EXAM DATE:    6/9/2025 4:36 PM     CLINICAL HISTORY:    Post-Op 1 view AP only; S42.91XA-Fracture of  right shoulder girdle,  part unspecified, initial encounter for closed fracture;  S92.901A-Unspecified fracture of right foot, initial encounter for  closed fracture; M25.571-Pain in right ankle and joints of right foot;  Z98.890-Other specified postprocedural states     TECHNIQUE:    Two or more views of the right shoulder.     COMPARISON:    No relevant prior studies available.     FINDINGS:    Bones/joints:  Right total shoulder arthroplasty.  Components appear  well seated.  No acute fracture.  No dislocation.    Soft tissues:  Unremarkable as visualized.       Impression:        No acute findings in the right shoulder.     This report was finalized on 6/9/2025 4:37 PM by Dr. Aime Haywood MD.                   Physical Exam:  Constitutional: Patient is alert and oriented to person and place.  Patient is confused as to the date but is able to be reoriented.  No acute distress noted.    Musculoskeletal: Upon examination of the right shoulder there is an abductor sling noted to the right upper extremity.  There is a bulky surgical dressing noted to the anterior shoulder.  No evidence of edema, ecchymosis, erythema, active drainage, or signs of an infectious process.  Patient sensation is intact to light touch with brisk capillary refill.  Patient is able to flex and extend all digits of the right hand without complication.  There is a 2/4 palpable pulse radially.    Upon examination of the right foot there is fading ecchymosis noted laterally.  No evidence of edema, erythema, wounds, drainage, or signs of an infectious process.  Patient sensation is intact to light touch with brisk capillary refill.  Patient is able to flex and extend all digits of the right foot as well as plantar and dorsiflex at the ankle.  There is a palpable pulse distally.  CAM boot reapplied.         Assessment:  S/p reverse right total shoulder arthroplasty  Right 5th metatarsal fracture        Plan:  Continue with pain control.  Activity  as tolerated with pain as the guide.     PT/OT: Patient is to remain in the sling to the right upper extremity.  Patient should refrain from any heavy lifting, pushing, pulling of the right upper extremity.     Dressing: Maintain the surgical dressing at this time.      CAM boot to the right lower extremity and weight bear as tolerated.      DVT/PPx: Patient is currently on  heparin 5000 units subcutaneous 3 times daily     Patient is to follow-up in the orthopedic office in 2 weeks with Dr. Carlin for repeat imaging and reevaluation of the right shoulder. Patient may return sooner for any worsening concerns and or complaints.    For any questions please feel free to reach out or notify orthopedics.        Discharge planning to be determined per hospitalist.        DIEGO Kent  06/12/25  13:09 EDT

## 2025-06-13 PROCEDURE — 63710000001 PREDNISONE PER 5 MG: Performed by: ORTHOPAEDIC SURGERY

## 2025-06-13 PROCEDURE — 25010000002 DOXYCYCLINE 100 MG RECONSTITUTED SOLUTION 1 EACH VIAL: Performed by: INTERNAL MEDICINE

## 2025-06-13 PROCEDURE — 99231 SBSQ HOSP IP/OBS SF/LOW 25: CPT | Performed by: INTERNAL MEDICINE

## 2025-06-13 PROCEDURE — 25010000002 HEPARIN (PORCINE) PER 1000 UNITS: Performed by: INTERNAL MEDICINE

## 2025-06-13 PROCEDURE — 97110 THERAPEUTIC EXERCISES: CPT

## 2025-06-13 PROCEDURE — 25010000002 CEFTRIAXONE PER 250 MG: Performed by: INTERNAL MEDICINE

## 2025-06-13 PROCEDURE — 97535 SELF CARE MNGMENT TRAINING: CPT

## 2025-06-13 PROCEDURE — 97530 THERAPEUTIC ACTIVITIES: CPT

## 2025-06-13 RX ADMIN — GABAPENTIN 400 MG: 400 CAPSULE ORAL at 20:36

## 2025-06-13 RX ADMIN — LOSARTAN POTASSIUM 100 MG: 50 TABLET, FILM COATED ORAL at 09:13

## 2025-06-13 RX ADMIN — HEPARIN SODIUM 5000 UNITS: 5000 INJECTION INTRAVENOUS; SUBCUTANEOUS at 05:42

## 2025-06-13 RX ADMIN — HEPARIN SODIUM 5000 UNITS: 5000 INJECTION INTRAVENOUS; SUBCUTANEOUS at 20:36

## 2025-06-13 RX ADMIN — GABAPENTIN 400 MG: 400 CAPSULE ORAL at 15:07

## 2025-06-13 RX ADMIN — FAMOTIDINE 40 MG: 20 TABLET, FILM COATED ORAL at 09:13

## 2025-06-13 RX ADMIN — PREDNISONE 4 MG: 1 TABLET ORAL at 09:13

## 2025-06-13 RX ADMIN — DOXYCYCLINE 100 MG: 100 INJECTION, POWDER, LYOPHILIZED, FOR SOLUTION INTRAVENOUS at 04:22

## 2025-06-13 RX ADMIN — SENNOSIDES, DOCUSATE SODIUM 2 TABLET: 50; 8.6 TABLET, FILM COATED ORAL at 09:13

## 2025-06-13 RX ADMIN — OXYCODONE AND ACETAMINOPHEN 2 TABLET: 7.5; 325 TABLET ORAL at 20:36

## 2025-06-13 RX ADMIN — Medication 10 ML: at 20:36

## 2025-06-13 RX ADMIN — DOXYCYCLINE 100 MG: 100 INJECTION, POWDER, LYOPHILIZED, FOR SOLUTION INTRAVENOUS at 15:06

## 2025-06-13 RX ADMIN — OXYCODONE AND ACETAMINOPHEN 2 TABLET: 7.5; 325 TABLET ORAL at 09:12

## 2025-06-13 RX ADMIN — Medication 10 ML: at 09:13

## 2025-06-13 RX ADMIN — SENNOSIDES, DOCUSATE SODIUM 2 TABLET: 50; 8.6 TABLET, FILM COATED ORAL at 20:36

## 2025-06-13 RX ADMIN — VALACYCLOVIR HYDROCHLORIDE 500 MG: 500 TABLET, FILM COATED ORAL at 09:13

## 2025-06-13 RX ADMIN — METOPROLOL SUCCINATE 25 MG: 25 TABLET, EXTENDED RELEASE ORAL at 09:13

## 2025-06-13 RX ADMIN — CYCLOBENZAPRINE 10 MG: 10 TABLET, FILM COATED ORAL at 20:36

## 2025-06-13 RX ADMIN — CEFTRIAXONE 1000 MG: 1 INJECTION, POWDER, FOR SOLUTION INTRAMUSCULAR; INTRAVENOUS at 15:06

## 2025-06-13 RX ADMIN — Medication 2000 UNITS: at 09:13

## 2025-06-13 RX ADMIN — GABAPENTIN 400 MG: 400 CAPSULE ORAL at 09:13

## 2025-06-13 RX ADMIN — HEPARIN SODIUM 5000 UNITS: 5000 INJECTION INTRAVENOUS; SUBCUTANEOUS at 15:07

## 2025-06-13 RX ADMIN — ROSUVASTATIN 20 MG: 20 TABLET, FILM COATED ORAL at 09:13

## 2025-06-13 RX ADMIN — FOLIC ACID 1000 MCG: 1 TABLET ORAL at 09:13

## 2025-06-13 NOTE — PROGRESS NOTES
[facility] HOSPITALIST PROGRESS NOTE     Patient Identification:  Name:  Tiara Stubbs  Age:  80 y.o.  Sex:  female  :  1945  MRN:  [id number]  Visit Number:  [id number]  ROOM: [address] [place] [address]     Primary Care Provider:  Paula Downey APRN    Length of stay in inpatient status:  6    Subjective     Chief Compliant:  No chief complaint on file.      History of Presenting Illness:    Patient was slightly confused on my examination but she had just woke up form a nap. She noted bad dreams and confusion improved as I examined her. She denied any new complaints. Reported working with PT/OT today.     ROS:  Otherwise 10 point ROS negative other than documented above in HPI.     Objective     Current Hospital Meds:cefTRIAXone, 1,000 mg, Intravenous, Q24H  cholecalciferol, 2,000 Units, Oral, Daily  cyclobenzaprine, 10 mg, Oral, Nightly  doxycycline, 100 mg, Intravenous, Q12H  famotidine, 40 mg, Oral, Daily  folic acid, 1,000 mcg, Oral, Daily  gabapentin, 400 mg, Oral, TID  heparin (porcine), 5,000 Units, Subcutaneous, Q8H  losartan, 100 mg, Oral, Q24H  metoprolol succinate XL, 25 mg, Oral, Daily  predniSONE, 4 mg, Oral, Daily  rosuvastatin, 20 mg, Oral, Daily  senna-docusate sodium, 2 tablet, Oral, BID  sodium chloride, 10 mL, Intravenous, Q12H  valACYclovir, 500 mg, Oral, Daily         Current Antimicrobial Therapy:  Anti-Infectives (From admission, onward)      Ordered     Dose/Rate Route Frequency Start Stop    06/10/25 1527  cefTRIAXone (ROCEPHIN) 1,000 mg in sodium chloride 0.9 % 100 mL IVPB-VTB        Ordering Provider: Simone Coelho MD    1,000 mg  200 mL/hr over 30 Minutes Intravenous Every 24 Hours 06/10/25 1600 06/15/25 1559    06/10/25 1527  doxycycline (VIBRAMYCIN) 100 mg in sodium chloride 0.9 % 100 mL IVPB-VTB        Ordering Provider: Simone Coelho MD    100 mg Intravenous Every 12 Hours 06/10/25 1600 06/15/25 1559    25 1709  ceFAZolin 2000 mg IVPB in 100 mL NS (VTB)         Ordering Provider: Ameya Carlin MD    2,000 mg  over 30 Minutes Intravenous Every 8 Hours 06/09/25 2300 06/10/25 0646    06/09/25 1219  sodium chloride 3,000 mL with polymyxin B 1,500,000 Units, vancomycin 3,000 mg irrigation        Ordering Provider: Ameya Carlin MD     Irrigation Once 06/09/25 1230 06/09/25 1332    06/09/25 1050  ceFAZolin 2000 mg IVPB in 100 mL NS (VTB)        Ordering Provider: Ameya Carlin MD    2,000 mg  over 30 Minutes Intravenous Once 06/09/25 1052 06/09/25 1524    06/07/25 2021  valACYclovir (VALTREX) tablet 500 mg        Ordering Provider: Ameya Carlin MD    500 mg Oral Daily 06/08/25 0900 06/08/26 0859          Current Diuretic Therapy:  Diuretics (From admission, onward)      None          ----------------------------------------------------------------------------------------------------------------------  Vital Signs:  Temp:  [97.5 °F (36.4 °C)-98.6 °F (37 °C)] 97.7 °F (36.5 °C)  Heart Rate:  [106-107] 106  Resp:  [16-18] 18  BP: (111-149)/(59-87) 134/87     on   ;   Device (Oxygen Therapy): room air  Body mass index is 30.16 kg/m².    Wt Readings from Last 3 Encounters:   06/10/25 87.4 kg (192 lb 9.6 oz)   12/05/24 89 kg (196 lb 3.2 oz)   05/23/24 88.8 kg (195 lb 12.8 oz)     Intake & Output (last 3 days)         06/10 0701  06/11 0700 06/11 0701  06/12 0700 06/12 0701  06/13 0700 06/13 0701 06/14 0700    P.O. 1400 1140 1200 480    I.V. (mL/kg) 75.3 (0.9) 701.4 (8) 198 (2.3)     IV Piggyback        Total Intake(mL/kg) 1475.3 (16.9) 1841.4 (21.1) 1398 (16) 480 (5.5)    Urine (mL/kg/hr) 1250 (0.6) 2800 (1.3) 2750 (1.3) 550 (0.5)    Stool   0 0    Total Output 1250 2800 2750 550    Net +225.3 -958.6 -1352 -70            Urine Unmeasured Occurrence   2 x 1 x    Stool Unmeasured Occurrence   1 x 1 x          Diet: Regular/House; Fluid Consistency: Thin (IDDSI  0)  ----------------------------------------------------------------------------------------------------------------------  Physical exam:  Constitutional:  Elderly appearing male.   HENT:  Head:  Normocephalic and atraumatic.  Mouth:  Moist mucous membranes.    Eyes:  Conjunctivae and EOM are normal. No scleral icterus.    Neck:  Neck supple.  No JVD present.    Cardiovascular:  Normal rate, regular rhythm and normal heart sounds with no murmur.  Pulmonary/Chest:  No respiratory distress, no wheezes, no crackles, with normal breath sounds and good air movement.  Abdominal:  Soft.  Bowel sounds are normal.  No distension and no tenderness.   Musculoskeletal:  R shoulder bandage in place.   Neurological:  Alert and oriented to person, place, but not time.  No cranial nerve deficit.  No tongue deviation.  No facial droop.  No slurred speech.   Skin:  Skin is warm and dry. No rash noted. No pallor.   Peripheral vascular:  Pulses in all 4 extremities with no clubbing, no cyanosis, no edema.  ----------------------------------------------------------------------------------------------------------------------  Tele:    ----------------------------------------------------------------------------------------------------------------------  Results from last 7 days   Lab Units 06/12/25  0201 06/11/25  0833 06/10/25  0259 06/09/25  0722 06/09/25  0539   WBC 10*3/mm3 11.22* 14.23* 20.69*   < >  --    HEMOGLOBIN g/dL 8.2* 9.3* 10.0*   < >  --    HEMATOCRIT % 27.1* 31.1* 31.9*   < >  --    MCV fL 106.7* 108.4* 103.2*   < >  --    MCHC g/dL 30.3* 29.9* 31.3*   < >  --    PLATELETS 10*3/mm3 193 194 162   < >  --    INR   --   --   --   --  1.04    < > = values in this interval not displayed.         Results from last 7 days   Lab Units 06/12/25  0201 06/11/25  0833 06/10/25  0259 06/09/25  0722 06/07/25  1120   SODIUM mmol/L 143 142 137 137 143   POTASSIUM mmol/L 4.4 3.9 4.0 4.1 3.9   MAGNESIUM mg/dL  --   --   --  2.4  --   "  CHLORIDE mmol/L 115* 112* 107 105 108*   CO2 mmol/L 19.6* 20.5* 18.3* 18.9* 22.9   BUN mg/dL 21.1 28.9* 24.9* 16.7 19.5   CREATININE mg/dL 0.97 1.17* 1.27* 0.78 0.83   CALCIUM mg/dL 7.8* 8.1* 8.0* 8.8 8.7   GLUCOSE mg/dL 84 99 121* 100* 103*   ALBUMIN g/dL  --  2.9* 3.1*  --  3.8   BILIRUBIN mg/dL  --  0.4 0.4  --  0.6   ALK PHOS U/L  --  54 46  --  60   AST (SGOT) U/L  --  55* 42*  --  87*   ALT (SGPT) U/L  --  14 18  --  49*   Estimated Creatinine Clearance: 52.5 mL/min (by C-G formula based on SCr of 0.97 mg/dL).  No results found for: \"AMMONIA\"              No results found for: \"HGBA1C\", \"POCGLU\"  No results found for: \"TSH\", \"FREET4\"  No results found for: \"PREGTESTUR\", \"PREGSERUM\", \"HCG\", \"HCGQUANT\"  Pain Management Panel           No data to display              Brief Urine Lab Results  (Last result in the past 365 days)        Color   Clarity   Blood   Leuk Est   Nitrite   Protein   CREAT   Urine HCG        06/10/25 1448 Yellow   Clear   Moderate (2+)   Moderate (2+)   Negative   Trace                 No results found for: \"BLOODCX\"  Urine Culture   Date Value Ref Range Status   06/10/2025 No growth  Final     No results found for: \"WOUNDCX\"  No results found for: \"STOOLCX\"  No results found for: \"RESPCX\"  No results found for: \"AFBCX\"        I have personally looked at the labs and they are summarized above.  ----------------------------------------------------------------------------------------------------------------------  Detailed radiology reports for the last 24 hours:    Imaging Results (Last 24 Hours)       ** No results found for the last 24 hours. **          Assessment & Plan    #Right humerus fracture and right shoulder dislocation secondary to mechanical ground level fall  #Right fifth metatarsal fracture  #Hyperlipidemia - continue rosuvastatin  #RA  #Osteoarthritis  #Lupus  #Hypertension - BP well controlled. Continue home metoprolol and losartan.  #GERD - continue " famotidine  #VERONIQUE  #Leukocytosis   #Human rhinovirus   #Mild hepatocellular transaminitis - Normalized  #MAURO - Suspect prerenal in post operative period, improved with IVF and improved intake.   #Hospital delirium  #RLL pneumonia    #Mild postoperative anemia.      POD#4  total reverse shoulder arthroplasty. Surgery plans nonoperatively management of metatarsal fracture with boot and WBAT. Repeat labs in AM. PRN zofran. PRN pain control. PT/OT to evaluate today. Patient with RLL infiltrate on chest x-ray, possibly aspiration in perioperative period? Will treat as aspiration pneumonia, ceftriaxone/doxy. Day 4/5. WBC count improving. Mentation overall improving.     Repeat labs in AM.      Code status: Full      Pending Inpatient rehab consulted. Short term SNF if declined. Medically stable for placement.        Simone Coelho MD  [facility] Hospitalist  06/13/25  18:31 EDT

## 2025-06-13 NOTE — DISCHARGE PLACEMENT REQUEST
"Tiara Stubbs (80 y.o. Female)       Date of Birth   1945    Social Security Number       Address   [address]  [facility] [address]    Home Phone   [phone]    MRN   [id number]       [facility]    Marital Status                               Admission Date   6/7/2025    Admission Type   Urgent    Admitting Provider   Simone Coelho MD    Attending Provider   Simone Coelho MD    Department, Room/Bed   [address], 3337/1P       Discharge Date       Discharge Disposition       Discharge Destination                                 Attending Provider: Simone Coelho MD    Allergies: Codeine, Stadol [Butorphanol], Vistaril [Hydroxyzine Hcl]    Isolation: None   Infection: None   Code Status: CPR    Ht: 170.2 cm (67.01\")   Wt: 87.4 kg (192 lb 9.6 oz)    Admission Cmt: None   Principal Problem: Shoulder fracture, right, closed, initial encounter [S42.91XA]                   Active Insurance as of 6/7/2025       Primary Coverage       Payor Plan Insurance Group Employer/Plan Group    HU[employer] MEDICARE REPLACEMENT HU[employer] MEDICARE ADVANTAGE PPO [id number]       Payor Plan Address Payor Plan Phone Number Payor Plan Fax Number Effective Dates    [address] [address] [phone]  1/1/2025 - None Entered    [facility] [address]         Subscriber Name Subscriber Birth Date Member ID       TIARA STUBBS 1945 [id number]                     Emergency Contacts        (Rel.) Home Phone Work Phone Mobile Phone    Keya Quinonez (Daughter) [phone] -- --                 History & Physical        Tico Mckoy PA-C at 06/07/25 1022       Attestation signed by Amanda Pineda DO at 06/07/25 1630      I have evaluated the patient independently, I have reviewed H&P, all labs and image reports from today and evaluated the patient at bedside.  I have discussed with Tico Mckoy PA-C and agree.  Patient's case and images were discussed with Dr." Tesfaye by the outside facility. Initial recommendation was for patient to transfer to [facility] so that the surgery could be done this weekend, but patient refused multiple times per my discussion with the ER provider, and requested transfer to our facility. Due to the needed equipment having to be brought to our facility it will likely be Monday or Tuesday before she is able to have surgery done.                          [facility]  History & Physical    Patient Identification:  Name:  Tiara Stubbs  Age:  80 y.o.  Sex:  female  :  1945  MRN:  [id number]   Visit Number:  [id number]  Admit Date: 2025   Primary Care Physician:  Paula Downey APRN    Subjective     Chief complaint: Transfer from OSH due to fall, fractures    History of presenting illness:      Tiara Stubbs is a 80 y.o. female who presented for further evaluation and management of multiple fractures s/p fall.  Per report from transferring facility patient fell from standing height and on OSH ED workup was found to have a right humeral fracture as well as right shoulder dislocation and an ankle fracture.  She was seen and examined on 3S with family at the bedside. She is generally comfortable appearing but does have intermittent pain with noted grimacing. She states had a mechanical fall when standing from the toilet though did not have any preceding dizziness. She landed on her right side. She did hit her head, just lateral to the right eye and has bruising. She and family state CT head was completed at OSH and negative. She states normally doesn't have any trouble ambulating at home, doesn't require walker or cane. She does live with her daughter and EMILY.   She reports she has otherwise been feeling okay prior to the fall. Has had recent issues with pneumonia/bronchitis but reported has completed treatment with no SOA or cough reported today. She denies any chest pain, no  history of heart disease. She does take daily ASA at the direction of her rheumatologist. She denies any abdominal pain, nausea, vomiting, diarrhea. She is not having difficulty urinating.   She has history of RA- historically on methotrexate but only taking prednisone currently.     Past medical history is significant for RA, osteoarthritis, hypertension, lupus, hyperlipidemia, GERD      Known Emergency Department medications received prior to my evaluation included morphine.   Room location at the time of my evaluation was 304b.     ---------------------------------------------------------------------------------------------------------------------   Review of Systems   Constitutional:  Negative for chills and fever.   HENT:  Negative for congestion and rhinorrhea.    Respiratory:  Negative for cough and shortness of breath.    Cardiovascular:  Negative for chest pain and leg swelling.   Gastrointestinal:  Negative for abdominal pain, diarrhea, nausea and vomiting.   Genitourinary:  Negative for difficulty urinating and dysuria.   Musculoskeletal:  Positive for arthralgias and myalgias.   Skin:  Negative for rash and wound.   Neurological:  Negative for dizziness and light-headedness.        ---------------------------------------------------------------------------------------------------------------------   Past Medical History:   Diagnosis Date    Acid reflux     Fibromyalgia     High cholesterol     Hx: recurrent pneumonia     Hypertension     Lupus     Osteoarthritis     Rheumatoid arthritis      Past Surgical History:   Procedure Laterality Date    CARPAL TUNNEL RELEASE Bilateral     KNEE SURGERY Right     TOTAL WRIST ARTHROPLASTY Right      Family History   Problem Relation Age of Onset    Heart disease Mother     Liver cancer Mother     Heart disease Father     Breast cancer Neg Hx      Social History     Socioeconomic History    Marital status:    Tobacco Use    Smoking status: Never     Passive  exposure: Never    Smokeless tobacco: Never   Vaping Use    Vaping status: Never Used   Substance and Sexual Activity    Alcohol use: Never    Drug use: Never    Sexual activity: Defer     ---------------------------------------------------------------------------------------------------------------------   Allergies:  Codeine, Stadol [butorphanol], and Vistaril [hydroxyzine hcl]  ---------------------------------------------------------------------------------------------------------------------   Home medications:    Medications below are reported home medications pulling from within the system; at this time, these medications have not been reconciled unless otherwise specified and are in the verification process for further verifcation as current home medications.  Medications Prior to Admission   Medication Sig Dispense Refill Last Dose/Taking    albuterol (ACCUNEB) 1.25 MG/3ML nebulizer solution 3 mL.       aspirin 81 MG EC tablet Take 1 tablet by mouth Daily.       azelastine (ASTELIN) 0.1 % nasal spray 1 spray 2 (Two) Times a Day.       benzonatate (TESSALON) 100 MG capsule 1 capsule.       Budeson-Glycopyrrol-Formoterol (BREZTRI) 160-9-4.8 MCG/ACT aerosol inhaler 2 (Two) Times a Day.       Cholecalciferol 50 MCG (2000 UT) tablet Daily.       cyclobenzaprine (FLEXERIL) 10 MG tablet        Diclofenac Sodium (VOLTAREN) 1 % gel gel APPLY 4 GRAMS TOPICALLY FOUR TIMES DAILY AS NEEDED FOR PAIN. APPLY TO A SINGLE KNEE ANKLE FOOT (FOR FOOT INCLUDES SOLE TOES TOP OF FOOT       famotidine (PEPCID) 40 MG tablet        fluticasone (FLONASE) 50 MCG/ACT nasal spray        folic acid (FOLVITE) 1 MG tablet Take one tablet every day except mondays       gabapentin (NEURONTIN) 400 MG capsule        HYDROcodone-acetaminophen (NORCO)  MG per tablet TAKE 1 TABLET BY MOUTH FOUR TIMES DAILY AS NEEDED FOR PAIN (DO NOT FILL UNTIL ON OR AFTER 09/26/21)       ibandronate (BONIVA) 150 MG tablet TAKE 1 TABLET BY MOUTH ONCE every  MONTH       lactulose (CHRONULAC) 10 GM/15ML solution        methotrexate 2.5 MG tablet Take 8 tablets by mouth.       metoprolol succinate XL (TOPROL-XL) 25 MG 24 hr tablet        olmesartan (BENICAR) 40 MG tablet TAKE 1 TABLET BY MOUTH ONCE DAILY AS DIRECTED Dose Increase       predniSONE (DELTASONE) 1 MG tablet 4 tablets.       rosuvastatin (CRESTOR) 20 MG tablet 1 tablet.       valACYclovir (VALTREX) 500 MG tablet 1 tablet.          Hospital Scheduled Meds:  senna-docusate sodium, 2 tablet, Oral, BID  sodium chloride, 10 mL, Intravenous, Q12H           Current listed hospital scheduled medications may not yet reflect those currently placed in orders that are signed and held awaiting patient's arrival to floor.   ---------------------------------------------------------------------------------------------------------------------     Objective     Vital Signs:     There were no vitals filed for this visit.  There is no height or weight on file to calculate BMI.  ---------------------------------------------------------------------------------------------------------------------       Physical Exam  Vitals and nursing note reviewed.   Constitutional:       General: She is not in acute distress.  HENT:      Head: Normocephalic.   Eyes:      Extraocular Movements: Extraocular movements intact.      Comments: There is bruising just lateral of her right eye   Cardiovascular:      Rate and Rhythm: Normal rate and regular rhythm.   Pulmonary:      Effort: Pulmonary effort is normal. No respiratory distress.   Abdominal:      Palpations: Abdomen is soft.   Musculoskeletal:      Left lower leg: No edema.      Comments: Splint is in place to the right lower extremity  Sling in place to right upper extremity   Skin:     General: Skin is warm and dry.   Neurological:      Mental Status: She is alert. Mental status is at baseline.   Psychiatric:         Mood and Affect: Mood normal.  "            ---------------------------------------------------------------------------------------------------------------------  EKG:      ---------------------------------------------------------------------------------------------------------------------                 Invalid input(s): \"PROT\"CrCl cannot be calculated (No successful lab value found.).  No results found for: \"AMMONIA\"          No results found for: \"HGBA1C\"  No results found for: \"TSH\", \"FREET4\"  No results found for: \"PREGTESTUR\", \"PREGSERUM\", \"HCG\", \"HCGQUANT\"  Pain Management Panel           No data to display              No results found for: \"BLOODCX\"  No results found for: \"URINECX\"  No results found for: \"WOUNDCX\"  No results found for: \"STOOLCX\"      ---------------------------------------------------------------------------------------------------------------------  Imaging Results (Last 7 Days)       ** No results found for the last 168 hours. **            Cultures:  No results found for: \"BLOODCX\", \"URINECX\", \"WOUNDCX\", \"MRSACX\", \"RESPCX\", \"STOOLCX\"    Last echocardiogram:  Results for orders placed during the hospital encounter of 11/17/21    Adult Transthoracic Echo Complete W/ Cont if Necessary Per Protocol    Interpretation Summary  · Estimated left ventricular EF = 65% Left ventricular ejection fraction appears to be 61 - 65%. Left ventricular systolic function is normal.  · Left ventricular diastolic function was normal.  · Estimated right ventricular systolic pressure from tricuspid regurgitation is mildly elevated (35-45 mmHg).  · Moderate pulmonary hypertension is present.  · No pericardial effusion  · No prev echo          I have personally reviewed the above radiology images and read the final radiology report on 06/07/25  ---------------------------------------------------------------------------------------------------------------------  Assessment / Plan     Active Hospital Problems    Diagnosis  POA    **Shoulder " fracture, right, closed, initial encounter [S42.91XA]  Yes       ASSESSMENT/PLAN:    Mechanical fall, PTA  Right humerus fracture  Right shoulder dislocation  Right fifth metatarsal fracture  Patient presented from OSH for further management of multiple fractures s/p mechanical fall.  Per report patient has fractured right humerus with dislocated right shoulder and fractured her right fifth metatarsal.  Orthopedic surgery consulted for further assistance and recommendations, appreciated.  Per report plan will be for right shoulder replacement-will be delayed until Monday or Tuesday when surgical equipment can be relocated to this hospital as patient has refused transfer to [facility]  Admit to the telemetry unit  Continue pain regimen and adjust as needed  Will obtain preprocedure EKG  Basic laboratory workup is pending  She will benefit from PT OT postoperatively  May also require CM/SW assistance with discharge planning.    Chronic:  Hyperlipidemia  RA/osteoarthritis  Lupus  Hypertension  GERD  Continue home medication regimen as indicated once med rec is complete  Will continue to monitor vital signs closely    ----------  -DVT prophylaxis: SCDs  -Activity: Bedrest  -Expected length of stay: INPATIENT status due to the need for care which can only be reasonably provided in an hospital setting such as aggressive/expedited ancillary services and/or consultation services, the necessity for IV medications, close physician monitoring and/or the possible need for procedures.  In such, I feel patient’s risk for adverse outcomes and need for care warrant INPATIENT evaluation and predict the patient’s care encounter to likely last beyond 2 midnights.   -Disposition Pending further clinical course and progress with PT OT post op    High risk secondary to Fall with multiple fractures    There are no questions and answers to display.       Tico Mckoy PA-C   06/07/25  10:22 EDT     Electronically signed by  Amanda Pineda, DO at 06/07/25 1633       Vital Signs (last day)       Date/Time Temp Temp src Pulse Resp BP Patient Position SpO2    06/13/25 0700 97.5 (36.4) Oral 107 18 149/75 Lying --    06/13/25 0219 98.5 (36.9) Oral -- 16 111/59 Lying --    06/12/25 2005 98.6 (37) Oral -- 18 119/63 Lying --    06/12/25 1441 98.6 (37) Oral 110 16 124/59 Lying 96    06/12/25 0645 97.7 (36.5) Oral 104 16 129/59 Lying 98    06/12/25 0300 97.5 (36.4) Oral 106 20 172/70 Lying --          Intake & Output (last day)         06/12 0701  06/13 0700 06/13 0701 06/14 0700    P.O. 1200     I.V. (mL/kg) 198 (2.3)     Total Intake(mL/kg) 1398 (16)     Urine (mL/kg/hr) 2350 (1.1)     Stool 0     Total Output 2350     Net -952           Urine Unmeasured Occurrence 2 x     Stool Unmeasured Occurrence 1 x           Lines, Drains & Airways       Active LDAs       Name Placement date Placement time Site Days    Peripheral IV 06/10/25 1550 22 G Left;Posterior Forearm 06/10/25  1550  Forearm  2                  Current Facility-Administered Medications   Medication Dose Route Frequency Provider Last Rate Last Admin    sennosides-docusate (PERICOLACE) 8.6-50 MG per tablet 2 tablet  2 tablet Oral BID Ameya Carlin MD   2 tablet at 06/13/25 0913    And    polyethylene glycol (MIRALAX) packet 17 g  17 g Oral Daily PRN Ameya Carlin MD   17 g at 06/10/25 0853    And    bisacodyl (DULCOLAX) EC tablet 5 mg  5 mg Oral Daily PRN Ameya Carlin MD        And    bisacodyl (DULCOLAX) suppository 10 mg  10 mg Rectal Daily PRN Ameya Carlin MD        cefTRIAXone (ROCEPHIN) 1,000 mg in sodium chloride 0.9 % 100 mL IVPB-VTB  1,000 mg Intravenous Q24H Simone Coelho  mL/hr at 06/12/25 1513 1,000 mg at 06/12/25 1513    cholecalciferol (VITAMIN D3) tablet 2,000 Units  2,000 Units Oral Daily Ameya Carlin MD   2,000 Units at 06/13/25 0913    cyclobenzaprine (FLEXERIL) tablet 10 mg  10 mg Oral Nightly Ameya Carlin MD   10 mg at  06/12/25 2050    doxycycline (VIBRAMYCIN) 100 mg in sodium chloride 0.9 % 100 mL IVPB-VTB  100 mg Intravenous Q12H Simone Coelho MD   100 mg at 06/13/25 0422    famotidine (PEPCID) tablet 40 mg  40 mg Oral Daily Ameya Carlin MD   40 mg at 06/13/25 0913    folic acid (FOLVITE) tablet 1,000 mcg  1,000 mcg Oral Daily Ameya Carlin MD   1,000 mcg at 06/13/25 0913    gabapentin (NEURONTIN) capsule 400 mg  400 mg Oral TID Ameya Carlin MD   400 mg at 06/13/25 0913    heparin (porcine) 5000 UNIT/ML injection 5,000 Units  5,000 Units Subcutaneous Q8H Simone Coelho MD   5,000 Units at 06/13/25 0542    losartan (COZAAR) tablet 100 mg  100 mg Oral Q24H Ameya Carlin MD   100 mg at 06/13/25 0913    metoprolol succinate XL (TOPROL-XL) 24 hr tablet 25 mg  25 mg Oral Daily Ameya Carlin MD   25 mg at 06/13/25 0913    morphine injection 4 mg  4 mg Intravenous Q3H PRN Ameya Carlin MD   4 mg at 06/11/25 2052    And    naloxone (NARCAN) injection 0.4 mg  0.4 mg Intravenous Q5 Min PRN Ameya Carlin MD        nitroglycerin (NITROSTAT) SL tablet 0.4 mg  0.4 mg Sublingual Q5 Min PRN Ameya Carlin MD        ondansetron ODT (ZOFRAN-ODT) disintegrating tablet 4 mg  4 mg Oral Q6H PRN Ameya Carlin MD        Or    ondansetron (ZOFRAN) injection 4 mg  4 mg Intravenous Q6H PRN Ameya Carlin MD   4 mg at 06/09/25 0831    ondansetron ODT (ZOFRAN-ODT) disintegrating tablet 4 mg  4 mg Oral Q6H PRN Ameya Carlin MD        oxyCODONE-acetaminophen (PERCOCET) 7.5-325 MG per tablet 2 tablet  2 tablet Oral Q4H PRN Ameya Carlin MD   2 tablet at 06/13/25 0912    predniSONE (DELTASONE) tablet 4 mg  4 mg Oral Daily Ameya Carlin MD   4 mg at 06/13/25 0913    rosuvastatin (CRESTOR) tablet 20 mg  20 mg Oral Daily Ameya Carlin MD   20 mg at 06/13/25 0913    sodium chloride 0.9 % flush 10 mL  10 mL Intravenous Q12H Ameya Carlin MD   10 mL at 06/13/25 0913    sodium chloride 0.9 %  flush 10 mL  10 mL Intravenous PRN Ameya Carlin MD        sodium chloride 0.9 % infusion 40 mL  40 mL Intravenous PRN Ameya Carlin MD        valACYclovir (VALTREX) tablet 500 mg  500 mg Oral Daily Ameya Carlin MD   500 mg at 06/13/25 0913        Operative/Procedure Notes (most recent note)        Ameya Carlin MD at 06/09/25 1339          Operative report Surgeon Ameya Myles    Preoperative diagnosis right proximal humerus 4 parts fracture dislocation  Postoperative diagnosis the same  Surgical procedure right shoulder reverse arthroplasty using DePuy system    After proper patient limb identification bring to the operating room general anesthesia beachchair sitting position chlorhexidine scrubbing and sterile draping proper timeout performed.  Anterior longitudinal incision deltopectoral interval retracting the cephalic vein with the deltoid.  Arriving at the fracture site.  The greater tuberosity is still attached with the rotator cuff.  Tagging it at the tendon bone junction with FiberWire #2 with 2 different sutures.  Retracting it posteriorly.  The lesser tuberosity is present as well and tagging it with FiberWire #2 at the tendon bone junction.  Humeral head is present impacted at the tip of the shaft of the humerus.  Removing the articular surface.  Irrigation thoroughly with sterile fluid.  The having a good access to the glenoid.  Removing remnant of the biceps tendon.  Then insertion of guidepin in the central slightly inferior portion reaming with the adequate reamer and impaction of the baseplate secured with the central screw 35 mm.  Inferior screw 25 mm and superior anterior superior posterior screw were inserted.  32 mm neutral +4 mm sphere was inserted.  Irrigation with sterile fluid.  Drilling hole through the shaft of the humerus to pass in the FiberWire.  Then broaching with the adequate broach.  Size large long stem.  With 30 degree of retroversion.  Trial with a 0  show very good fit and stability.  Definitive implant inserted in that fashion.  Then reapproximation of the tuberosity after reduction of the prosthesis.  To wrap the proximal body of the prosthesis.  Tying them together in a crisscross fashion.  With the sutures.  Then passing the suture through from the shaft to the tuberosity together and tying them together.  Very good reapproximation is present.  Irrigation with sterile fluid.  Deltopectoral interval closed with Vicryl 1 mattress suture skin closed Vicryl 1 Monocryl 2 oh and mL clip.  Large dressing was applied and patient returned to recovery in stable condition  Blood loss about 50 cc    Electronically signed by Ameya Carlin MD at 25 1543          Physical Therapy Notes (most recent note)        Talat Trujillo, PT at 25 1422  Version 1 of 1         Acute Care - Physical Therapy Treatment Note  [facility]     Patient Name: Tiara Stubbs  : 1945  MRN: [id number]  Today's Date: 2025      Visit Dx:     ICD-10-CM ICD-9-CM   1. Shoulder fracture, right, closed, initial encounter  S42.91XA 812.00   2. Closed fracture of right foot, initial encounter  S92.901A 825.20   3. Acute right ankle pain  M25.571 719.47     338.19   4. Post-operative state  Z98.890 V45.89     Patient Active Problem List   Diagnosis    Overweight (BMI 25.0-29.9)    Immunization due    Chronic cough    SOB (shortness of breath)    Shoulder fracture, right, closed, initial encounter    Closed fracture of bone of right foot    Acute right ankle pain     Past Medical History:   Diagnosis Date    Acid reflux     Cancer     skin    Fibromyalgia     High cholesterol     Hx: recurrent pneumonia     Hypertension     Lupus     Osteoarthritis     Rheumatoid arthritis     Shoulder injury      Past Surgical History:   Procedure Laterality Date    CARPAL TUNNEL RELEASE Bilateral     KNEE SURGERY Right     TOTAL WRIST ARTHROPLASTY Right      PT Assessment (Last 12 Hours)        PT Evaluation and Treatment       [facility] Name 06/13/25 1413          Physical Therapy Time and Intention    Subjective Information complains of;weakness;fatigue;pain  -KM     Document Type therapy note (daily note)  -KM     Mode of Treatment physical therapy  -KM     Patient Effort good  -KM     Symptoms Noted During/After Treatment fatigue  -KM       [facility] Name 06/13/25 1413          General Information    Patient Profile Reviewed yes  -KM     Patient Observations alert;cooperative;agree to therapy  -KM     Existing Precautions/Restrictions fall;shoulder;non-weight bearing;brace on at all times  -KM       [facility] Name 06/13/25 1413          Pain    Pretreatment Pain Rating 1/10  -KM     Posttreatment Pain Rating 1/10  -KM     Pain Location shoulder  -KM     Pain Side/Orientation right  -KM       [facility] Name 06/13/25 1413          Cognition    Affect/Mental Status (Cognition) other (see comments)  pleasantly confused  -KM     Orientation Status (Cognition) oriented to;person;verbal cues/prompts needed for orientation  -KM     Follows Commands (Cognition) follows one-step commands  -KM       [facility] Name 06/13/25 1413          Bed Mobility    Comment, (Bed Mobility) sitting up in chair  -KM       Row Name 06/13/25 1413          Transfers    Transfers sit-stand transfer;stand-sit transfer  -KM       Row Name 06/13/25 1413          Sit-Stand Transfer    Sit-Stand [place] (Transfers) contact guard;minimum assist (75% patient effort)  -KM       Row Name 06/13/25 1413          Stand-Sit Transfer    Stand-Sit [place] (Transfers) contact guard  -KM       Row Name 06/13/25 1413          Gait/Stairs (Locomotion)    Comment, (Gait/Stairs) pt. recently ambulated to chair w/ nursing.  -KM       [facility] Name 06/13/25 1413          Safety Issues/Impairments Affecting Functional Mobility    Impairments Affecting Function (Mobility) balance;cognition;endurance/activity tolerance;pain;range of motion (ROM);strength  -KM     Cognitive  Impairments, Mobility Safety/Performance awareness, need for assistance;insight into deficits/self-awareness;judgment;problem-solving/reasoning  -KM       Row Name 06/13/25 1413          Motor Skills    Comments, Therapeutic Exercise seated ther-ex  -KM       Row Name             Wound 06/09/25 1511 Right shoulder Surgical Open Surgical Incision    Wound - Properties Group Placement Date: 06/09/25  -CE Placement Time: 1511  -CE Side: Right  -CE Location: shoulder  -CE Primary Wound Type: Surgical  -CE Secondary Wound Type - Surgical: Open Surg  -CE    Retired Wound - Properties Group Placement Date: 06/09/25  -CE Placement Time: 1511  -CE Side: Right  -CE Location: shoulder  -CE    Retired Wound - Properties Group Placement Date: 06/09/25  -CE Placement Time: 1511  -CE Side: Right  -CE Location: shoulder  -CE    Retired Wound - Properties Group Date first assessed: 06/09/25  -CE Time first assessed: 1511  -CE Side: Right  -CE Location: shoulder  -CE      Row Name 06/13/25 1413          Progress Summary (PT)    Daily Progress Summary (PT) Pt. was able to demonstrate improved functional mobility skills. She was able to tolerated increased activity. She tolerated session well. Pt. would continue to benefit from PT services.  -KM               User Key  (r) = Recorded By, (t) = Taken By, (c) = Cosigned By      Initials Name Provider Type    Julián Cunningham, RN Registered Nurse    Talat Vaughn, SHERRI Physical Therapist                    Physical Therapy Education       Title: PT OT SLP Therapies (Done)       Topic: Physical Therapy (Done)       Point: Mobility training (Done)       Learning Progress Summary            Patient Acceptance, E,TB, VU by ROSALBA at 6/10/2025 1455                      Point: Home exercise program (Done)       Learning Progress Summary            Patient Acceptance, E,TB, VU by ROSALBA at 6/10/2025 1455                      Point: Body mechanics (Done)       Learning Progress Summary             Patient Acceptance, E,TB, VU by  at 6/10/2025 1455                      Point: Precautions (Done)       Learning Progress Summary            Patient Acceptance, E,TB, VU by  at 6/10/2025 1455                                      User Key       Initials Effective Dates Name Provider Type Discipline     05/24/22 -  Talat Trujillo PT Physical Therapist PT                  PT Recommendation and Plan  Anticipated Discharge Disposition (PT): inpatient rehabilitation facility  Planned Therapy Interventions (PT): balance training, bed mobility training, gait training, home exercise program, patient/family education, postural re-education, ROM (range of motion), strengthening, stretching, transfer training  Therapy Frequency (PT): 5 times/wk (5x/wk)  Progress Summary (PT)  Daily Progress Summary (PT): Pt. was able to demonstrate improved functional mobility skills. She was able to tolerated increased activity. She tolerated session well. Pt. would continue to benefit from PT services.  Plan of Care Reviewed With: patient  Outcome Evaluation: Pt. evaluation completed during PT session. She was able to perform functional mobility skills w/ Lachelle-modA x2. She was able to transfer but unable to ambulate at time of evaluation. Pt. would benefit from increased PT services at this time.       Time Calculation:    PT Charges       Row Name 06/13/25 1413             Time Calculation    PT Received On 06/13/25  -KM         Time Calculation- PT    Total Timed Code Minutes- PT 23 minute(s)  -KM                User Key  (r) = Recorded By, (t) = Taken By, (c) = Cosigned By      Initials Name Provider Type    Taalt Vaughn PT Physical Therapist                  Therapy Charges for Today       Code Description Service Date Service Provider Modifiers Qty    74829478684  PT THERAPEUTIC ACT EA 15 MIN 6/12/2025 Talat Trujillo, PT GP 1    17013747087 HC GAIT TRAINING EA 15 MIN 6/12/2025 Talat Trujillo, PT GP 1    [id number]  PT  THERAPEUTIC ACT EA 15 MIN 2025 Talat Trujillo, PT GP 1    [id number]  PT THER PROC EA 15 MIN 2025 Talat Trujillo, PT GP 1            PT G-Codes  AM-PAC 6 Clicks Score (PT): 17    Talat Trujillo, PT  2025      Electronically signed by Talat Trujillo, PT at 25 1422          Occupational Therapy Notes (most recent note)        Tata Ruiz, OT at 25 1242          Acute Care - Occupational Therapy Treatment Note   [facility]     Patient Name: Tiara Stubbs  : 1945  MRN: [id number]  Today's Date: 2025             Admit Date: 2025       ICD-10-CM ICD-9-CM   1. Shoulder fracture, right, closed, initial encounter  S42.91XA 812.00   2. Closed fracture of right foot, initial encounter  S92.901A 825.20   3. Acute right ankle pain  M25.571 719.47     338.19   4. Post-operative state  Z98.890 V45.89     Patient Active Problem List   Diagnosis    Overweight (BMI 25.0-29.9)    Immunization due    Chronic cough    SOB (shortness of breath)    Shoulder fracture, right, closed, initial encounter    Closed fracture of bone of right foot    Acute right ankle pain     Past Medical History:   Diagnosis Date    Acid reflux     Cancer     skin    Fibromyalgia     High cholesterol     Hx: recurrent pneumonia     Hypertension     Lupus     Osteoarthritis     Rheumatoid arthritis     Shoulder injury      Past Surgical History:   Procedure Laterality Date    CARPAL TUNNEL RELEASE Bilateral     KNEE SURGERY Right     TOTAL WRIST ARTHROPLASTY Right          OT ASSESSMENT FLOWSHEET (Last 12 Hours)       OT Evaluation and Treatment       Row Name 25 1239                   OT Time and Intention    Subjective Information complains of;weakness;fatigue;pain  -LM        Document Type therapy note (daily note)  -LM        Mode of Treatment occupational therapy  -LM        Patient Effort good  -LM        Comment Patient seen this date for adl retraining, fxl mobility, sling donning/doffing, staff ed.   patient required min assist with standing balance.  Max assist for sling adjustment,  RN present for sling education.  -LM           General Information    Existing Precautions/Restrictions fall;shoulder;non-weight bearing;brace on at all times  -LM        Limitations/Impairments safety/cognitive  -LM           Pain Assessment    Pretreatment Pain Rating 1/10  -LM        Posttreatment Pain Rating 1/10  -LM           Cognition    Affect/Mental Status (Cognition) confused  pleasantly  -LM           Upper Body Dressing Assessment/Training    [place] Level (Upper Body Dressing) dependent (less than 25% patient effort);maximum assist (25% patient effort)  -LM           Lower Body Dressing Assessment/Training    [place] Level (Lower Body Dressing) dependent (less than 25% patient effort);maximum assist (25% patient effort)  -LM           Grooming Assessment/Training    [place] Level (Grooming) maximum assist (25% patient effort)  -LM           Self-Feeding Assessment/Training    [place] Level (Feeding) set up  -LM           Toileting Assessment/Training    [place] Level (Toileting) dependent (less than 25% patient effort)  -LM           Wound 06/09/25 1511 Right shoulder Surgical Open Surgical Incision    Wound - Properties Group Placement Date: 06/09/25  -CE Placement Time: 1511  -CE Side: Right  -CE Location: shoulder  -CE Primary Wound Type: Surgical  -CE Secondary Wound Type - Surgical: Open Surg  -CE    Retired Wound - Properties Group Placement Date: 06/09/25  -CE Placement Time: 1511  -CE Side: Right  -CE Location: shoulder  -CE    Retired Wound - Properties Group Placement Date: 06/09/25  -CE Placement Time: 1511  -CE Side: Right  -CE Location: shoulder  -CE    Retired Wound - Properties Group Date first assessed: 06/09/25  -CE Time first assessed: 1511  -CE Side: Right  -CE Location: shoulder  -CE       Positioning and Restraints    Post Treatment Position chair  -LM        In Chair call  light within reach;encouraged to call for assist;exit alarm on;notified [facility]  -                  User Key  (r) = Recorded By, (t) = Taken By, (c) = Cosigned By      Initials Name Effective Dates    CE Julián Seaman, SAIMA 06/16/21 -     Tata Mcmillan OT 06/16/21 -                            OT Recommendation and Plan              Time Calculation:     Therapy Charges for Today       Code Description Service Date Service Provider Modifiers Qty    95183468892  OT SELF CARE/MGMT/TRAIN EA 15 MIN 6/12/2025 Tata Ruiz OT GO 2    [id number]  OT SELF CARE/MGMT/TRAIN EA 15 MIN 6/13/2025 Tata Ruiz OT GO 2                 Tata Ruiz OT  6/13/2025    Electronically signed by Tata Ruiz OT at 06/13/25 1242

## 2025-06-13 NOTE — PLAN OF CARE
Goal Outcome Evaluation:  Plan of Care Reviewed With: patient        Progress: no change  Outcome Evaluation: Patient resting in bed. VSS on room air. Patient ambulated to BSC multiple times throughout this shift. Patient voices no concerns at this time, will continue with POC.

## 2025-06-13 NOTE — THERAPY TREATMENT NOTE
Acute Care - Occupational Therapy Treatment Note   [facility]     Patient Name: Tiara Stubbs  : 1945  MRN: [id number]  Today's Date: 2025             Admit Date: 2025       ICD-10-CM ICD-9-CM   1. Shoulder fracture, right, closed, initial encounter  S42.91XA 812.00   2. Closed fracture of right foot, initial encounter  S92.901A 825.20   3. Acute right ankle pain  M25.571 719.47     338.19   4. Post-operative state  Z98.890 V45.89     Patient Active Problem List   Diagnosis    Overweight (BMI 25.0-29.9)    Immunization due    Chronic cough    SOB (shortness of breath)    Shoulder fracture, right, closed, initial encounter    Closed fracture of bone of right foot    Acute right ankle pain     Past Medical History:   Diagnosis Date    Acid reflux     Cancer     skin    Fibromyalgia     High cholesterol     Hx: recurrent pneumonia     Hypertension     Lupus     Osteoarthritis     Rheumatoid arthritis     Shoulder injury      Past Surgical History:   Procedure Laterality Date    CARPAL TUNNEL RELEASE Bilateral     KNEE SURGERY Right     TOTAL WRIST ARTHROPLASTY Right          OT ASSESSMENT FLOWSHEET (Last 12 Hours)       OT Evaluation and Treatment       Row Name 25 1239                   OT Time and Intention    Subjective Information complains of;weakness;fatigue;pain  -LM        Document Type therapy note (daily note)  -LM        Mode of Treatment occupational therapy  -LM        Patient Effort good  -LM        Comment Patient seen this date for adl retraining, fxl mobility, sling donning/doffing, staff ed.  patient required min assist with standing balance.  Max assist for sling adjustment,  RN present for sling education.  -LM           General Information    Existing Precautions/Restrictions fall;shoulder;non-weight bearing;brace on at all times  -LM        Limitations/Impairments safety/cognitive  -LM           Pain Assessment    Pretreatment Pain Rating 10  -LM        Posttreatment Pain  Rating 1/10  -LM           Cognition    Affect/Mental Status (Cognition) confused  pleasantly  -LM           Upper Body Dressing Assessment/Training    [place] Level (Upper Body Dressing) dependent (less than 25% patient effort);maximum assist (25% patient effort)  -LM           Lower Body Dressing Assessment/Training    [place] Level (Lower Body Dressing) dependent (less than 25% patient effort);maximum assist (25% patient effort)  -LM           Grooming Assessment/Training    [place] Level (Grooming) maximum assist (25% patient effort)  -LM           Self-Feeding Assessment/Training    [place] Level (Feeding) set up  -LM           Toileting Assessment/Training    [place] Level (Toileting) dependent (less than 25% patient effort)  -LM           Wound 06/09/25 1511 Right shoulder Surgical Open Surgical Incision    Wound - Properties Group Placement Date: 06/09/25  -CE Placement Time: 1511 -CE Side: Right  -CE Location: shoulder  -CE Primary Wound Type: Surgical  -CE Secondary Wound Type - Surgical: Open Surg  -CE    Retired Wound - Properties Group Placement Date: 06/09/25  -CE Placement Time: 1511  -CE Side: Right  -CE Location: shoulder  -CE    Retired Wound - Properties Group Placement Date: 06/09/25  -CE Placement Time: 1511  -CE Side: Right  -CE Location: shoulder  -CE    Retired Wound - Properties Group Date first assessed: 06/09/25  -CE Time first assessed: 1511 -CE Side: Right  -CE Location: shoulder  -CE       Positioning and Restraints    Post Treatment Position chair  -LM        In Chair call light within reach;encouraged to call for assist;exit alarm on;notified nsg  -LM                  User Key  (r) = Recorded By, (t) = Taken By, (c) = Cosigned By      Initials Name Effective Dates    CE Julián Seaman RN 06/16/21 -     Tata Mcmillan OT 06/16/21 -                            OT Recommendation and Plan              Time Calculation:     Therapy Charges for Today        Code Description Service Date Service Provider Modifiers Qty    81236766539 HC OT SELF CARE/MGMT/TRAIN EA 15 MIN 6/12/2025 Tata Ruiz, OT GO 2    20768947200 HC OT SELF CARE/MGMT/TRAIN EA 15 MIN 6/13/2025 Tata Ruiz, OT GO 2                 Tata Ruiz OT  6/13/2025

## 2025-06-13 NOTE — THERAPY TREATMENT NOTE
Acute Care - Physical Therapy Treatment Note   [facility]     Patient Name: Tiara Stubbs  : 1945  MRN: [id number]  Today's Date: 2025      Visit Dx:     ICD-10-CM ICD-9-CM   1. Shoulder fracture, right, closed, initial encounter  S42.91XA 812.00   2. Closed fracture of right foot, initial encounter  S92.901A 825.20   3. Acute right ankle pain  M25.571 719.47     338.19   4. Post-operative state  Z98.890 V45.89     Patient Active Problem List   Diagnosis    Overweight (BMI 25.0-29.9)    Immunization due    Chronic cough    SOB (shortness of breath)    Shoulder fracture, right, closed, initial encounter    Closed fracture of bone of right foot    Acute right ankle pain     Past Medical History:   Diagnosis Date    Acid reflux     Cancer     skin    Fibromyalgia     High cholesterol     Hx: recurrent pneumonia     Hypertension     Lupus     Osteoarthritis     Rheumatoid arthritis     Shoulder injury      Past Surgical History:   Procedure Laterality Date    CARPAL TUNNEL RELEASE Bilateral     KNEE SURGERY Right     TOTAL WRIST ARTHROPLASTY Right      PT Assessment (Last 12 Hours)       PT Evaluation and Treatment       Row Name 25 1413          Physical Therapy Time and Intention    Subjective Information complains of;weakness;fatigue;pain  -KM     Document Type therapy note (daily note)  -KM     Mode of Treatment physical therapy  -KM     Patient Effort good  -KM     Symptoms Noted During/After Treatment fatigue  -KM       Row Name 25 1413          General Information    Patient Profile Reviewed yes  -KM     Patient Observations alert;cooperative;agree to therapy  -KM     Existing Precautions/Restrictions fall;shoulder;non-weight bearing;brace on at all times  -KM       Row Name 25 1413          Pain    Pretreatment Pain Rating /10  -KM     Posttreatment Pain Rating /10  -KM     Pain Location shoulder  -KM     Pain Side/Orientation right  -KM       Row Name 25 1413           Cognition    Affect/Mental Status (Cognition) other (see comments)  pleasantly confused  -     Orientation Status (Cognition) oriented to;person;verbal cues/prompts needed for orientation  -     Follows Commands (Cognition) follows one-step commands  -       Row Name 06/13/25 1413          Bed Mobility    Comment, (Bed Mobility) sitting up in chair  -       Row Name 06/13/25 1413          Transfers    Transfers sit-stand transfer;stand-sit transfer  -       Row Name 06/13/25 1413          Sit-Stand Transfer    Sit-Stand [place] (Transfers) contact guard;minimum assist (75% patient effort)  -       Row Name 06/13/25 1413          Stand-Sit Transfer    Stand-Sit [place] (Transfers) contact guard  -       Row Name 06/13/25 1413          Gait/Stairs (Locomotion)    Comment, (Gait/Stairs) pt. recently ambulated to chair w/ nursing.  -       Row Name 06/13/25 1413          Safety Issues/Impairments Affecting Functional Mobility    Impairments Affecting Function (Mobility) balance;cognition;endurance/activity tolerance;pain;range of motion (ROM);strength  -KM     Cognitive Impairments, Mobility Safety/Performance awareness, need for assistance;insight into deficits/self-awareness;judgment;problem-solving/reasoning  -       Row Name 06/13/25 1413          Motor Skills    Comments, Therapeutic Exercise seated ther-ex  -       Row Name             Wound 06/09/25 1511 Right shoulder Surgical Open Surgical Incision    Wound - Properties Group Placement Date: 06/09/25  -CE Placement Time: 1511 -CE Side: Right  -CE Location: shoulder  -CE Primary Wound Type: Surgical  -CE Secondary Wound Type - Surgical: Open Surg  -CE    Retired Wound - Properties Group Placement Date: 06/09/25  -CE Placement Time: 1511  -CE Side: Right  -CE Location: shoulder  -CE    Retired Wound - Properties Group Placement Date: 06/09/25  -CE Placement Time: 1511 -CE Side: Right  -CE Location: shoulder  -CE    Retired Wound -  Properties Group Date first assessed: 06/09/25  -CE Time first assessed: 1511  -CE Side: Right  -CE Location: shoulder  -CE      Row Name 06/13/25 1413          Progress Summary (PT)    Daily Progress Summary (PT) Pt. was able to demonstrate improved functional mobility skills. She was able to tolerated increased activity. She tolerated session well. Pt. would continue to benefit from PT services.  -               User Key  (r) = Recorded By, (t) = Taken By, (c) = Cosigned By      Initials Name Provider Type    CE Julián Seaman, RN Registered Nurse    Talat Vaughn, SHERRI Physical Therapist                    Physical Therapy Education       Title: PT OT SLP Therapies (Done)       Topic: Physical Therapy (Done)       Point: Mobility training (Done)       Learning Progress Summary            Patient Acceptance, E,TB, VU by  at 6/10/2025 1455                      Point: Home exercise program (Done)       Learning Progress Summary            Patient Acceptance, E,TB, VU by  at 6/10/2025 1455                      Point: Body mechanics (Done)       Learning Progress Summary            Patient Acceptance, E,TB, VU by  at 6/10/2025 1455                      Point: Precautions (Done)       Learning Progress Summary            Patient Acceptance, E,TB, VU by  at 6/10/2025 1455                                      User Key       Initials Effective Dates Name Provider Type Discipline    ROSALBA 05/24/22 -  Talat Trujillo, SHERRI Physical Therapist PT                  PT Recommendation and Plan  Anticipated Discharge Disposition (PT): inpatient rehabilitation facility  Planned Therapy Interventions (PT): balance training, bed mobility training, gait training, home exercise program, patient/family education, postural re-education, ROM (range of motion), strengthening, stretching, transfer training  Therapy Frequency (PT): 5 times/wk (5x/wk)  Progress Summary (PT)  Daily Progress Summary (PT): Pt. was able to demonstrate  improved functional mobility skills. She was able to tolerated increased activity. She tolerated session well. Pt. would continue to benefit from PT services.  Plan of Care Reviewed With: patient  Outcome Evaluation: Pt. evaluation completed during PT session. She was able to perform functional mobility skills w/ Lachelle-modA x2. She was able to transfer but unable to ambulate at time of evaluation. Pt. would benefit from increased PT services at this time.       Time Calculation:    PT Charges       Row Name 06/13/25 1413             Time Calculation    PT Received On 06/13/25  -KM         Time Calculation- PT    Total Timed Code Minutes- PT 23 minute(s)  -KM                User Key  (r) = Recorded By, (t) = Taken By, (c) = Cosigned By      Initials Name Provider Type    Talat Vaughn, PT Physical Therapist                  Therapy Charges for Today       Code Description Service Date Service Provider Modifiers Qty    54674399837 HC PT THERAPEUTIC ACT EA 15 MIN 6/12/2025 Talat Turjillo, PT GP 1    36907589794 HC GAIT TRAINING EA 15 MIN 6/12/2025 Talat Trujillo, PT GP 1    70776824531 HC PT THERAPEUTIC ACT EA 15 MIN 6/13/2025 Talat Trujillo, PT GP 1    47034926319 HC PT THER PROC EA 15 MIN 6/13/2025 Talat Trujillo, PT GP 1            PT G-Codes  AM-PAC 6 Clicks Score (PT): 17    Talat Trujillo PT  6/13/2025

## 2025-06-13 NOTE — PLAN OF CARE
Goal Outcome Evaluation:           Progress: improving  Outcome Evaluation: Pts VSS on RA. Pt ambulated with standby assist to bathroom. Cont IV ABX. Pt voices no complaints. Will cont with POC.

## 2025-06-13 NOTE — CASE MANAGEMENT/SOCIAL WORK
Discharge Planning Assessment   [place]     Patient Name: Tiara Stubbs  MRN: [id number]  Today's Date: 6/13/2025    Admit Date: 6/7/2025          Discharge Plan       Row Name 06/13/25 0930       Plan    Plan SS attempted to visit daughter at bedside, but per nurse daughter plans on returning soon. SS to follow up to help assit Pt's daughter with the appeal for re-consideration for  inpatient acute rehab. SS to follow.    11:25am: SS phoned Pt's dtr. Pts dtr to complete appeal [phone]. SS to follow.     14:55pm: SS faxed appointment of representative form, updated clinicals to expedited appeal fax [phone]. SS to follow.                   Continued Care and Services - Admitted Since 6/7/2025       Destination       Service Provider Request Status Services Address Phone Fax Patient Preferred    [facility] Considering -- [address] [place] [id number] [phone] --       Internal Comment last updated by Malina Starks BSW 6/12/2025 1532    Peer to peer denied. Appeal to be done by dtr on 06/13/25                               KARLY Robertson

## 2025-06-14 LAB
ANION GAP SERPL CALCULATED.3IONS-SCNC: 10.6 MMOL/L (ref 5–15)
BUN SERPL-MCNC: 20 MG/DL (ref 8–23)
BUN/CREAT SERPL: 23.3 (ref 7–25)
CALCIUM SPEC-SCNC: 8.6 MG/DL (ref 8.6–10.5)
CHLORIDE SERPL-SCNC: 109 MMOL/L (ref 98–107)
CO2 SERPL-SCNC: 20.4 MMOL/L (ref 22–29)
CREAT SERPL-MCNC: 0.86 MG/DL (ref 0.57–1)
DEPRECATED RDW RBC AUTO: 52.4 FL (ref 37–54)
EGFRCR SERPLBLD CKD-EPI 2021: 68.4 ML/MIN/1.73
EOSINOPHIL # BLD MANUAL: 0.29 10*3/MM3 (ref 0–0.4)
EOSINOPHIL NFR BLD MANUAL: 2 % (ref 0.3–6.2)
ERYTHROCYTE [DISTWIDTH] IN BLOOD BY AUTOMATED COUNT: 14.1 % (ref 12.3–15.4)
GLUCOSE SERPL-MCNC: 82 MG/DL (ref 65–99)
HCT VFR BLD AUTO: 30.5 % (ref 34–46.6)
HGB BLD-MCNC: 9.7 G/DL (ref 12–15.9)
HYPOCHROMIA BLD QL: ABNORMAL
LYMPHOCYTES # BLD MANUAL: 5.71 10*3/MM3 (ref 0.7–3.1)
LYMPHOCYTES NFR BLD MANUAL: 4 % (ref 5–12)
MCH RBC QN AUTO: 32.8 PG (ref 26.6–33)
MCHC RBC AUTO-ENTMCNC: 31.8 G/DL (ref 31.5–35.7)
MCV RBC AUTO: 103 FL (ref 79–97)
METAMYELOCYTES NFR BLD MANUAL: 1 % (ref 0–0)
MONOCYTES # BLD: 0.57 10*3/MM3 (ref 0.1–0.9)
NEUTROPHILS # BLD AUTO: 7.57 10*3/MM3 (ref 1.7–7)
NEUTROPHILS NFR BLD MANUAL: 53 % (ref 42.7–76)
NRBC SPEC MANUAL: 1 /100 WBC (ref 0–0.2)
PLAT MORPH BLD: NORMAL
PLATELET # BLD AUTO: 207 10*3/MM3 (ref 140–450)
PMV BLD AUTO: 10.8 FL (ref 6–12)
POTASSIUM SERPL-SCNC: 4.7 MMOL/L (ref 3.5–5.2)
RBC # BLD AUTO: 2.96 10*6/MM3 (ref 3.77–5.28)
SCAN SLIDE: NORMAL
SODIUM SERPL-SCNC: 140 MMOL/L (ref 136–145)
VARIANT LYMPHS NFR BLD MANUAL: 40 % (ref 19.6–45.3)
WBC NRBC COR # BLD AUTO: 14.28 10*3/MM3 (ref 3.4–10.8)

## 2025-06-14 PROCEDURE — 99231 SBSQ HOSP IP/OBS SF/LOW 25: CPT | Performed by: INTERNAL MEDICINE

## 2025-06-14 PROCEDURE — 25010000002 CEFTRIAXONE PER 250 MG: Performed by: INTERNAL MEDICINE

## 2025-06-14 PROCEDURE — 25010000002 HEPARIN (PORCINE) PER 1000 UNITS: Performed by: INTERNAL MEDICINE

## 2025-06-14 PROCEDURE — 97116 GAIT TRAINING THERAPY: CPT

## 2025-06-14 PROCEDURE — 85025 COMPLETE CBC W/AUTO DIFF WBC: CPT | Performed by: INTERNAL MEDICINE

## 2025-06-14 PROCEDURE — 80048 BASIC METABOLIC PNL TOTAL CA: CPT | Performed by: INTERNAL MEDICINE

## 2025-06-14 PROCEDURE — 63710000001 PREDNISONE PER 5 MG: Performed by: ORTHOPAEDIC SURGERY

## 2025-06-14 PROCEDURE — 25010000002 DOXYCYCLINE 100 MG RECONSTITUTED SOLUTION 1 EACH VIAL: Performed by: INTERNAL MEDICINE

## 2025-06-14 PROCEDURE — 85007 BL SMEAR W/DIFF WBC COUNT: CPT | Performed by: INTERNAL MEDICINE

## 2025-06-14 PROCEDURE — 97530 THERAPEUTIC ACTIVITIES: CPT

## 2025-06-14 RX ORDER — OXYCODONE AND ACETAMINOPHEN 7.5; 325 MG/1; MG/1
2 TABLET ORAL EVERY 4 HOURS PRN
Refills: 0 | Status: DISPENSED | OUTPATIENT
Start: 2025-06-14 | End: 2025-06-15

## 2025-06-14 RX ADMIN — CYCLOBENZAPRINE 10 MG: 10 TABLET, FILM COATED ORAL at 20:14

## 2025-06-14 RX ADMIN — HEPARIN SODIUM 5000 UNITS: 5000 INJECTION INTRAVENOUS; SUBCUTANEOUS at 20:14

## 2025-06-14 RX ADMIN — HEPARIN SODIUM 5000 UNITS: 5000 INJECTION INTRAVENOUS; SUBCUTANEOUS at 13:39

## 2025-06-14 RX ADMIN — VALACYCLOVIR HYDROCHLORIDE 500 MG: 500 TABLET, FILM COATED ORAL at 08:34

## 2025-06-14 RX ADMIN — Medication 10 ML: at 08:35

## 2025-06-14 RX ADMIN — OXYCODONE AND ACETAMINOPHEN 2 TABLET: 7.5; 325 TABLET ORAL at 21:05

## 2025-06-14 RX ADMIN — GABAPENTIN 400 MG: 400 CAPSULE ORAL at 08:34

## 2025-06-14 RX ADMIN — FOLIC ACID 1000 MCG: 1 TABLET ORAL at 08:34

## 2025-06-14 RX ADMIN — CEFTRIAXONE 1000 MG: 1 INJECTION, POWDER, FOR SOLUTION INTRAMUSCULAR; INTRAVENOUS at 17:03

## 2025-06-14 RX ADMIN — GABAPENTIN 400 MG: 400 CAPSULE ORAL at 20:14

## 2025-06-14 RX ADMIN — Medication 2000 UNITS: at 08:34

## 2025-06-14 RX ADMIN — GABAPENTIN 400 MG: 400 CAPSULE ORAL at 17:04

## 2025-06-14 RX ADMIN — OXYCODONE AND ACETAMINOPHEN 2 TABLET: 7.5; 325 TABLET ORAL at 08:41

## 2025-06-14 RX ADMIN — OXYCODONE AND ACETAMINOPHEN 2 TABLET: 7.5; 325 TABLET ORAL at 13:39

## 2025-06-14 RX ADMIN — HEPARIN SODIUM 5000 UNITS: 5000 INJECTION INTRAVENOUS; SUBCUTANEOUS at 05:11

## 2025-06-14 RX ADMIN — DOXYCYCLINE 100 MG: 100 INJECTION, POWDER, LYOPHILIZED, FOR SOLUTION INTRAVENOUS at 17:04

## 2025-06-14 RX ADMIN — ROSUVASTATIN 20 MG: 20 TABLET, FILM COATED ORAL at 08:34

## 2025-06-14 RX ADMIN — LOSARTAN POTASSIUM 100 MG: 50 TABLET, FILM COATED ORAL at 08:34

## 2025-06-14 RX ADMIN — DOXYCYCLINE 100 MG: 100 INJECTION, POWDER, LYOPHILIZED, FOR SOLUTION INTRAVENOUS at 05:11

## 2025-06-14 RX ADMIN — PREDNISONE 4 MG: 1 TABLET ORAL at 08:34

## 2025-06-14 RX ADMIN — METOPROLOL SUCCINATE 25 MG: 25 TABLET, EXTENDED RELEASE ORAL at 08:34

## 2025-06-14 RX ADMIN — FAMOTIDINE 40 MG: 20 TABLET, FILM COATED ORAL at 08:34

## 2025-06-14 RX ADMIN — SENNOSIDES, DOCUSATE SODIUM 2 TABLET: 50; 8.6 TABLET, FILM COATED ORAL at 08:34

## 2025-06-14 RX ADMIN — Medication 10 ML: at 20:14

## 2025-06-14 NOTE — PROGRESS NOTES
[facility] HOSPITALIST PROGRESS NOTE     Patient Identification:  Name:  Tiara Stubbs  Age:  80 y.o.  Sex:  female  :  1945  MRN:  [id number]  Visit Number:  [id number]  ROOM: [address] [place] [address]     Primary Care Provider:  Paula Downey APRN    Length of stay in inpatient status:  7    Subjective     Chief Compliant:  No chief complaint on file.      History of Presenting Illness:    Patient sitting in bedside chair. Denies any new complaints. Hopeful to get stronger and get home soon. Working with PT/OT.     ROS:  Otherwise 10 point ROS negative other than documented above in HPI.     Objective     Current Hospital Meds:cholecalciferol, 2,000 Units, Oral, Daily  cyclobenzaprine, 10 mg, Oral, Nightly  doxycycline, 100 mg, Intravenous, Q12H  famotidine, 40 mg, Oral, Daily  folic acid, 1,000 mcg, Oral, Daily  gabapentin, 400 mg, Oral, TID  heparin (porcine), 5,000 Units, Subcutaneous, Q8H  losartan, 100 mg, Oral, Q24H  metoprolol succinate XL, 25 mg, Oral, Daily  predniSONE, 4 mg, Oral, Daily  rosuvastatin, 20 mg, Oral, Daily  senna-docusate sodium, 2 tablet, Oral, BID  sodium chloride, 10 mL, Intravenous, Q12H  valACYclovir, 500 mg, Oral, Daily         Current Antimicrobial Therapy:  Anti-Infectives (From admission, onward)      Ordered     Dose/Rate Route Frequency Start Stop    06/10/25 1527  cefTRIAXone (ROCEPHIN) 1,000 mg in sodium chloride 0.9 % 100 mL IVPB-VTB        Ordering Provider: Simone Coelho MD    1,000 mg  200 mL/hr over 30 Minutes Intravenous Every 24 Hours 06/10/25 1600 25 1733    06/10/25 1527  doxycycline (VIBRAMYCIN) 100 mg in sodium chloride 0.9 % 100 mL IVPB-VTB        Ordering Provider: Simone Coelho MD    100 mg Intravenous Every 12 Hours 06/10/25 1600 06/15/25 1559    25 1709  ceFAZolin 2000 mg IVPB in 100 mL NS (VTB)        Ordering Provider: Ameya Carlin MD    2,000 mg  over 30 Minutes Intravenous Every 8 Hours 25 2300 06/10/25 0646     06/09/25 1219  sodium chloride 3,000 mL with polymyxin B 1,500,000 Units, vancomycin 3,000 mg irrigation        Ordering Provider: Ameya Carlin MD     Irrigation Once 06/09/25 1230 06/09/25 1332    06/09/25 1050  ceFAZolin 2000 mg IVPB in 100 mL NS (VTB)        Ordering Provider: Ameya Carlin MD    2,000 mg  over 30 Minutes Intravenous Once 06/09/25 1052 06/09/25 1524    06/07/25 2021  valACYclovir (VALTREX) tablet 500 mg        Ordering Provider: Ameya Carlin MD    500 mg Oral Daily 06/08/25 0900 06/08/26 0859          Current Diuretic Therapy:  Diuretics (From admission, onward)      None          ----------------------------------------------------------------------------------------------------------------------  Vital Signs:  Temp:  [97.5 °F (36.4 °C)-97.8 °F (36.6 °C)] 97.7 °F (36.5 °C)  Heart Rate:  [] 107  Resp:  [16-18] 16  BP: (118-153)/(63-73) 118/65  SpO2:  [95 %] 95 %  on   ;   Device (Oxygen Therapy): room air  Body mass index is 30.16 kg/m².    Wt Readings from Last 3 Encounters:   06/10/25 87.4 kg (192 lb 9.6 oz)   12/05/24 89 kg (196 lb 3.2 oz)   05/23/24 88.8 kg (195 lb 12.8 oz)     Intake & Output (last 3 days)         06/11 0701  06/12 0700 06/12 0701  06/13 0700 06/13 0701  06/14 0700 06/14 0701  06/15 0700    P.O. 1140 1200 1800 240    I.V. (mL/kg) 701.4 (8) 198 (2.3) 0 (0)     Total Intake(mL/kg) 1841.4 (21.1) 1398 (16) 1800 (20.6) 240 (2.7)    Urine (mL/kg/hr) 2800 (1.3) 2750 (1.3) 950 (0.5) 300 (0.3)    Stool  0 0 0    Total Output 2800 2750 950 300    Net -958.6 -1352 +850 -60            Urine Unmeasured Occurrence  2 x 2 x 2 x    Stool Unmeasured Occurrence  1 x 1 x 1 x          Diet: Regular/House; Fluid Consistency: Thin (IDDSI 0)  ----------------------------------------------------------------------------------------------------------------------  Physical exam:  Constitutional:  Elderly appearing male.   HENT:  Head:  Normocephalic and atraumatic.  Mouth:   Moist mucous membranes.    Eyes:  Conjunctivae and EOM are normal. No scleral icterus.    Neck:  Neck supple.  No JVD present.    Cardiovascular:  Normal rate, regular rhythm and normal heart sounds with no murmur.  Pulmonary/Chest:  No respiratory distress, no wheezes, no crackles, with normal breath sounds and good air movement.  Abdominal:  Soft.  Bowel sounds are normal.  No distension and no tenderness.   Musculoskeletal:  R shoulder bandage in place.   Neurological:  Alert and oriented to person, place, but not time.  No cranial nerve deficit.  No tongue deviation.  No facial droop.  No slurred speech.   Skin:  Skin is warm and dry. No rash noted. No pallor.   Peripheral vascular:  Pulses in all 4 extremities with no clubbing, no cyanosis, no edema.  ----------------------------------------------------------------------------------------------------------------------  Tele:    ----------------------------------------------------------------------------------------------------------------------  Results from last 7 days   Lab Units 06/14/25  0142 06/12/25  0201 06/11/25  0833 06/09/25  0722 06/09/25  0539   WBC 10*3/mm3 14.28* 11.22* 14.23*   < >  --    HEMOGLOBIN g/dL 9.7* 8.2* 9.3*   < >  --    HEMATOCRIT % 30.5* 27.1* 31.1*   < >  --    MCV fL 103.0* 106.7* 108.4*   < >  --    MCHC g/dL 31.8 30.3* 29.9*   < >  --    PLATELETS 10*3/mm3 207 193 194   < >  --    INR   --   --   --   --  1.04    < > = values in this interval not displayed.         Results from last 7 days   Lab Units 06/14/25  0142 06/12/25  0201 06/11/25  0833 06/10/25  0259 06/09/25  0722   SODIUM mmol/L 140 143 142 137 137   POTASSIUM mmol/L 4.7 4.4 3.9 4.0 4.1   MAGNESIUM mg/dL  --   --   --   --  2.4   CHLORIDE mmol/L 109* 115* 112* 107 105   CO2 mmol/L 20.4* 19.6* 20.5* 18.3* 18.9*   BUN mg/dL 20.0 21.1 28.9* 24.9* 16.7   CREATININE mg/dL 0.86 0.97 1.17* 1.27* 0.78   CALCIUM mg/dL 8.6 7.8* 8.1* 8.0* 8.8   GLUCOSE mg/dL 82 84 99 121* 100*  "  ALBUMIN g/dL  --   --  2.9* 3.1*  --    BILIRUBIN mg/dL  --   --  0.4 0.4  --    ALK PHOS U/L  --   --  54 46  --    AST (SGOT) U/L  --   --  55* 42*  --    ALT (SGPT) U/L  --   --  14 18  --    Estimated Creatinine Clearance: 59.2 mL/min (by C-G formula based on SCr of 0.86 mg/dL).  No results found for: \"AMMONIA\"              No results found for: \"HGBA1C\", \"POCGLU\"  No results found for: \"TSH\", \"FREET4\"  No results found for: \"PREGTESTUR\", \"PREGSERUM\", \"HCG\", \"HCGQUANT\"  Pain Management Panel           No data to display              Brief Urine Lab Results  (Last result in the past 365 days)        Color   Clarity   Blood   Leuk Est   Nitrite   Protein   CREAT   Urine HCG        06/10/25 1448 Yellow   Clear   Moderate (2+)   Moderate (2+)   Negative   Trace                 No results found for: \"BLOODCX\"  Urine Culture   Date Value Ref Range Status   06/10/2025 No growth  Final     No results found for: \"WOUNDCX\"  No results found for: \"STOOLCX\"  No results found for: \"RESPCX\"  No results found for: \"AFBCX\"        I have personally looked at the labs and they are summarized above.  ----------------------------------------------------------------------------------------------------------------------  Detailed radiology reports for the last 24 hours:    Imaging Results (Last 24 Hours)       ** No results found for the last 24 hours. **          Assessment & Plan    #Right humerus fracture and right shoulder dislocation secondary to mechanical ground level fall  #Right fifth metatarsal fracture  #Hyperlipidemia - continue rosuvastatin  #RA  #Osteoarthritis  #Lupus  #Hypertension - BP well controlled. Continue home metoprolol and losartan.  #GERD - continue famotidine  #VERONIQUE  #Leukocytosis   #Human rhinovirus   #Mild hepatocellular transaminitis - Normalized  #MAURO - Suspect prerenal in post operative period, improved with IVF and improved intake.   #Hospital delirium  #RLL pneumonia    #Mild postoperative anemia. "      POD#5  total reverse shoulder arthroplasty. Surgery plans nonoperatively management of metatarsal fracture with boot and WBAT. Repeat labs in AM. PRN zofran. PRN pain control. PT/OT to evaluate today. Patient with RLL infiltrate on chest x-ray, possibly aspiration in perioperative period? Will treat as aspiration pneumonia, ceftriaxone/doxy. Day 5/5. WBC count improving. Mentation overall improving.      Code status: Full      Pending Inpatient rehab consulted. Short term SNF if declined vs. Home.     Simone Coelho MD  [facility] Hospitalist  06/14/25  18:03 EDT

## 2025-06-14 NOTE — PLAN OF CARE
Goal Outcome Evaluation:  Plan of Care Reviewed With: patient        Progress: no change  Outcome Evaluation: pt has been resting in bed. pt ambulated in rm with assist and sat in chair. no other changes to note at this time; poc ongoing

## 2025-06-14 NOTE — THERAPY TREATMENT NOTE
Acute Care - Physical Therapy Treatment Note   [facility]     Patient Name: Tiara Stubbs  : 1945  MRN: [id number]  Today's Date: 2025      Visit Dx:     ICD-10-CM ICD-9-CM   1. Shoulder fracture, right, closed, initial encounter  S42.91XA 812.00   2. Closed fracture of right foot, initial encounter  S92.901A 825.20   3. Acute right ankle pain  M25.571 719.47     338.19   4. Post-operative state  Z98.890 V45.89     Patient Active Problem List   Diagnosis    Overweight (BMI 25.0-29.9)    Immunization due    Chronic cough    SOB (shortness of breath)    Shoulder fracture, right, closed, initial encounter    Closed fracture of bone of right foot    Acute right ankle pain     Past Medical History:   Diagnosis Date    Acid reflux     Cancer     skin    Fibromyalgia     High cholesterol     Hx: recurrent pneumonia     Hypertension     Lupus     Osteoarthritis     Rheumatoid arthritis     Shoulder injury      Past Surgical History:   Procedure Laterality Date    CARPAL TUNNEL RELEASE Bilateral     KNEE SURGERY Right     TOTAL WRIST ARTHROPLASTY Right      PT Assessment (Last 12 Hours)       PT Evaluation and Treatment       Row Name 25 1219          Physical Therapy Time and Intention    Subjective Information no complaints  -KM     Document Type therapy note (daily note)  -KM     Mode of Treatment individual therapy;physical therapy  -KM     Patient Effort good  -KM     Symptoms Noted During/After Treatment fatigue  -KM       Row Name 25 1219          General Information    Patient Profile Reviewed yes  -KM     Patient Observations alert;cooperative;agree to therapy  -KM     Existing Precautions/Restrictions fall;shoulder;non-weight bearing;brace on at all times  -KM     Limitations/Impairments safety/cognitive  -KM       Row Name 25 1219          Pain    Pretreatment Pain Rating 10  -KM     Posttreatment Pain Rating 1/10  -KM     Pain Location shoulder  -KM     Pain Side/Orientation right   -KM       Row Name 06/14/25 1219          Cognition    Affect/Mental Status (Cognition) other (see comments)  pleasantly confused  -KM     Orientation Status (Cognition) oriented to;person;verbal cues/prompts needed for orientation  -KM     Follows Commands (Cognition) follows one-step commands  -KM       Row Name 06/14/25 1219          Bed Mobility    Bed Mobility supine-sit  -KM     Supine-Sit [place] (Bed Mobility) moderate assist (50% patient effort)  -KM     Assistive Device (Bed Mobility) bed rails;head of bed elevated  -KM       Row Name 06/14/25 1219          Transfers    Transfers sit-stand transfer;stand-sit transfer;bed-chair transfer  -       Row Name 06/14/25 1219          Bed-Chair Transfer    Bed-Chair [place] (Transfers) minimum assist (75% patient effort)  -KM     Comment, (Bed-Chair Transfer) HHA  -       Row Name 06/14/25 1219          Sit-Stand Transfer    Sit-Stand [place] (Transfers) contact guard;minimum assist (75% patient effort)  -KM     Comment, (Sit-Stand Transfer) A  -       Row Name 06/14/25 1219          Stand-Sit Transfer    Stand-Sit [place] (Transfers) contact guard  -KM     Comment, (Stand-Sit Transfer) A  -       Row Name 06/14/25 1219          Gait/Stairs (Locomotion)    Gait/Stairs Locomotion gait/ambulation independence;gait/ambulation assistive device;distance ambulated  -KM     [place] Level (Gait) minimum assist (75% patient effort)  -KM     Assistive Device (Gait) --  [facility]  -     Patient was able to Ambulate yes  -KM     Distance in Feet (Gait) 20  -KM     Pattern (Gait) step-to  -KM     Deviations/Abnormal Patterns (Gait) gait speed decreased  -KM       Row Name 06/14/25 1219          Safety Issues/Impairments Affecting Functional Mobility    Impairments Affecting Function (Mobility) balance;cognition;endurance/activity tolerance;pain;range of motion (ROM);strength  -KM       Row Name 06/14/25 1219          Motor Skills    Comments,  Therapeutic Exercise seated ther-ex  -KM       Row Name             Wound 06/09/25 1511 Right shoulder Surgical Open Surgical Incision    Wound - Properties Group Placement Date: 06/09/25  -CE Placement Time: 1511 -CE Side: Right  -CE Location: shoulder  -CE Primary Wound Type: Surgical  -CE Secondary Wound Type - Surgical: Open Surg  -CE    Retired Wound - Properties Group Placement Date: 06/09/25  -CE Placement Time: 1511  -CE Side: Right  -CE Location: shoulder  -CE    Retired Wound - Properties Group Placement Date: 06/09/25  -CE Placement Time: 1511  -CE Side: Right  -CE Location: shoulder  -CE    Retired Wound - Properties Group Date first assessed: 06/09/25  -CE Time first assessed: 1511 -CE Side: Right  -CE Location: shoulder  -CE      Row Name 06/14/25 1219          Progress Summary (PT)    Daily Progress Summary (PT) Pt. was able to demonstrate improved functional mobility skills. She was able to ambulate w/ improved quality and distance. She tolerated increased activity. Pt. would continue to benefit from PT services.  -KM               User Key  (r) = Recorded By, (t) = Taken By, (c) = Cosigned By      Initials Name Provider Type    Julián Cunningham, RN Registered Nurse    Talat Vaughn, PT Physical Therapist                    Physical Therapy Education       Title: PT OT SLP Therapies (Done)       Topic: Physical Therapy (Done)       Point: Mobility training (Done)       Learning Progress Summary            Patient Acceptance, E,TB, VU by KM at 6/10/2025 1455                      Point: Home exercise program (Done)       Learning Progress Summary            Patient Acceptance, E,TB, VU by KM at 6/10/2025 1455                      Point: Body mechanics (Done)       Learning Progress Summary            Patient Acceptance, E,TB, VU by KM at 6/10/2025 1455                      Point: Precautions (Done)       Learning Progress Summary            Patient Acceptance, E,TB, VU by KM at 6/10/2025 1455                                       User Key       Initials Effective Dates Name Provider Type Discipline     05/24/22 -  Talat Trujillo PT Physical Therapist PT                  PT Recommendation and Plan  Anticipated Discharge Disposition (PT): inpatient rehabilitation facility  Planned Therapy Interventions (PT): balance training, bed mobility training, gait training, home exercise program, patient/family education, postural re-education, ROM (range of motion), strengthening, stretching, transfer training  Therapy Frequency (PT): 5 times/wk (5x/wk)  Progress Summary (PT)  Daily Progress Summary (PT): Pt. was able to demonstrate improved functional mobility skills. She was able to ambulate w/ improved quality and distance. She tolerated increased activity. Pt. would continue to benefit from PT services.  Plan of Care Reviewed With: patient  Outcome Evaluation: Pt. evaluation completed during PT session. She was able to perform functional mobility skills w/ Lachelle-modA x2. She was able to transfer but unable to ambulate at time of evaluation. Pt. would benefit from increased PT services at this time.       Time Calculation:    PT Charges       Row Name 06/14/25 1219             Time Calculation    PT Received On 06/14/25  -KM         Time Calculation- PT    Total Timed Code Minutes- PT 25 minute(s)  -KM                User Key  (r) = Recorded By, (t) = Taken By, (c) = Cosigned By      Initials Name Provider Type     Tlaat Trujillo PT Physical Therapist                  Therapy Charges for Today       Code Description Service Date Service Provider Modifiers Qty    34550921273 HC PT THERAPEUTIC ACT EA 15 MIN 6/13/2025 Talat Trujillo, PT GP 1    73248929154 HC PT THER PROC EA 15 MIN 6/13/2025 Talat Trujillo, PT GP 1    20198983838 HC PT THERAPEUTIC ACT EA 15 MIN 6/14/2025 Talat Trujillo, PT GP 1    25536939412 HC GAIT TRAINING EA 15 MIN 6/14/2025 Talat Trujillo, PT GP 1            PT G-Codes  AM-PAC 6 Clicks Score  (PT): 16    Talat Trujillo, PT  6/14/2025

## 2025-06-14 NOTE — PLAN OF CARE
Goal Outcome Evaluation:           Progress: improving  Outcome Evaluation: Pts VSS on RA. PRN pain medication given. Pt ambulated with x1 assist. No acute changes, Will cont with POC.

## 2025-06-15 PROCEDURE — 25010000002 DOXYCYCLINE 100 MG RECONSTITUTED SOLUTION 1 EACH VIAL: Performed by: INTERNAL MEDICINE

## 2025-06-15 PROCEDURE — 63710000001 PREDNISONE PER 5 MG: Performed by: ORTHOPAEDIC SURGERY

## 2025-06-15 PROCEDURE — 99231 SBSQ HOSP IP/OBS SF/LOW 25: CPT | Performed by: INTERNAL MEDICINE

## 2025-06-15 PROCEDURE — 25010000002 HEPARIN (PORCINE) PER 1000 UNITS: Performed by: INTERNAL MEDICINE

## 2025-06-15 PROCEDURE — 63710000001 ONDANSETRON ODT 4 MG TABLET DISPERSIBLE: Performed by: ORTHOPAEDIC SURGERY

## 2025-06-15 RX ORDER — OXYCODONE AND ACETAMINOPHEN 7.5; 325 MG/1; MG/1
1 TABLET ORAL ONCE AS NEEDED
Refills: 0 | Status: COMPLETED | OUTPATIENT
Start: 2025-06-15 | End: 2025-06-15

## 2025-06-15 RX ORDER — DOXYCYCLINE 100 MG/1
100 CAPSULE ORAL ONCE
Status: COMPLETED | OUTPATIENT
Start: 2025-06-15 | End: 2025-06-15

## 2025-06-15 RX ADMIN — GABAPENTIN 400 MG: 400 CAPSULE ORAL at 20:43

## 2025-06-15 RX ADMIN — HEPARIN SODIUM 5000 UNITS: 5000 INJECTION INTRAVENOUS; SUBCUTANEOUS at 05:18

## 2025-06-15 RX ADMIN — METOPROLOL SUCCINATE 25 MG: 25 TABLET, EXTENDED RELEASE ORAL at 08:17

## 2025-06-15 RX ADMIN — ONDANSETRON 4 MG: 4 TABLET, ORALLY DISINTEGRATING ORAL at 08:18

## 2025-06-15 RX ADMIN — GABAPENTIN 400 MG: 400 CAPSULE ORAL at 16:34

## 2025-06-15 RX ADMIN — FOLIC ACID 1000 MCG: 1 TABLET ORAL at 08:18

## 2025-06-15 RX ADMIN — HEPARIN SODIUM 5000 UNITS: 5000 INJECTION INTRAVENOUS; SUBCUTANEOUS at 14:15

## 2025-06-15 RX ADMIN — FAMOTIDINE 40 MG: 20 TABLET, FILM COATED ORAL at 08:17

## 2025-06-15 RX ADMIN — PREDNISONE 4 MG: 1 TABLET ORAL at 08:26

## 2025-06-15 RX ADMIN — LOSARTAN POTASSIUM 100 MG: 50 TABLET, FILM COATED ORAL at 08:17

## 2025-06-15 RX ADMIN — SENNOSIDES, DOCUSATE SODIUM 2 TABLET: 50; 8.6 TABLET, FILM COATED ORAL at 20:43

## 2025-06-15 RX ADMIN — HEPARIN SODIUM 5000 UNITS: 5000 INJECTION INTRAVENOUS; SUBCUTANEOUS at 20:43

## 2025-06-15 RX ADMIN — Medication 10 ML: at 20:44

## 2025-06-15 RX ADMIN — VALACYCLOVIR HYDROCHLORIDE 500 MG: 500 TABLET, FILM COATED ORAL at 08:17

## 2025-06-15 RX ADMIN — OXYCODONE AND ACETAMINOPHEN 1 TABLET: 7.5; 325 TABLET ORAL at 21:26

## 2025-06-15 RX ADMIN — DOXYCYCLINE 100 MG: 100 CAPSULE ORAL at 05:58

## 2025-06-15 RX ADMIN — GABAPENTIN 400 MG: 400 CAPSULE ORAL at 08:18

## 2025-06-15 RX ADMIN — SENNOSIDES, DOCUSATE SODIUM 2 TABLET: 50; 8.6 TABLET, FILM COATED ORAL at 08:16

## 2025-06-15 RX ADMIN — CYCLOBENZAPRINE 10 MG: 10 TABLET, FILM COATED ORAL at 20:43

## 2025-06-15 RX ADMIN — ROSUVASTATIN 20 MG: 20 TABLET, FILM COATED ORAL at 08:17

## 2025-06-15 RX ADMIN — OXYCODONE AND ACETAMINOPHEN 2 TABLET: 7.5; 325 TABLET ORAL at 11:06

## 2025-06-15 RX ADMIN — Medication 2000 UNITS: at 08:17

## 2025-06-15 NOTE — PROGRESS NOTES
[facility] HOSPITALIST PROGRESS NOTE     Patient Identification:  Name:  Tiara Stubbs  Age:  80 y.o.  Sex:  female  :  1945  MRN:  [id number]  Visit Number:  [id number]  ROOM: [address] [place] [address]     Primary Care Provider:  Paula Downye APRN    Length of stay in inpatient status:  8    Subjective     Chief Compliant:  No chief complaint on file.      History of Presenting Illness:    Patient denies any new complaints. She was sitting bedside in chair again noting hopeful she can go home after a few more days of therapy.     ROS:  Otherwise 10 point ROS negative other than documented above in HPI.     Objective     Current Hospital Meds:cholecalciferol, 2,000 Units, Oral, Daily  cyclobenzaprine, 10 mg, Oral, Nightly  doxycycline, 100 mg, Intravenous, Q12H  famotidine, 40 mg, Oral, Daily  folic acid, 1,000 mcg, Oral, Daily  gabapentin, 400 mg, Oral, TID  heparin (porcine), 5,000 Units, Subcutaneous, Q8H  losartan, 100 mg, Oral, Q24H  metoprolol succinate XL, 25 mg, Oral, Daily  predniSONE, 4 mg, Oral, Daily  rosuvastatin, 20 mg, Oral, Daily  senna-docusate sodium, 2 tablet, Oral, BID  sodium chloride, 10 mL, Intravenous, Q12H  valACYclovir, 500 mg, Oral, Daily         Current Antimicrobial Therapy:  Anti-Infectives (From admission, onward)      Ordered     Dose/Rate Route Frequency Start Stop    06/15/25 0551  doxycycline (MONODOX) capsule 100 mg        Ordering Provider: Jennifer Grey APRN    100 mg Oral Once 06/15/25 0645 06/15/25 0558    06/10/25 1527  cefTRIAXone (ROCEPHIN) 1,000 mg in sodium chloride 0.9 % 100 mL IVPB-VTB        Ordering Provider: Simone Coelho MD    1,000 mg  200 mL/hr over 30 Minutes Intravenous Every 24 Hours 06/10/25 1600 25 1733    06/10/25 1527  doxycycline (VIBRAMYCIN) 100 mg in sodium chloride 0.9 % 100 mL IVPB-VTB        Ordering Provider: Simone Coelho MD    100 mg Intravenous Every 12 Hours 06/10/25 1600 06/15/25 1559    25 1709  ceFAZolin 2000  mg IVPB in 100 mL NS (VTB)        Ordering Provider: Ameya Carlin MD    2,000 mg  over 30 Minutes Intravenous Every 8 Hours 06/09/25 2300 06/10/25 0646    06/09/25 1219  sodium chloride 3,000 mL with polymyxin B 1,500,000 Units, vancomycin 3,000 mg irrigation        Ordering Provider: Ameya Carlin MD     Irrigation Once 06/09/25 1230 06/09/25 1332    06/09/25 1050  ceFAZolin 2000 mg IVPB in 100 mL NS (VTB)        Ordering Provider: Ameya Carlin MD    2,000 mg  over 30 Minutes Intravenous Once 06/09/25 1052 06/09/25 1524    06/07/25 2021  valACYclovir (VALTREX) tablet 500 mg        Ordering Provider: Ameya Carlin MD    500 mg Oral Daily 06/08/25 0900 06/08/26 0859          Current Diuretic Therapy:  Diuretics (From admission, onward)      None          ----------------------------------------------------------------------------------------------------------------------  Vital Signs:  Temp:  [98 °F (36.7 °C)-98.2 °F (36.8 °C)] 98.2 °F (36.8 °C)  Heart Rate:  [95] 95  Resp:  [16] 16  BP: (111-137)/(51-61) 137/61     on   ;   Device (Oxygen Therapy): room air  Body mass index is 30.16 kg/m².    Wt Readings from Last 3 Encounters:   06/10/25 87.4 kg (192 lb 9.6 oz)   12/05/24 89 kg (196 lb 3.2 oz)   05/23/24 88.8 kg (195 lb 12.8 oz)     Intake & Output (last 3 days)         06/12 0701  06/13 0700 06/13 0701  06/14 0700 06/14 0701  06/15 0700 06/15 0701 06/16 0700    P.O. 1200 1800 1440     I.V. (mL/kg) 198 (2.3) 0 (0)      Total Intake(mL/kg) 1398 (16) 1800 (20.6) 1440 (16.5)     Urine (mL/kg/hr) 2750 (1.3) 950 (0.5) 800 (0.4)     Stool 0 0 0     Total Output 2750 950 800     Net -1352 +850 +640             Urine Unmeasured Occurrence 2 x 2 x 3 x 1 x    Stool Unmeasured Occurrence 1 x 1 x 1 x           Diet: Regular/House; Fluid Consistency: Thin (IDDSI 0)  ----------------------------------------------------------------------------------------------------------------------  Physical  exam:  Constitutional:  Elderly appearing male.   HENT:  Head:  Normocephalic and atraumatic.  Mouth:  Moist mucous membranes.    Eyes:  Conjunctivae and EOM are normal. No scleral icterus.    Neck:  Neck supple.  No JVD present.    Cardiovascular:  Normal rate, regular rhythm and normal heart sounds with no murmur.  Pulmonary/Chest:  No respiratory distress, no wheezes, no crackles, with normal breath sounds and good air movement.  Abdominal:  Soft.  Bowel sounds are normal.  No distension and no tenderness.   Musculoskeletal:  R shoulder bandage in place.   Neurological:  Alert and oriented to person, place, but not time.  No cranial nerve deficit.  No tongue deviation.  No facial droop.  No slurred speech.   Skin:  Skin is warm and dry. No rash noted. No pallor.   Peripheral vascular:  Pulses in all 4 extremities with no clubbing, no cyanosis, no edema.  ----------------------------------------------------------------------------------------------------------------------  Tele:    ----------------------------------------------------------------------------------------------------------------------  Results from last 7 days   Lab Units 06/14/25  0142 06/12/25  0201 06/11/25  0833 06/09/25  0722 06/09/25  0539   WBC 10*3/mm3 14.28* 11.22* 14.23*   < >  --    HEMOGLOBIN g/dL 9.7* 8.2* 9.3*   < >  --    HEMATOCRIT % 30.5* 27.1* 31.1*   < >  --    MCV fL 103.0* 106.7* 108.4*   < >  --    MCHC g/dL 31.8 30.3* 29.9*   < >  --    PLATELETS 10*3/mm3 207 193 194   < >  --    INR   --   --   --   --  1.04    < > = values in this interval not displayed.         Results from last 7 days   Lab Units 06/14/25  0142 06/12/25  0201 06/11/25  0833 06/10/25  0259 06/09/25  0722   SODIUM mmol/L 140 143 142 137 137   POTASSIUM mmol/L 4.7 4.4 3.9 4.0 4.1   MAGNESIUM mg/dL  --   --   --   --  2.4   CHLORIDE mmol/L 109* 115* 112* 107 105   CO2 mmol/L 20.4* 19.6* 20.5* 18.3* 18.9*   BUN mg/dL 20.0 21.1 28.9* 24.9* 16.7   CREATININE mg/dL  "0.86 0.97 1.17* 1.27* 0.78   CALCIUM mg/dL 8.6 7.8* 8.1* 8.0* 8.8   GLUCOSE mg/dL 82 84 99 121* 100*   ALBUMIN g/dL  --   --  2.9* 3.1*  --    BILIRUBIN mg/dL  --   --  0.4 0.4  --    ALK PHOS U/L  --   --  54 46  --    AST (SGOT) U/L  --   --  55* 42*  --    ALT (SGPT) U/L  --   --  14 18  --    Estimated Creatinine Clearance: 59.2 mL/min (by C-G formula based on SCr of 0.86 mg/dL).  No results found for: \"AMMONIA\"              No results found for: \"HGBA1C\", \"POCGLU\"  No results found for: \"TSH\", \"FREET4\"  No results found for: \"PREGTESTUR\", \"PREGSERUM\", \"HCG\", \"HCGQUANT\"  Pain Management Panel           No data to display              Brief Urine Lab Results  (Last result in the past 365 days)        Color   Clarity   Blood   Leuk Est   Nitrite   Protein   CREAT   Urine HCG        06/10/25 1448 Yellow   Clear   Moderate (2+)   Moderate (2+)   Negative   Trace                 No results found for: \"BLOODCX\"  Urine Culture   Date Value Ref Range Status   06/10/2025 No growth  Final     No results found for: \"WOUNDCX\"  No results found for: \"STOOLCX\"  No results found for: \"RESPCX\"  No results found for: \"AFBCX\"        I have personally looked at the labs and they are summarized above.  ----------------------------------------------------------------------------------------------------------------------  Detailed radiology reports for the last 24 hours:    Imaging Results (Last 24 Hours)       ** No results found for the last 24 hours. **          Assessment & Plan    #Right humerus fracture and right shoulder dislocation secondary to mechanical ground level fall  #Right fifth metatarsal fracture  #Hyperlipidemia - continue rosuvastatin  #RA  #Osteoarthritis  #Lupus  #Hypertension - BP well controlled. Continue home metoprolol and losartan.  #GERD - continue famotidine  #VERONIQUE  #Leukocytosis   #Human rhinovirus   #Mild hepatocellular transaminitis - Normalized  #MAURO - Suspect prerenal in post operative period, " improved with IVF and improved intake.   #Hospital delirium  #RLL pneumonia    #Mild postoperative anemia.      POD#5  total reverse shoulder arthroplasty. Surgery plans nonoperatively management of metatarsal fracture with boot and WBAT. Repeat labs in AM. PRN zofran. PRN pain control. PT/OT to evaluate today. Patient with RLL infiltrate on chest x-ray, possibly aspiration in perioperative period? Treated with 5 days of ceftriaxone/doxy. WBC count improving. Mentation overall improved and I suspect near baseline.      Code status: Full      Pending Inpatient rehab consulted. Short term SNF if declined vs. Home.     Simone Coelho MD  [facility] Hospitalist  06/15/25  15:05 EDT

## 2025-06-15 NOTE — PLAN OF CARE
Goal Outcome Evaluation:              Outcome Evaluation: Pt. A/Ox4 this shift, VSS on RA. Pt ambulated x1 assist to bathroom several times. Family present intermittently. Pt. c/o nausea and pain, medications administered per MAR. Pt denies any other concerns at this time, will continue with POC.

## 2025-06-15 NOTE — PLAN OF CARE
Goal Outcome Evaluation:              Outcome Evaluation: Patient has been resting in bed throughout the night. VSS on RA. PRN pain medication given. pt ambulated with x1 assist. will continue with POC

## 2025-06-16 PROCEDURE — 25010000002 HYDROMORPHONE PER 4 MG: Performed by: STUDENT IN AN ORGANIZED HEALTH CARE EDUCATION/TRAINING PROGRAM

## 2025-06-16 PROCEDURE — 25010000002 HEPARIN (PORCINE) PER 1000 UNITS: Performed by: INTERNAL MEDICINE

## 2025-06-16 PROCEDURE — 63710000001 PREDNISONE PER 5 MG: Performed by: ORTHOPAEDIC SURGERY

## 2025-06-16 PROCEDURE — 99232 SBSQ HOSP IP/OBS MODERATE 35: CPT | Performed by: STUDENT IN AN ORGANIZED HEALTH CARE EDUCATION/TRAINING PROGRAM

## 2025-06-16 PROCEDURE — 97116 GAIT TRAINING THERAPY: CPT

## 2025-06-16 PROCEDURE — 97530 THERAPEUTIC ACTIVITIES: CPT

## 2025-06-16 RX ORDER — HYDROCODONE BITARTRATE AND ACETAMINOPHEN 7.5; 325 MG/1; MG/1
1 TABLET ORAL EVERY 6 HOURS PRN
Refills: 0 | Status: DISCONTINUED | OUTPATIENT
Start: 2025-06-16 | End: 2025-06-16

## 2025-06-16 RX ORDER — HYDROCODONE BITARTRATE AND ACETAMINOPHEN 10; 325 MG/1; MG/1
1 TABLET ORAL EVERY 6 HOURS PRN
Refills: 0 | Status: DISCONTINUED | OUTPATIENT
Start: 2025-06-16 | End: 2025-06-17 | Stop reason: HOSPADM

## 2025-06-16 RX ORDER — HYDROMORPHONE HYDROCHLORIDE 1 MG/ML
0.5 INJECTION, SOLUTION INTRAMUSCULAR; INTRAVENOUS; SUBCUTANEOUS
Refills: 0 | Status: DISCONTINUED | OUTPATIENT
Start: 2025-06-16 | End: 2025-06-17 | Stop reason: HOSPADM

## 2025-06-16 RX ADMIN — HEPARIN SODIUM 5000 UNITS: 5000 INJECTION INTRAVENOUS; SUBCUTANEOUS at 20:39

## 2025-06-16 RX ADMIN — HYDROMORPHONE HYDROCHLORIDE 0.5 MG: 1 INJECTION, SOLUTION INTRAMUSCULAR; INTRAVENOUS; SUBCUTANEOUS at 13:23

## 2025-06-16 RX ADMIN — GABAPENTIN 400 MG: 400 CAPSULE ORAL at 20:38

## 2025-06-16 RX ADMIN — GABAPENTIN 400 MG: 400 CAPSULE ORAL at 16:03

## 2025-06-16 RX ADMIN — HYDROCODONE BITARTRATE AND ACETAMINOPHEN 1 TABLET: 7.5; 325 TABLET ORAL at 20:38

## 2025-06-16 RX ADMIN — ROSUVASTATIN 20 MG: 20 TABLET, FILM COATED ORAL at 08:28

## 2025-06-16 RX ADMIN — FOLIC ACID 1000 MCG: 1 TABLET ORAL at 08:29

## 2025-06-16 RX ADMIN — HYDROMORPHONE HYDROCHLORIDE 0.5 MG: 1 INJECTION, SOLUTION INTRAMUSCULAR; INTRAVENOUS; SUBCUTANEOUS at 23:19

## 2025-06-16 RX ADMIN — Medication 10 ML: at 20:39

## 2025-06-16 RX ADMIN — GABAPENTIN 400 MG: 400 CAPSULE ORAL at 08:29

## 2025-06-16 RX ADMIN — LOSARTAN POTASSIUM 100 MG: 50 TABLET, FILM COATED ORAL at 08:26

## 2025-06-16 RX ADMIN — HEPARIN SODIUM 5000 UNITS: 5000 INJECTION INTRAVENOUS; SUBCUTANEOUS at 13:23

## 2025-06-16 RX ADMIN — HEPARIN SODIUM 5000 UNITS: 5000 INJECTION INTRAVENOUS; SUBCUTANEOUS at 05:26

## 2025-06-16 RX ADMIN — FAMOTIDINE 40 MG: 20 TABLET, FILM COATED ORAL at 08:28

## 2025-06-16 RX ADMIN — Medication 10 ML: at 08:30

## 2025-06-16 RX ADMIN — PREDNISONE 4 MG: 1 TABLET ORAL at 08:29

## 2025-06-16 RX ADMIN — METOPROLOL SUCCINATE 25 MG: 25 TABLET, EXTENDED RELEASE ORAL at 08:28

## 2025-06-16 RX ADMIN — HYDROCODONE BITARTRATE AND ACETAMINOPHEN 1 TABLET: 7.5; 325 TABLET ORAL at 11:49

## 2025-06-16 RX ADMIN — Medication 10 MG: at 23:28

## 2025-06-16 RX ADMIN — CYCLOBENZAPRINE 10 MG: 10 TABLET, FILM COATED ORAL at 20:38

## 2025-06-16 RX ADMIN — VALACYCLOVIR HYDROCHLORIDE 500 MG: 500 TABLET, FILM COATED ORAL at 08:29

## 2025-06-16 RX ADMIN — Medication 2000 UNITS: at 08:28

## 2025-06-16 NOTE — DISCHARGE PLACEMENT REQUEST
"Tiara Stubbs (80 y.o. Female)       Date of Birth   1945    Social Security Number       Address   [address]  [place] [id number]    Home Phone   [phone]    MRN   [id number]       [facility]    Marital Status                               Admission Date   2025    Admission Type   Urgent    Admitting Provider   Simone Coelho MD    Attending Provider   Amanda Pineda DO    Department, Room/Bed   [address], [facility]/       Discharge Date       Discharge Disposition       Discharge Destination                                 Attending Provider: Amanda Pienda DO    Allergies: Codeine, Stadol [Butorphanol], Vistaril [Hydroxyzine Hcl]    Isolation: None   Infection: None   Code Status: CPR    Ht: 170.2 cm (67.01\")   Wt: 87.4 kg (192 lb 9.6 oz)    Admission Cmt: None   Principal Problem: Shoulder fracture, right, closed, initial encounter [S42.91XA]                   Active Insurance as of 2025       Primary Coverage       Payor Plan Insurance Group Employer/Plan Group    [employer] MEDICARE REPLACEMENT HU[employer] MEDICARE ADVANTAGE PPO [id number]       Payor Plan Address Payor Plan Phone Number Payor Plan Fax Number Effective Dates    [address] [address] [phone]  2025 - None Entered    Formerly [facility] [id number]         Subscriber Name Subscriber Birth Date Member ID       TIARA STUBBS 1945 [id number]                     Emergency Contacts        (Rel.) Home Phone Work Phone Mobile Phone    Keya Quinonez (Daughter) [phone] -- --                 Physician Progress Notes (most recent note)        Simone Coelho MD at 06/15/25 1505              [facility] HOSPITALIST PROGRESS NOTE     Patient Identification:  Name:  Tiara Stubbs  Age:  80 y.o.  Sex:  female  :  1945  MRN:  [id number]  Visit Number:  [id number]  ROOM: [address] [place] [address]     Primary Care Provider:  Paula Downey APRN    Length of stay in inpatient " status:  8    Subjective     Chief Compliant:  No chief complaint on file.      History of Presenting Illness:    Patient denies any new complaints. She was sitting bedside in chair again noting hopeful she can go home after a few more days of therapy.     ROS:  Otherwise 10 point ROS negative other than documented above in HPI.     Objective     Current Hospital Meds:cholecalciferol, 2,000 Units, Oral, Daily  cyclobenzaprine, 10 mg, Oral, Nightly  doxycycline, 100 mg, Intravenous, Q12H  famotidine, 40 mg, Oral, Daily  folic acid, 1,000 mcg, Oral, Daily  gabapentin, 400 mg, Oral, TID  heparin (porcine), 5,000 Units, Subcutaneous, Q8H  losartan, 100 mg, Oral, Q24H  metoprolol succinate XL, 25 mg, Oral, Daily  predniSONE, 4 mg, Oral, Daily  rosuvastatin, 20 mg, Oral, Daily  senna-docusate sodium, 2 tablet, Oral, BID  sodium chloride, 10 mL, Intravenous, Q12H  valACYclovir, 500 mg, Oral, Daily         Current Antimicrobial Therapy:  Anti-Infectives (From admission, onward)      Ordered     Dose/Rate Route Frequency Start Stop    06/15/25 0551  doxycycline (MONODOX) capsule 100 mg        Ordering Provider: Jennifer Grey APRN    100 mg Oral Once 06/15/25 0645 06/15/25 0558    06/10/25 1527  cefTRIAXone (ROCEPHIN) 1,000 mg in sodium chloride 0.9 % 100 mL IVPB-VTB        Ordering Provider: Simone Coelho MD    1,000 mg  200 mL/hr over 30 Minutes Intravenous Every 24 Hours 06/10/25 1600 06/14/25 1733    06/10/25 1527  doxycycline (VIBRAMYCIN) 100 mg in sodium chloride 0.9 % 100 mL IVPB-VTB        Ordering Provider: Simone Coelho MD    100 mg Intravenous Every 12 Hours 06/10/25 1600 06/15/25 1559    06/09/25 1709  ceFAZolin 2000 mg IVPB in 100 mL NS (VTB)        Ordering Provider: Ameya Carlin MD    2,000 mg  over 30 Minutes Intravenous Every 8 Hours 06/09/25 2300 06/10/25 0646    06/09/25 1219  sodium chloride 3,000 mL with polymyxin B 1,500,000 Units, vancomycin 3,000 mg irrigation        Ordering Provider:  Ameya Carlin MD     Irrigation Once 06/09/25 1230 06/09/25 1332    06/09/25 1050  ceFAZolin 2000 mg IVPB in 100 mL NS (VTB)        Ordering Provider: Ameya Carlin MD    2,000 mg  over 30 Minutes Intravenous Once 06/09/25 1052 06/09/25 1524    06/07/25 2021  valACYclovir (VALTREX) tablet 500 mg        Ordering Provider: Ameya Carlin MD    500 mg Oral Daily 06/08/25 0900 06/08/26 0859          Current Diuretic Therapy:  Diuretics (From admission, onward)      None          ----------------------------------------------------------------------------------------------------------------------  Vital Signs:  Temp:  [98 °F (36.7 °C)-98.2 °F (36.8 °C)] 98.2 °F (36.8 °C)  Heart Rate:  [95] 95  Resp:  [16] 16  BP: (111-137)/(51-61) 137/61     on   ;   Device (Oxygen Therapy): room air  Body mass index is 30.16 kg/m².    Wt Readings from Last 3 Encounters:   06/10/25 87.4 kg (192 lb 9.6 oz)   12/05/24 89 kg (196 lb 3.2 oz)   05/23/24 88.8 kg (195 lb 12.8 oz)     Intake & Output (last 3 days)         06/12 0701 06/13 0700 06/13 0701 06/14 0700 06/14 0701  06/15 0700 06/15 0701 06/16 0700    P.O. 1200 1800 1440     I.V. (mL/kg) 198 (2.3) 0 (0)      Total Intake(mL/kg) 1398 (16) 1800 (20.6) 1440 (16.5)     Urine (mL/kg/hr) 2750 (1.3) 950 (0.5) 800 (0.4)     Stool 0 0 0     Total Output 2750 950 800     Net -1352 +850 +640             Urine Unmeasured Occurrence 2 x 2 x 3 x 1 x    Stool Unmeasured Occurrence 1 x 1 x 1 x           Diet: Regular/House; Fluid Consistency: Thin (IDDSI 0)  ----------------------------------------------------------------------------------------------------------------------  Physical exam:  Constitutional:  Elderly appearing male.   HENT:  Head:  Normocephalic and atraumatic.  Mouth:  Moist mucous membranes.    Eyes:  Conjunctivae and EOM are normal. No scleral icterus.    Neck:  Neck supple.  No JVD present.    Cardiovascular:  Normal rate, regular rhythm and normal heart sounds  with no murmur.  Pulmonary/Chest:  No respiratory distress, no wheezes, no crackles, with normal breath sounds and good air movement.  Abdominal:  Soft.  Bowel sounds are normal.  No distension and no tenderness.   Musculoskeletal:  R shoulder bandage in place.   Neurological:  Alert and oriented to person, place, but not time.  No cranial nerve deficit.  No tongue deviation.  No facial droop.  No slurred speech.   Skin:  Skin is warm and dry. No rash noted. No pallor.   Peripheral vascular:  Pulses in all 4 extremities with no clubbing, no cyanosis, no edema.  ----------------------------------------------------------------------------------------------------------------------  Tele:    ----------------------------------------------------------------------------------------------------------------------  Results from last 7 days   Lab Units 06/14/25  0142 06/12/25  0201 06/11/25  0833 06/09/25  0722 06/09/25  0539   WBC 10*3/mm3 14.28* 11.22* 14.23*   < >  --    HEMOGLOBIN g/dL 9.7* 8.2* 9.3*   < >  --    HEMATOCRIT % 30.5* 27.1* 31.1*   < >  --    MCV fL 103.0* 106.7* 108.4*   < >  --    MCHC g/dL 31.8 30.3* 29.9*   < >  --    PLATELETS 10*3/mm3 207 193 194   < >  --    INR   --   --   --   --  1.04    < > = values in this interval not displayed.         Results from last 7 days   Lab Units 06/14/25  0142 06/12/25  0201 06/11/25  0833 06/10/25  0259 06/09/25  0722   SODIUM mmol/L 140 143 142 137 137   POTASSIUM mmol/L 4.7 4.4 3.9 4.0 4.1   MAGNESIUM mg/dL  --   --   --   --  2.4   CHLORIDE mmol/L 109* 115* 112* 107 105   CO2 mmol/L 20.4* 19.6* 20.5* 18.3* 18.9*   BUN mg/dL 20.0 21.1 28.9* 24.9* 16.7   CREATININE mg/dL 0.86 0.97 1.17* 1.27* 0.78   CALCIUM mg/dL 8.6 7.8* 8.1* 8.0* 8.8   GLUCOSE mg/dL 82 84 99 121* 100*   ALBUMIN g/dL  --   --  2.9* 3.1*  --    BILIRUBIN mg/dL  --   --  0.4 0.4  --    ALK PHOS U/L  --   --  54 46  --    AST (SGOT) U/L  --   --  55* 42*  --    ALT (SGPT) U/L  --   --  14 18  --   "  Estimated Creatinine Clearance: 59.2 mL/min (by C-G formula based on SCr of 0.86 mg/dL).  No results found for: \"AMMONIA\"              No results found for: \"HGBA1C\", \"POCGLU\"  No results found for: \"TSH\", \"FREET4\"  No results found for: \"PREGTESTUR\", \"PREGSERUM\", \"HCG\", \"HCGQUANT\"  Pain Management Panel           No data to display              Brief Urine Lab Results  (Last result in the past 365 days)        Color   Clarity   Blood   Leuk Est   Nitrite   Protein   CREAT   Urine HCG        06/10/25 1448 Yellow   Clear   Moderate (2+)   Moderate (2+)   Negative   Trace                 No results found for: \"BLOODCX\"  Urine Culture   Date Value Ref Range Status   06/10/2025 No growth  Final     No results found for: \"WOUNDCX\"  No results found for: \"STOOLCX\"  No results found for: \"RESPCX\"  No results found for: \"AFBCX\"        I have personally looked at the labs and they are summarized above.  ----------------------------------------------------------------------------------------------------------------------  Detailed radiology reports for the last 24 hours:    Imaging Results (Last 24 Hours)       ** No results found for the last 24 hours. **          Assessment & Plan    #Right humerus fracture and right shoulder dislocation secondary to mechanical ground level fall  #Right fifth metatarsal fracture  #Hyperlipidemia - continue rosuvastatin  #RA  #Osteoarthritis  #Lupus  #Hypertension - BP well controlled. Continue home metoprolol and losartan.  #GERD - continue famotidine  #VERONIQUE  #Leukocytosis   #Human rhinovirus   #Mild hepatocellular transaminitis - Normalized  #MAURO - Suspect prerenal in post operative period, improved with IVF and improved intake.   #Hospital delirium  #RLL pneumonia    #Mild postoperative anemia.      POD#5  total reverse shoulder arthroplasty. Surgery plans nonoperatively management of metatarsal fracture with boot and WBAT. Repeat labs in AM. PRN zofran. PRN pain control. PT/OT to " evaluate today. Patient with RLL infiltrate on chest x-ray, possibly aspiration in perioperative period? Treated with 5 days of ceftriaxone/doxy. WBC count improving. Mentation overall improved and I suspect near baseline.      Code status: Full      Pending Inpatient rehab consulted. Short term SNF if declined vs. Home.     Simone Coelho MD  [facility] Hospitalist  06/15/25  15:05 EDT    Electronically signed by Simone Coelho MD at 06/15/25 1559          Physical Therapy Notes (most recent note)        Talat Trujillo, PT at 25 1257  Version 1 of 1         Acute Care - Physical Therapy Treatment Note  [facility]     Patient Name: Tiara Stubbs  : 1945  MRN: [id number]  Today's Date: 2025      Visit Dx:     ICD-10-CM ICD-9-CM   1. Shoulder fracture, right, closed, initial encounter  S42.91XA 812.00   2. Closed fracture of right foot, initial encounter  S92.901A 825.20   3. Acute right ankle pain  M25.571 719.47     338.19   4. Post-operative state  Z98.890 V45.89     Patient Active Problem List   Diagnosis    Overweight (BMI 25.0-29.9)    Immunization due    Chronic cough    SOB (shortness of breath)    Shoulder fracture, right, closed, initial encounter    Closed fracture of bone of right foot    Acute right ankle pain     Past Medical History:   Diagnosis Date    Acid reflux     Cancer     skin    Fibromyalgia     High cholesterol     Hx: recurrent pneumonia     Hypertension     Lupus     Osteoarthritis     Rheumatoid arthritis     Shoulder injury      Past Surgical History:   Procedure Laterality Date    CARPAL TUNNEL RELEASE Bilateral     KNEE SURGERY Right     TOTAL WRIST ARTHROPLASTY Right      PT Assessment (Last 12 Hours)       PT Evaluation and Treatment       Row Name 25 1255          Physical Therapy Time and Intention    Subjective Information no complaints  -KM     Document Type therapy note (daily note)  -KM     Mode of Treatment individual therapy;physical therapy   -KM     Patient Effort good  -KM     Symptoms Noted During/After Treatment increased pain  -KM       Row Name 06/16/25 1255          General Information    Patient Profile Reviewed yes  -KM     Patient Observations alert;cooperative;agree to therapy  -KM     Existing Precautions/Restrictions fall;shoulder;non-weight bearing;brace on at all times  -KM     Limitations/Impairments safety/cognitive  -KM       Row Name 06/16/25 1255          Pain    Pretreatment Pain Rating 10/10  -KM     Posttreatment Pain Rating 10/10  -KM     Pain Location shoulder  -KM     Pain Side/Orientation right  -KM       Row Name 06/16/25 1255          Cognition    Affect/Mental Status (Cognition) other (see comments)  pleasantly confused  -KM     Orientation Status (Cognition) oriented to;person;verbal cues/prompts needed for orientation  -KM     Follows Commands (Cognition) follows one-step commands  -KM       Row Name 06/16/25 1255          Transfers    Transfers sit-stand transfer;stand-sit transfer  -KM       Row Name 06/16/25 1255          Sit-Stand Transfer    Sit-Stand Elliott (Transfers) standby assist  -KM       Row Name 06/16/25 1255          Stand-Sit Transfer    Stand-Sit Elliott (Transfers) standby assist  -KM       Row Name 06/16/25 1255          Gait/Stairs (Locomotion)    Gait/Stairs Locomotion gait/ambulation independence;gait/ambulation assistive device;distance ambulated  -KM     Elliott Level (Gait) contact guard  -KM     Assistive Device (Gait) --  HHA  -KM     Patient was able to Ambulate yes  -KM     Distance in Feet (Gait) 120  -KM     Pattern (Gait) step-to  -KM     Deviations/Abnormal Patterns (Gait) gait speed decreased  -KM       Row Name 06/16/25 1255          Safety Issues/Impairments Affecting Functional Mobility    Impairments Affecting Function (Mobility) balance;cognition;endurance/activity tolerance;pain;range of motion (ROM);strength  -KM       Row Name             Wound 06/09/25 1511 Right  shoulder Surgical Open Surgical Incision    Wound - Properties Group Placement Date: 06/09/25  -CE Placement Time: 1511 -CE Side: Right  -CE Location: shoulder  -CE Primary Wound Type: Surgical  -CE Secondary Wound Type - Surgical: Open Surg  -CE    Retired Wound - Properties Group Placement Date: 06/09/25  -CE Placement Time: 1511 -CE Side: Right  -CE Location: shoulder  -CE    Retired Wound - Properties Group Placement Date: 06/09/25  -CE Placement Time: 1511 -CE Side: Right  -CE Location: shoulder  -CE    Retired Wound - Properties Group Date first assessed: 06/09/25  -CE Time first assessed: 1511 -CE Side: Right  -CE Location: shoulder  -CE      Row Name 06/16/25 1255          Progress Summary (PT)    Daily Progress Summary (PT) Pt. was able to demonstrate functional mobility skills w/ SBA. She was able to ambulate improved distance and quality w/ HHA. Pt. continues to  demonstrate improved mobility skills.  -KM               User Key  (r) = Recorded By, (t) = Taken By, (c) = Cosigned By      Initials Name Provider Type    CE Julián Seaman, RN Registered Nurse    Talat Vaughn, PT Physical Therapist                    Physical Therapy Education       Title: PT OT SLP Therapies (Done)       Topic: Physical Therapy (Done)       Point: Mobility training (Done)       Learning Progress Summary            Patient Acceptance, E,TB, VU by KM at 6/10/2025 1455                      Point: Home exercise program (Done)       Learning Progress Summary            Patient Acceptance, E,TB, VU by KM at 6/10/2025 1455                      Point: Body mechanics (Done)       Learning Progress Summary            Patient Acceptance, E,TB, VU by KM at 6/10/2025 1455                      Point: Precautions (Done)       Learning Progress Summary            Patient Acceptance, E,TB, VU by ROSALBA at 6/10/2025 1455                                      User Key       Initials Effective Dates Name Provider Type Discipline    ROSALBA  05/24/22 -  Talat Trujillo, PT Physical Therapist PT                  PT Recommendation and Plan  Anticipated Discharge Disposition (PT): inpatient rehabilitation facility  Planned Therapy Interventions (PT): balance training, bed mobility training, gait training, home exercise program, patient/family education, postural re-education, ROM (range of motion), strengthening, stretching, transfer training  Therapy Frequency (PT): 5 times/wk (5x/wk)  Progress Summary (PT)  Daily Progress Summary (PT): Pt. was able to demonstrate functional mobility skills w/ SBA. She was able to ambulate improved distance and quality w/ HHA. Pt. continues to  demonstrate improved mobility skills.  Plan of Care Reviewed With: patient  Outcome Evaluation: Pt. evaluation completed during PT session. She was able to perform functional mobility skills w/ Lachelle-modA x2. She was able to transfer but unable to ambulate at time of evaluation. Pt. would benefit from increased PT services at this time.       Time Calculation:    PT Charges       Row Name 06/16/25 1253             Time Calculation    PT Received On 06/16/25  -KM         Time Calculation- PT    Total Timed Code Minutes- PT 23 minute(s)  -KM                User Key  (r) = Recorded By, (t) = Taken By, (c) = Cosigned By      Initials Name Provider Type    KM Talat Trujillo, PT Physical Therapist                  Therapy Charges for Today       Code Description Service Date Service Provider Modifiers Qty    54879952039 HC PT THERAPEUTIC ACT EA 15 MIN 6/16/2025 Talat Trujillo, PT GP 1    30624439126 HC GAIT TRAINING EA 15 MIN 6/16/2025 Talat Trujillo, PT GP 1            PT G-Codes  AM-PAC 6 Clicks Score (PT): 20    Talat Trujillo PT  6/16/2025      Electronically signed by Talat Trujillo, PT at 06/16/25 1257

## 2025-06-16 NOTE — PLAN OF CARE
Goal Outcome Evaluation:  Plan of Care Reviewed With: patient        Progress: improving  Outcome Evaluation: Patient resting in bed at this time. VSS on RA. Patient ambulated X 1 assist this shift. Patient had c/o pain. NP ordered one time dose of pain medication. See MAR. Patient voices no concerns at this time. Will continue with plan of care.

## 2025-06-16 NOTE — PLAN OF CARE
Goal Outcome Evaluation:              Outcome Evaluation: Pt VSS on RA this shift, A/Ox4. Pt ambulated in hallway with PT and x1 assist in room multiple times. Pt c/o pain, PRN medications administered per MAR. Pt denies any other concerns at this time, will continue with POC.

## 2025-06-16 NOTE — CASE MANAGEMENT/SOCIAL WORK
Discharge Planning Assessment   [place]     Patient Name: Tiara Stubbs  MRN: [id number]  Today's Date: 6/16/2025    Admit Date: 6/7/2025         Discharge Plan       Row Name 06/16/25 0909       Plan    Plan SS was notified by Pt's [employer] Medicare replacement per Jeanne [phone] that additional information is needed on appointment of representative form to fax [phone]. SS to follow.    15:37pm: SS left message for [employer] Medicare Replacement expedited appeal RN Jeanne on this date. Appeal remains pending at this time. SS to follow.     15:45pm: SS provided an updated with Pt's dtr Keya on this date. SS to follow.                   Continued Care and Services - Admitted Since 6/7/2025       Destination       Service Provider Request Status Services Address Phone Fax Patient Preferred     [facility] Considering -- [address] [place] [id number] [phone] --       Internal Comment last updated by Malina Starks BSW 6/12/2025 1539    Peer to peer denied. Appeal remains pending at this time.                                KARLY Robertson

## 2025-06-16 NOTE — THERAPY TREATMENT NOTE
Acute Care - Physical Therapy Treatment Note   Kaiden     Patient Name: Tiara Stubbs  : 1945  MRN: [id number]  Today's Date: 2025      Visit Dx:     ICD-10-CM ICD-9-CM   1. Shoulder fracture, right, closed, initial encounter  S42.91XA 812.00   2. Closed fracture of right foot, initial encounter  S92.901A 825.20   3. Acute right ankle pain  M25.571 719.47     338.19   4. Post-operative state  Z98.890 V45.89     Patient Active Problem List   Diagnosis    Overweight (BMI 25.0-29.9)    Immunization due    Chronic cough    SOB (shortness of breath)    Shoulder fracture, right, closed, initial encounter    Closed fracture of bone of right foot    Acute right ankle pain     Past Medical History:   Diagnosis Date    Acid reflux     Cancer     skin    Fibromyalgia     High cholesterol     Hx: recurrent pneumonia     Hypertension     Lupus     Osteoarthritis     Rheumatoid arthritis     Shoulder injury      Past Surgical History:   Procedure Laterality Date    CARPAL TUNNEL RELEASE Bilateral     KNEE SURGERY Right     TOTAL WRIST ARTHROPLASTY Right      PT Assessment (Last 12 Hours)       PT Evaluation and Treatment       Row Name 25 1255          Physical Therapy Time and Intention    Subjective Information no complaints  -KM     Document Type therapy note (daily note)  -KM     Mode of Treatment individual therapy;physical therapy  -KM     Patient Effort good  -KM     Symptoms Noted During/After Treatment increased pain  -KM       Row Name 25 1254          General Information    Patient Profile Reviewed yes  -KM     Patient Observations alert;cooperative;agree to therapy  -KM     Existing Precautions/Restrictions fall;shoulder;non-weight bearing;brace on at all times  -KM     Limitations/Impairments safety/cognitive  -KM       Row Name 25 1251          Pain    Pretreatment Pain Rating 10/10  -KM     Posttreatment Pain Rating 10/10  -KM     Pain Location shoulder  -KM     Pain  Side/Orientation right  -KM       Row Name 06/16/25 1255          Cognition    Affect/Mental Status (Cognition) other (see comments)  pleasantly confused  -KM     Orientation Status (Cognition) oriented to;person;verbal cues/prompts needed for orientation  -KM     Follows Commands (Cognition) follows one-step commands  -KM       Row Name 06/16/25 1255          Transfers    Transfers sit-stand transfer;stand-sit transfer  -KM       Row Name 06/16/25 1255          Sit-Stand Transfer    Sit-Stand [place] (Transfers) standby assist  -KM       Row Name 06/16/25 1255          Stand-Sit Transfer    Stand-Sit [place] (Transfers) standby assist  -KM       Row Name 06/16/25 1255          Gait/Stairs (Locomotion)    Gait/Stairs Locomotion gait/ambulation independence;gait/ambulation assistive device;distance ambulated  -KM     [place] Level (Gait) contact guard  -KM     Assistive Device (Gait) --  HHA  -KM     Patient was able to Ambulate yes  -KM     Distance in Feet (Gait) 120  -KM     Pattern (Gait) step-to  -KM     Deviations/Abnormal Patterns (Gait) gait speed decreased  -KM       Row Name 06/16/25 1255          Safety Issues/Impairments Affecting Functional Mobility    Impairments Affecting Function (Mobility) balance;cognition;endurance/activity tolerance;pain;range of motion (ROM);strength  -KM       Row Name             Wound 06/09/25 1511 Right shoulder Surgical Open Surgical Incision    Wound - Properties Group Placement Date: 06/09/25  -CE Placement Time: 1511 -CE Side: Right  -CE Location: shoulder  -CE Primary Wound Type: Surgical  -CE Secondary Wound Type - Surgical: Open Surg  -CE    Retired Wound - Properties Group Placement Date: 06/09/25  -CE Placement Time: 1511 -CE Side: Right  -CE Location: shoulder  -CE    Retired Wound - Properties Group Placement Date: 06/09/25  -CE Placement Time: 1511 -CE Side: Right  -CE Location: shoulder  -CE    Retired Wound - Properties Group Date first  assessed: 06/09/25  -CE Time first assessed: 1511  -CE Side: Right  -CE Location: shoulder  -CE      Row Name 06/16/25 1255          Progress Summary (PT)    Daily Progress Summary (PT) Pt. was able to demonstrate functional mobility skills w/ SBA. She was able to ambulate improved distance and quality w/ HHA. Pt. continues to  demonstrate improved mobility skills.  -               User Key  (r) = Recorded By, (t) = Taken By, (c) = Cosigned By      Initials Name Provider Type    CE Julián Seaman, RN Registered Nurse    Talat Vaughn, SHERRI Physical Therapist                    Physical Therapy Education       Title: PT OT SLP Therapies (Done)       Topic: Physical Therapy (Done)       Point: Mobility training (Done)       Learning Progress Summary            Patient Acceptance, E,TB, VU by  at 6/10/2025 1455                      Point: Home exercise program (Done)       Learning Progress Summary            Patient Acceptance, E,TB, VU by  at 6/10/2025 1455                      Point: Body mechanics (Done)       Learning Progress Summary            Patient Acceptance, E,TB, VU by  at 6/10/2025 1455                      Point: Precautions (Done)       Learning Progress Summary            Patient Acceptance, E,TB, VU by  at 6/10/2025 1455                                      User Key       Initials Effective Dates Name Provider Type Discipline    ROSALBA 05/24/22 -  Talat Trujillo, SHERRI Physical Therapist PT                  PT Recommendation and Plan  Anticipated Discharge Disposition (PT): inpatient rehabilitation facility  Planned Therapy Interventions (PT): balance training, bed mobility training, gait training, home exercise program, patient/family education, postural re-education, ROM (range of motion), strengthening, stretching, transfer training  Therapy Frequency (PT): 5 times/wk (5x/wk)  Progress Summary (PT)  Daily Progress Summary (PT): Pt. was able to demonstrate functional mobility skills w/ SBA. She  was able to ambulate improved distance and quality w/ HHA. Pt. continues to  demonstrate improved mobility skills.  Plan of Care Reviewed With: patient  Outcome Evaluation: Pt. evaluation completed during PT session. She was able to perform functional mobility skills w/ [place]-modA x2. She was able to transfer but unable to ambulate at time of evaluation. Pt. would benefit from increased PT services at this time.       Time Calculation:    PT Charges       Row Name 06/16/25 1253             Time Calculation    PT Received On 06/16/25  -KM         Time Calculation- PT    Total Timed Code Minutes- PT 23 minute(s)  -KM                User Key  (r) = Recorded By, (t) = Taken By, (c) = Cosigned By      Initials Name Provider Type    Talat Vaughn, PT Physical Therapist                  Therapy Charges for Today       Code Description Service Date Service Provider Modifiers Qty    77314765544 HC PT THERAPEUTIC ACT EA 15 MIN 6/16/2025 Talat Trujillo, PT GP 1    64642490599 HC GAIT TRAINING EA 15 MIN 6/16/2025 Talat Trujillo, PT GP 1            PT G-Codes  AM-PAC 6 Clicks Score (PT): 20    Talat Trujillo PT  6/16/2025

## 2025-06-17 ENCOUNTER — READMISSION MANAGEMENT (OUTPATIENT)
Dept: CALL CENTER | Facility: HOSPITAL | Age: 80
End: 2025-06-17
Payer: MEDICARE

## 2025-06-17 VITALS
HEART RATE: 102 BPM | TEMPERATURE: 98.1 F | WEIGHT: 192.6 LBS | DIASTOLIC BLOOD PRESSURE: 54 MMHG | OXYGEN SATURATION: 98 % | RESPIRATION RATE: 16 BRPM | HEIGHT: 67 IN | BODY MASS INDEX: 30.23 KG/M2 | SYSTOLIC BLOOD PRESSURE: 110 MMHG

## 2025-06-17 LAB
ANION GAP SERPL CALCULATED.3IONS-SCNC: 14.3 MMOL/L (ref 5–15)
BASOPHILS # BLD AUTO: 0.06 10*3/MM3 (ref 0–0.2)
BASOPHILS NFR BLD AUTO: 0.5 % (ref 0–1.5)
BUN SERPL-MCNC: 21.5 MG/DL (ref 8–23)
BUN/CREAT SERPL: 21.7 (ref 7–25)
CALCIUM SPEC-SCNC: 8.7 MG/DL (ref 8.6–10.5)
CHLORIDE SERPL-SCNC: 105 MMOL/L (ref 98–107)
CO2 SERPL-SCNC: 20.7 MMOL/L (ref 22–29)
CREAT SERPL-MCNC: 0.99 MG/DL (ref 0.57–1)
DEPRECATED RDW RBC AUTO: 54.9 FL (ref 37–54)
EGFRCR SERPLBLD CKD-EPI 2021: 57.8 ML/MIN/1.73
EOSINOPHIL # BLD AUTO: 0.46 10*3/MM3 (ref 0–0.4)
EOSINOPHIL NFR BLD AUTO: 3.5 % (ref 0.3–6.2)
ERYTHROCYTE [DISTWIDTH] IN BLOOD BY AUTOMATED COUNT: 14.6 % (ref 12.3–15.4)
GLUCOSE SERPL-MCNC: 108 MG/DL (ref 65–99)
HCT VFR BLD AUTO: 34.5 % (ref 34–46.6)
HGB BLD-MCNC: 10.8 G/DL (ref 12–15.9)
IMM GRANULOCYTES # BLD AUTO: 0.23 10*3/MM3 (ref 0–0.05)
IMM GRANULOCYTES NFR BLD AUTO: 1.7 % (ref 0–0.5)
LYMPHOCYTES # BLD AUTO: 4.09 10*3/MM3 (ref 0.7–3.1)
LYMPHOCYTES NFR BLD AUTO: 30.8 % (ref 19.6–45.3)
MACROCYTES BLD QL SMEAR: NORMAL
MCH RBC QN AUTO: 32.4 PG (ref 26.6–33)
MCHC RBC AUTO-ENTMCNC: 31.3 G/DL (ref 31.5–35.7)
MCV RBC AUTO: 103.6 FL (ref 79–97)
MONOCYTES # BLD AUTO: 1.25 10*3/MM3 (ref 0.1–0.9)
MONOCYTES NFR BLD AUTO: 9.4 % (ref 5–12)
NEUTROPHILS NFR BLD AUTO: 54.1 % (ref 42.7–76)
NEUTROPHILS NFR BLD AUTO: 7.2 10*3/MM3 (ref 1.7–7)
NRBC BLD AUTO-RTO: 0.2 /100 WBC (ref 0–0.2)
PLAT MORPH BLD: NORMAL
PLATELET # BLD AUTO: 302 10*3/MM3 (ref 140–450)
PMV BLD AUTO: 10.5 FL (ref 6–12)
POTASSIUM SERPL-SCNC: 3.8 MMOL/L (ref 3.5–5.2)
RBC # BLD AUTO: 3.33 10*6/MM3 (ref 3.77–5.28)
SODIUM SERPL-SCNC: 140 MMOL/L (ref 136–145)
WBC NRBC COR # BLD AUTO: 13.29 10*3/MM3 (ref 3.4–10.8)

## 2025-06-17 PROCEDURE — 97535 SELF CARE MNGMENT TRAINING: CPT

## 2025-06-17 PROCEDURE — 25010000002 HYDROMORPHONE PER 4 MG: Performed by: STUDENT IN AN ORGANIZED HEALTH CARE EDUCATION/TRAINING PROGRAM

## 2025-06-17 PROCEDURE — 97116 GAIT TRAINING THERAPY: CPT

## 2025-06-17 PROCEDURE — 85007 BL SMEAR W/DIFF WBC COUNT: CPT | Performed by: STUDENT IN AN ORGANIZED HEALTH CARE EDUCATION/TRAINING PROGRAM

## 2025-06-17 PROCEDURE — 80048 BASIC METABOLIC PNL TOTAL CA: CPT | Performed by: STUDENT IN AN ORGANIZED HEALTH CARE EDUCATION/TRAINING PROGRAM

## 2025-06-17 PROCEDURE — 85025 COMPLETE CBC W/AUTO DIFF WBC: CPT | Performed by: STUDENT IN AN ORGANIZED HEALTH CARE EDUCATION/TRAINING PROGRAM

## 2025-06-17 PROCEDURE — 63710000001 PREDNISONE PER 5 MG: Performed by: ORTHOPAEDIC SURGERY

## 2025-06-17 RX ORDER — POLYETHYLENE GLYCOL 3350 17 G/17G
17 POWDER, FOR SOLUTION ORAL DAILY PRN
Qty: 510 G | Refills: 0 | Status: SHIPPED | OUTPATIENT
Start: 2025-06-17

## 2025-06-17 RX ADMIN — HYDROCODONE BITARTRATE AND ACETAMINOPHEN 1 TABLET: 10; 325 TABLET ORAL at 03:40

## 2025-06-17 RX ADMIN — FAMOTIDINE 40 MG: 20 TABLET, FILM COATED ORAL at 08:42

## 2025-06-17 RX ADMIN — HYDROMORPHONE HYDROCHLORIDE 0.5 MG: 1 INJECTION, SOLUTION INTRAMUSCULAR; INTRAVENOUS; SUBCUTANEOUS at 04:34

## 2025-06-17 RX ADMIN — PREDNISONE 4 MG: 1 TABLET ORAL at 08:43

## 2025-06-17 RX ADMIN — VALACYCLOVIR HYDROCHLORIDE 500 MG: 500 TABLET, FILM COATED ORAL at 08:42

## 2025-06-17 RX ADMIN — ROSUVASTATIN 20 MG: 20 TABLET, FILM COATED ORAL at 08:43

## 2025-06-17 RX ADMIN — Medication 2000 UNITS: at 08:42

## 2025-06-17 RX ADMIN — Medication 10 ML: at 08:43

## 2025-06-17 RX ADMIN — FOLIC ACID 1000 MCG: 1 TABLET ORAL at 08:43

## 2025-06-17 RX ADMIN — GABAPENTIN 400 MG: 400 CAPSULE ORAL at 08:47

## 2025-06-17 RX ADMIN — HYDROCODONE BITARTRATE AND ACETAMINOPHEN 1 TABLET: 10; 325 TABLET ORAL at 10:20

## 2025-06-17 RX ADMIN — LOSARTAN POTASSIUM 100 MG: 50 TABLET, FILM COATED ORAL at 08:52

## 2025-06-17 NOTE — THERAPY DISCHARGE NOTE
Acute Care - Occupational Therapy Treatment Note/Discharge   Kaiden     Patient Name: Tiara Stubbs  : 1945  MRN: [id number]  Today's Date: 2025               Admit Date: 2025       ICD-10-CM ICD-9-CM   1. Shoulder fracture, right, closed, initial encounter  S42.91XA 812.00   2. Closed fracture of right foot, initial encounter  S92.901A 825.20   3. Acute right ankle pain  M25.571 719.47     338.19   4. Post-operative state  Z98.890 V45.89     Patient Active Problem List   Diagnosis    Overweight (BMI 25.0-29.9)    Immunization due    Chronic cough    SOB (shortness of breath)    Shoulder fracture, right, closed, initial encounter    Closed fracture of bone of right foot    Acute right ankle pain     Past Medical History:   Diagnosis Date    Acid reflux     Cancer     skin    Fibromyalgia     High cholesterol     Hx: recurrent pneumonia     Hypertension     Lupus     Osteoarthritis     Rheumatoid arthritis     Shoulder injury      Past Surgical History:   Procedure Laterality Date    CARPAL TUNNEL RELEASE Bilateral     KNEE SURGERY Right     TOTAL WRIST ARTHROPLASTY Right        OT ASSESSMENT FLOWSHEET (Last 12 Hours)       OT Evaluation and Treatment       Row Name 25 1302                   OT Time and Intention    Subjective Information no complaints  -LM        Document Type discharge treatment  -LM        Mode of Treatment occupational therapy  -LM        Patient Effort good  -LM        Comment Patient seen this date for discharge education.  patient daughter present.  patient and daughter verbalized understanding of donning/doffing sling, badl levels and fxl transfers.  -LM           General Information    Existing Precautions/Restrictions fall;brace worn when out of bed;shoulder  sling, NWB RUE  -LM        Limitations/Impairments safety/cognitive  -LM           Pain Assessment    Pretreatment Pain Rating 0/10 - no pain  -LM        Posttreatment Pain Rating 0/10 - no pain  -LM            Cognition    Affect/Mental Status (Cognition) WFL  -LM        Orientation Status (Cognition) oriented to;person;place  -LM           Bathing Assessment/Intervention    [place] Level (Bathing) moderate assist (50% patient effort)  -LM           Upper Body Dressing Assessment/Training    [place] Level (Upper Body Dressing) moderate assist (50% patient effort);maximum assist (25% patient effort)  -LM           Lower Body Dressing Assessment/Training    [place] Level (Lower Body Dressing) maximum assist (25% patient effort)  -LM           Grooming Assessment/Training    [place] Level (Grooming) minimum assist (75% patient effort)  -LM           Self-Feeding Assessment/Training    [place] Level (Feeding) set up  -LM           Toileting Assessment/Training    [place] Level (Toileting) maximum assist (25% patient effort);moderate assist (50% patient effort)  -LM           Wound 06/09/25 1511 Right shoulder Surgical Open Surgical Incision    Wound - Properties Group Placement Date: 06/09/25  -CE Placement Time: 1511  -CE Side: Right  -CE Location: shoulder  -CE Primary Wound Type: Surgical  -CE Secondary Wound Type - Surgical: Open Surg  -CE    Retired Wound - Properties Group Placement Date: 06/09/25  -CE Placement Time: 1511  -CE Side: Right  -CE Location: shoulder  -CE    Retired Wound - Properties Group Placement Date: 06/09/25  -CE Placement Time: 1511  -CE Side: Right  -CE Location: shoulder  -CE    Retired Wound - Properties Group Date first assessed: 06/09/25  -CE Time first assessed: 1511  -CE Side: Right  -CE Location: shoulder  -CE       Positioning and Restraints    Post Treatment Position chair  -LM        In Chair encouraged to call for assist;with family/caregiver  -LM                  User Key  (r) = Recorded By, (t) = Taken By, (c) = Cosigned By      Initials Name Effective Dates    CE Julián Seaman, SAIMA 06/16/21 -     LM Tata Ruiz OT 06/16/21 -                          OT Recommendation and Plan                   Time Calculation:    Time Calculation- OT       Row Name 06/17/25 1440             Timed Charges    18083 - Gait Training Minutes  15  -CS         Total Minutes    Timed Charges Total Minutes 15  -CS       Total Minutes 15  -CS                User Key  (r) = Recorded By, (t) = Taken By, (c) = Cosigned By      Initials Name Provider Type    CS Petar Fontanez, PT Physical Therapist                    Therapy Charges for Today       Code Description Service Date Service Provider Modifiers Qty    41237325153 HC OT SELF CARE/MGMT/TRAIN EA 15 MIN 6/17/2025 Tata Ruiz, OT GO 1                 OT Discharge Summary  Anticipated Discharge Disposition (OT): home with 24/7 care  Reason for Discharge: Discharge from facility  Discharge Destination: Home with home health    Tata Ruiz OT  6/17/2025

## 2025-06-17 NOTE — PROGRESS NOTES
[facility] HOSPITALIST PROGRESS NOTE     Patient Identification:  Name:  Tiara Stubbs  Age:  80 y.o.  Sex:  female  :  1945  MRN:  [id number]  Visit Number:  [id number]  ROOM: [address] [place] [address]     Primary Care Provider:  Paula Downey APRN    Length of stay in inpatient status:  9    Subjective     History of Presenting Illness:    Patient reported significant pain in her shoulder but also chronic pain in her back at time of my exam today, which had not been improved by the dose of hydrocodone acetaminophen 7.5-325 she had received prior to my visit. She denied any shortness of breath or other new complaints.     Objective     Current Hospital Meds:cholecalciferol, 2,000 Units, Oral, Daily  cyclobenzaprine, 10 mg, Oral, Nightly  famotidine, 40 mg, Oral, Daily  folic acid, 1,000 mcg, Oral, Daily  gabapentin, 400 mg, Oral, TID  heparin (porcine), 5,000 Units, Subcutaneous, Q8H  losartan, 100 mg, Oral, Q24H  metoprolol succinate XL, 25 mg, Oral, Daily  predniSONE, 4 mg, Oral, Daily  rosuvastatin, 20 mg, Oral, Daily  senna-docusate sodium, 2 tablet, Oral, BID  sodium chloride, 10 mL, Intravenous, Q12H  valACYclovir, 500 mg, Oral, Daily         Current Antimicrobial Therapy:  Anti-Infectives (From admission, onward)      Ordered     Dose/Rate Route Frequency Start Stop    06/15/25 0551  doxycycline (MONODOX) capsule 100 mg        Ordering Provider: Jennifer Grey APRN    100 mg Oral Once 06/15/25 0645 06/15/25 0558    06/10/25 1527  cefTRIAXone (ROCEPHIN) 1,000 mg in sodium chloride 0.9 % 100 mL IVPB-VTB        Ordering Provider: Simone Coelho MD    1,000 mg  200 mL/hr over 30 Minutes Intravenous Every 24 Hours 06/10/25 1600 25 1733    06/10/25 1527  doxycycline (VIBRAMYCIN) 100 mg in sodium chloride 0.9 % 100 mL IVPB-VTB        Ordering Provider: Simone Coelho MD    100 mg Intravenous Every 12 Hours 06/10/25 1600 06/15/25 1559    25 1709  ceFAZolin 2000 mg IVPB in 100 mL NS  (VTB)        Ordering Provider: Ameya Carlin MD    2,000 mg  over 30 Minutes Intravenous Every 8 Hours 06/09/25 2300 06/10/25 0646    06/09/25 1219  sodium chloride 3,000 mL with polymyxin B 1,500,000 Units, vancomycin 3,000 mg irrigation        Ordering Provider: Ameya Carlin MD     Irrigation Once 06/09/25 1230 06/09/25 1332    06/09/25 1050  ceFAZolin 2000 mg IVPB in 100 mL NS (VTB)        Ordering Provider: Ameya Carlin MD    2,000 mg  over 30 Minutes Intravenous Once 06/09/25 1052 06/09/25 1524    06/07/25 2021  valACYclovir (VALTREX) tablet 500 mg        Ordering Provider: Ameya Carlin MD    500 mg Oral Daily 06/08/25 0900 06/08/26 0859          Current Diuretic Therapy:  Diuretics (From admission, onward)      None          ----------------------------------------------------------------------------------------------------------------------  Vital Signs:  Temp:  [97.4 °F (36.3 °C)-98.6 °F (37 °C)] 98.3 °F (36.8 °C)  Heart Rate:  [118] 118  Resp:  [17-20] 18  BP: (106-165)/(49-75) 123/58     on   ;   Device (Oxygen Therapy): room air  Body mass index is 30.16 kg/m².    Wt Readings from Last 3 Encounters:   06/10/25 87.4 kg (192 lb 9.6 oz)   12/05/24 89 kg (196 lb 3.2 oz)   05/23/24 88.8 kg (195 lb 12.8 oz)     Intake & Output (last 3 days)         06/14 0701  06/15 0700 06/15 0701 06/16 0700 06/16 0701 06/17 0700    P.O. 1440 1200 1440    I.V. (mL/kg)  0 (0)     Total Intake(mL/kg) 1440 (16.5) 1200 (13.7) 1440 (16.5)    Urine (mL/kg/hr) 800 (0.4)      Stool 0      Total Output 800      Net +640 +1200 +1440           Urine Unmeasured Occurrence 3 x 6 x 5 x    Stool Unmeasured Occurrence 1 x 1 x 1 x          Diet: Regular/House; Fluid Consistency: Thin (IDDSI 0)  ----------------------------------------------------------------------------------------------------------------------  Physical exam:   Constitutional:  Well-developed and well-nourished.  No acute distress.      HENT:  Head:   "Normocephalic and atraumatic.    Pulmonary/Chest:  Normal rate and effort  Musculoskeletal:  No deformity. Right upper extremity is in sling and swathe.    Neurological: Awake, alert, no focal deficit on gross examination. No slurred speech or facial droop.   Skin:  Skin is warm and dry.   Psychiatric: Appropriate mood and affect  ----------------------------------------------------------------------------------------------------------------------  Results from last 7 days   Lab Units 06/14/25  0142 06/12/25  0201 06/11/25  0833   WBC 10*3/mm3 14.28* 11.22* 14.23*   HEMOGLOBIN g/dL 9.7* 8.2* 9.3*   HEMATOCRIT % 30.5* 27.1* 31.1*   MCV fL 103.0* 106.7* 108.4*   MCHC g/dL 31.8 30.3* 29.9*   PLATELETS 10*3/mm3 207 193 194         Results from last 7 days   Lab Units 06/14/25  0142 06/12/25  0201 06/11/25  0833 06/10/25  0259   SODIUM mmol/L 140 143 142 137   POTASSIUM mmol/L 4.7 4.4 3.9 4.0   CHLORIDE mmol/L 109* 115* 112* 107   CO2 mmol/L 20.4* 19.6* 20.5* 18.3*   BUN mg/dL 20.0 21.1 28.9* 24.9*   CREATININE mg/dL 0.86 0.97 1.17* 1.27*   CALCIUM mg/dL 8.6 7.8* 8.1* 8.0*   GLUCOSE mg/dL 82 84 99 121*   ALBUMIN g/dL  --   --  2.9* 3.1*   BILIRUBIN mg/dL  --   --  0.4 0.4   ALK PHOS U/L  --   --  54 46   AST (SGOT) U/L  --   --  55* 42*   ALT (SGPT) U/L  --   --  14 18   Estimated Creatinine Clearance: 59.2 mL/min (by C-G formula based on SCr of 0.86 mg/dL).    Pain Management Panel           No data to display              Brief Urine Lab Results  (Last result in the past 365 days)        Color   Clarity   Blood   Leuk Est   Nitrite   Protein   CREAT   Urine HCG        06/10/25 1448 Yellow   Clear   Moderate (2+)   Moderate (2+)   Negative   Trace                 No results found for: \"BLOODCX\"  Urine Culture   Date Value Ref Range Status   06/10/2025 No growth  Final         I have personally looked at the labs and they are summarized " above.  ----------------------------------------------------------------------------------------------------------------------  Detailed radiology reports for the last 24 hours:  Imaging Results (Last 24 Hours)       ** No results found for the last 24 hours. **          Assessment & Plan      Assessment:  #Right humerus fracture and right shoulder dislocation secondary to mechanical ground level fall  #Right fifth metatarsal fracture  #Hyperlipidemia - continue rosuvastatin  #RA  #Osteoarthritis  #Lupus  #Hypertension   #GERD   #VERONIQUE  #Leukocytosis   #Human rhinovirus   #Mild hepatocellular transaminitis - Normalized  #MAURO, resolved  #Hospital delirium  #RLL pneumonia   s/p treatment with ceftriaxone and doxycycline   #Mild postoperative anemia    Plan:  - POD #6 total reverse shoulder arthroplasty. Nonoperative management planned for metatarsal fracture with walking boot, weight bearing as tolerated.   - Patient's pain medications had  and she was having significant pain today, so I restarted the hydrocodone 7.5-325mg q6h prn as well as dilaudid IV prn severe breakthrough pain, since she had been without pain medication since yesterday. Patient reported her pain was not significantly improved by the hydrocodone and she previously was taking 10-325mg at home, so I have now increased this to hydrocodone acetaminophen 10-325mg q6h prn.   - currently awaiting appeal for inpatient rehab, appreciate case management assistance  - continue PT/OT as tolerated  - plan for outpatient follow up with orthopedic surgery after discharge       Dispo: pending appeal for inpatient rehab, if denied then patient is interested in swing bed for short term rehab    Amanda Pineda DO  [facility]  25  21:14 EDT

## 2025-06-17 NOTE — CASE MANAGEMENT/SOCIAL WORK
Discharge Planning Assessment   Kaiden     Patient Name: Tiara Stubbs  MRN: [id number]  Today's Date: 6/17/2025    Admit Date: 6/7/2025       Discharge Plan       Row Name 06/17/25 1257       Plan    Final Discharge Disposition Code 06 - home with home health care    Final Note Pt is being discharged home with Provider ordered home health services. Pt and dtr are aware and agreeable to discharge. Pt prefers [facility]. SS faxed  referral to fax [phone]. SS provided RN report number for [facility] [phone]. Pt's dtr to provide transport.                 Continued Care and Services - Admitted Since 6/7/2025       Destination       Service Provider Request Status Services Address Phone Fax Patient Preferred    [facility] Declined  Appeal was pending but Patient improved [facility] [address] [place] [address] [phone] --           Home Medical Care       Service Provider Request Status Services Address Phone Fax Patient Preferred    [facility]  [facility] [address] [place] [address] [facility] [address] [phone] --                  Expected Discharge Date and Time       Expected Discharge Date Expected Discharge Time    Jun 17, 2025            Demographic Summary    No documentation.                  Functional Status    No documentation.                  Psychosocial    No documentation.                  Abuse/Neglect    No documentation.                  Legal    No documentation.                  Substance Abuse    No documentation.                  Patient Forms    No documentation.                     KARLY Robertson

## 2025-06-17 NOTE — DISCHARGE PLACEMENT REQUEST
"Tiara Stubbs (80 y.o. Female)       Date of Birth   1945    Social Security Number       Address   [address]  [place] [place] [address]    Home Phone   [phone]    MRN   [id number]       [facility]    Marital Status                               Admission Date   2025    Admission Type   Urgent    Admitting Provider   Simone Coelho MD    Attending Provider   Amanda Pineda DO    Department, Room/Bed   [address], 3337/1P       Discharge Date       Discharge Disposition   Home-Health Care [facility]    Discharge Destination                                 Attending Provider: Amanda Pineda DO    Allergies: Codeine, Stadol [Butorphanol], Vistaril [Hydroxyzine Hcl]    Isolation: None   Infection: None   Code Status: CPR    Ht: 170.2 cm (67.01\")   Wt: 87.4 kg (192 lb 9.6 oz)    Admission Cmt: None   Principal Problem: Shoulder fracture, right, closed, initial encounter [S42.91XA]                   Active Insurance as of 2025       Primary Coverage       Payor Plan Insurance Group Employer/Plan Group    HUMANA MEDICARE REPLACEMENT HUMANA MEDICARE ADVANTAGE PPO [id number]       Payor Plan Address Payor Plan Phone Number Payor Plan Fax Number Effective Dates    [address] [address] [phone]  2025 - None Entered    [facility] [address]         Subscriber Name Subscriber Birth Date Member ID       TIARA STUBBS 1945 [id number]                     Emergency Contacts        (Rel.) Home Phone Work Phone Mobile Phone    Keya Quinonezelle (Daughter) [phone] -- --          [address] [address]  Phone:  [phone]  Fax:  [phone] Date: 2025      Ambulatory Referral to Home Health     Patient:  Tiara Stubbs MRN:  [id number]   [address] [address] [address] :  1945  SSN:    Phone: [phone] Sex:  F      INSURANCE PAYOR PLAN GROUP # SUBSCRIBER ID   Primary:    HUMANA " MEDICARE REPLACEMENT [id number]      Referring Provider Information:  AMANDA PINEDA Phone: [phone] Fax: [phone]       Referral Information:   # Visits:  999 Referral Type: Home Health [42]   Urgency:  Routine Referral Reason: Specialty Services Required   Start Date: Jun 17, 2025 End Date:  To be determined by Insurer   Diagnosis: Shoulder fracture, right, closed, initial encounter (S42.91XA)  Closed fracture of right foot, initial encounter (S92.901A)      Refer to Dept:   Refer to Provider:   Refer to Provider Phone:   Refer to Facility:       Face to Face Visit Date: 6/17/2025  Follow-up provider for Plan of Care? I treated the patient in an acute care facility and will not continue treatment after discharge.  Follow-up provider: [place] GIRARD [[id number]]  Reason/Clinical Findings: right shoulder replacement, right 5th metatarsal fracture  Describe mobility limitations that make leaving home difficult: right arm/shoulder and right lower extremity in braces  Nursing/Therapeutic Services Requested: Physical Therapy  Nursing/Therapeutic Services Requested: Occupational Therapy  PT orders: Therapeutic exercise  PT orders: Home safety assessment  PT orders: Strengthening  PT orders: Transfer training  Occupational orders: Activities of daily living  Frequency: 1 Week 1     This document serves as a request of services and does not constitute Insurance authorization or approval of services.  To determine eligibility, please contact the members Insurance carrier to verify and review coverage.     If you have medical questions regarding this request for services. Please contact [address] at [phone] during normal business hours.        Authorizing Provider:Amanda Pineda DO  Authorizing Provider's NPI: [id number]  Order Entered By: Amanda Pineda DO 6/17/2025 12:01 PM     Electronically signed by: Amanda Pineda DO 6/17/2025 12:01 PM          History & Physical         Tico Mckoy PA-C at 25 1022       Attestation signed by Amanda Pineda DO at 25 1633      I have evaluated the patient independently, I have reviewed H&P, all labs and image reports from today and evaluated the patient at bedside.  I have discussed with Tico Mckoy PA-C and agree.  Patient's case and images were discussed with Dr. Carlin by the outside facility. Initial recommendation was for patient to transfer to [facility] so that the surgery could be done this weekend, but patient refused multiple times per my discussion with the ER provider, and requested transfer to our facility. Due to the needed equipment having to be brought to our facility it will likely be Monday or Tuesday before she is able to have surgery done.                          [facility] Medicine Services  History & Physical    Patient Identification:  Name:  Tiraa Stubbs  Age:  80 y.o.  Sex:  female  :  1945  MRN:  [id number]   Visit Number:  [id number]  Admit Date: 2025   Primary Care Physician:  Paula Downey APRN    Subjective     Chief complaint: Transfer from OSH due to fall, fractures    History of presenting illness:      Tiara Stubbs is a 80 y.o. female who presented for further evaluation and management of multiple fractures s/p fall.  Per report from transferring facility patient fell from standing height and on OSH ED workup was found to have a right humeral fracture as well as right shoulder dislocation and an ankle fracture.  She was seen and examined on 3S with family at the bedside. She is generally comfortable appearing but does have intermittent pain with noted grimacing. She states had a mechanical fall when standing from the toilet though did not have any preceding dizziness. She landed on her right side. She did hit her head, just lateral to the right eye and has bruising. She and family state CT head was completed at OSH and  negative. She states normally doesn't have any trouble ambulating at home, doesn't require walker or cane. She does live with her daughter and EMILY.   She reports she has otherwise been feeling okay prior to the fall. Has had recent issues with pneumonia/bronchitis but reported has completed treatment with no SOA or cough reported today. She denies any chest pain, no history of heart disease. She does take daily ASA at the direction of her rheumatologist. She denies any abdominal pain, nausea, vomiting, diarrhea. She is not having difficulty urinating.   She has history of RA- historically on methotrexate but only taking prednisone currently.     Past medical history is significant for RA, osteoarthritis, hypertension, lupus, hyperlipidemia, GERD      Known Emergency Department medications received prior to my evaluation included morphine.   Room location at the time of my evaluation was 304b.     ---------------------------------------------------------------------------------------------------------------------   Review of Systems   Constitutional:  Negative for chills and fever.   HENT:  Negative for congestion and rhinorrhea.    Respiratory:  Negative for cough and shortness of breath.    Cardiovascular:  Negative for chest pain and leg swelling.   Gastrointestinal:  Negative for abdominal pain, diarrhea, nausea and vomiting.   Genitourinary:  Negative for difficulty urinating and dysuria.   Musculoskeletal:  Positive for arthralgias and myalgias.   Skin:  Negative for rash and wound.   Neurological:  Negative for dizziness and light-headedness.        ---------------------------------------------------------------------------------------------------------------------   Past Medical History:   Diagnosis Date    Acid reflux     Fibromyalgia     High cholesterol     Hx: recurrent pneumonia     Hypertension     Lupus     Osteoarthritis     Rheumatoid arthritis      Past Surgical History:   Procedure Laterality Date     CARPAL TUNNEL RELEASE Bilateral     KNEE SURGERY Right     TOTAL WRIST ARTHROPLASTY Right      Family History   Problem Relation Age of Onset    Heart disease Mother     Liver cancer Mother     Heart disease Father     Breast cancer Neg Hx      Social History     Socioeconomic History    Marital status:    Tobacco Use    Smoking status: Never     Passive exposure: Never    Smokeless tobacco: Never   Vaping Use    Vaping status: Never Used   Substance and Sexual Activity    Alcohol use: Never    Drug use: Never    Sexual activity: Defer     ---------------------------------------------------------------------------------------------------------------------   Allergies:  Codeine, Stadol [butorphanol], and Vistaril [hydroxyzine hcl]  ---------------------------------------------------------------------------------------------------------------------   Home medications:    Medications below are reported home medications pulling from within the system; at this time, these medications have not been reconciled unless otherwise specified and are in the verification process for further verifcation as current home medications.  Medications Prior to Admission   Medication Sig Dispense Refill Last Dose/Taking    albuterol (ACCUNEB) 1.25 MG/3ML nebulizer solution 3 mL.       aspirin 81 MG EC tablet Take 1 tablet by mouth Daily.       azelastine (ASTELIN) 0.1 % nasal spray 1 spray 2 (Two) Times a Day.       benzonatate (TESSALON) 100 MG capsule 1 capsule.       Budeson-Glycopyrrol-Formoterol (BREZTRI) 160-9-4.8 MCG/ACT aerosol inhaler 2 (Two) Times a Day.       Cholecalciferol 50 MCG (2000 UT) tablet Daily.       cyclobenzaprine (FLEXERIL) 10 MG tablet        Diclofenac Sodium (VOLTAREN) 1 % gel gel APPLY 4 GRAMS TOPICALLY FOUR TIMES DAILY AS NEEDED FOR PAIN. APPLY TO A SINGLE KNEE ANKLE FOOT (FOR FOOT INCLUDES SOLE TOES TOP OF FOOT       famotidine (PEPCID) 40 MG tablet        fluticasone (FLONASE) 50 MCG/ACT nasal  spray        folic acid (FOLVITE) 1 MG tablet Take one tablet every day except mondays       gabapentin (NEURONTIN) 400 MG capsule        HYDROcodone-acetaminophen (NORCO)  MG per tablet TAKE 1 TABLET BY MOUTH FOUR TIMES DAILY AS NEEDED FOR PAIN (DO NOT FILL UNTIL ON OR AFTER 09/26/21)       ibandronate (BONIVA) 150 MG tablet TAKE 1 TABLET BY MOUTH ONCE every MONTH       lactulose (CHRONULAC) 10 GM/15ML solution        methotrexate 2.5 MG tablet Take 8 tablets by mouth.       metoprolol succinate XL (TOPROL-XL) 25 MG 24 hr tablet        olmesartan (BENICAR) 40 MG tablet TAKE 1 TABLET BY MOUTH ONCE DAILY AS DIRECTED Dose Increase       predniSONE (DELTASONE) 1 MG tablet 4 tablets.       rosuvastatin (CRESTOR) 20 MG tablet 1 tablet.       valACYclovir (VALTREX) 500 MG tablet 1 tablet.          Hospital Scheduled Meds:  senna-docusate sodium, 2 tablet, Oral, BID  sodium chloride, 10 mL, Intravenous, Q12H           Current listed hospital scheduled medications may not yet reflect those currently placed in orders that are signed and held awaiting patient's arrival to floor.   ---------------------------------------------------------------------------------------------------------------------     Objective     Vital Signs:     There were no vitals filed for this visit.  There is no height or weight on file to calculate BMI.  ---------------------------------------------------------------------------------------------------------------------       Physical Exam  Vitals and nursing note reviewed.   Constitutional:       General: She is not in acute distress.  HENT:      Head: Normocephalic.   Eyes:      Extraocular Movements: Extraocular movements intact.      Comments: There is bruising just lateral of her right eye   Cardiovascular:      Rate and Rhythm: Normal rate and regular rhythm.   Pulmonary:      Effort: Pulmonary effort is normal. No respiratory distress.   Abdominal:      Palpations: Abdomen is soft.  "  Musculoskeletal:      Left lower leg: No edema.      Comments: Splint is in place to the right lower extremity  Sling in place to right upper extremity   Skin:     General: Skin is warm and dry.   Neurological:      Mental Status: She is alert. Mental status is at baseline.   Psychiatric:         Mood and Affect: Mood normal.             ---------------------------------------------------------------------------------------------------------------------  EKG:      ---------------------------------------------------------------------------------------------------------------------                 Invalid input(s): \"PROT\"CrCl cannot be calculated (No successful lab value found.).  No results found for: \"AMMONIA\"          No results found for: \"HGBA1C\"  No results found for: \"TSH\", \"FREET4\"  No results found for: \"PREGTESTUR\", \"PREGSERUM\", \"HCG\", \"HCGQUANT\"  Pain Management Panel           No data to display              No results found for: \"BLOODCX\"  No results found for: \"URINECX\"  No results found for: \"WOUNDCX\"  No results found for: \"STOOLCX\"      ---------------------------------------------------------------------------------------------------------------------  Imaging Results (Last 7 Days)       ** No results found for the last 168 hours. **            Cultures:  No results found for: \"BLOODCX\", \"URINECX\", \"WOUNDCX\", \"MRSACX\", \"RESPCX\", \"STOOLCX\"    Last echocardiogram:  Results for orders placed during the hospital encounter of 11/17/21    Adult Transthoracic Echo Complete W/ Cont if Necessary Per Protocol    Interpretation Summary  · Estimated left ventricular EF = 65% Left ventricular ejection fraction appears to be 61 - 65%. Left ventricular systolic function is normal.  · Left ventricular diastolic function was normal.  · Estimated right ventricular systolic pressure from tricuspid regurgitation is mildly elevated (35-45 mmHg).  · Moderate pulmonary hypertension is present.  · No pericardial " effusion  · No prev echo          I have personally reviewed the above radiology images and read the final radiology report on 06/07/25  ---------------------------------------------------------------------------------------------------------------------  Assessment / Plan     Active Hospital Problems    Diagnosis  POA    **Shoulder fracture, right, closed, initial encounter [S42.91XA]  Yes       ASSESSMENT/PLAN:    Mechanical fall, PTA  Right humerus fracture  Right shoulder dislocation  Right fifth metatarsal fracture  Patient presented from OSH for further management of multiple fractures s/p mechanical fall.  Per report patient has fractured right humerus with dislocated right shoulder and fractured her right fifth metatarsal.  Orthopedic surgery consulted for further assistance and recommendations, appreciated.  Per report plan will be for right shoulder replacement-will be delayed until Monday or Tuesday when surgical equipment can be relocated to this hospital as patient has refused transfer to [facility]  Admit to the telemetry unit  Continue pain regimen and adjust as needed  Will obtain preprocedure EKG  Basic laboratory workup is pending  She will benefit from PT OT postoperatively  May also require CM/SW assistance with discharge planning.    Chronic:  Hyperlipidemia  RA/osteoarthritis  Lupus  Hypertension  GERD  Continue home medication regimen as indicated once med rec is complete  Will continue to monitor vital signs closely    ----------  -DVT prophylaxis: SCDs  -Activity: Bedrest  -Expected length of stay: INPATIENT status due to the need for care which can only be reasonably provided in an hospital setting such as aggressive/expedited ancillary services and/or consultation services, the necessity for IV medications, close physician monitoring and/or the possible need for procedures.  In such, I feel patient’s risk for adverse outcomes and need for care warrant INPATIENT evaluation and  predict the patient’s care encounter to likely last beyond 2 midnights.   -Disposition Pending further clinical course and progress with PT OT post op    High risk secondary to Fall with multiple fractures    There are no questions and answers to display.       Tico Mckoy PA-C   25  10:22 EDT     Electronically signed by Amanda Pineda DO at 25 1633          Physician Progress Notes (most recent note)        Amanda Pineda DO at 25 2114              [facility]     Patient Identification:  Name:  Tiara Stubbs  Age:  80 y.o.  Sex:  female  :  1945  MRN:  [id number]  Visit Number:  [id number]  ROOM: [address] [place] [address]     Primary Care Provider:  Paula Downey APRN    Length of stay in inpatient status:  9    Subjective     History of Presenting Illness:    Patient reported significant pain in her shoulder but also chronic pain in her back at time of my exam today, which had not been improved by the dose of hydrocodone acetaminophen 7.5-325 she had received prior to my visit. She denied any shortness of breath or other new complaints.     Objective     Current Hospital Meds:cholecalciferol, 2,000 Units, Oral, Daily  cyclobenzaprine, 10 mg, Oral, Nightly  famotidine, 40 mg, Oral, Daily  folic acid, 1,000 mcg, Oral, Daily  gabapentin, 400 mg, Oral, TID  heparin (porcine), 5,000 Units, Subcutaneous, Q8H  losartan, 100 mg, Oral, Q24H  metoprolol succinate XL, 25 mg, Oral, Daily  predniSONE, 4 mg, Oral, Daily  rosuvastatin, 20 mg, Oral, Daily  senna-docusate sodium, 2 tablet, Oral, BID  sodium chloride, 10 mL, Intravenous, Q12H  valACYclovir, 500 mg, Oral, Daily         Current Antimicrobial Therapy:  Anti-Infectives (From admission, onward)      Ordered     Dose/Rate Route Frequency Start Stop    06/15/25 0551  doxycycline (MONODOX) capsule 100 mg        Ordering Provider: Jennifer Grey APRN    100 mg Oral Once 06/15/25 0645 06/15/25 0558    06/10/25  1527  cefTRIAXone (ROCEPHIN) 1,000 mg in sodium chloride 0.9 % 100 mL IVPB-VTB        Ordering Provider: Simone Coelho MD    1,000 mg  200 mL/hr over 30 Minutes Intravenous Every 24 Hours 06/10/25 1600 06/14/25 1733    06/10/25 1527  doxycycline (VIBRAMYCIN) 100 mg in sodium chloride 0.9 % 100 mL IVPB-VTB        Ordering Provider: Simone Coelho MD    100 mg Intravenous Every 12 Hours 06/10/25 1600 06/15/25 1559    06/09/25 1709  ceFAZolin 2000 mg IVPB in 100 mL NS (VTB)        Ordering Provider: Ameya Carlin MD    2,000 mg  over 30 Minutes Intravenous Every 8 Hours 06/09/25 2300 06/10/25 0646    06/09/25 1219  sodium chloride 3,000 mL with polymyxin B 1,500,000 Units, vancomycin 3,000 mg irrigation        Ordering Provider: Ameya Carlin MD     Irrigation Once 06/09/25 1230 06/09/25 1332    06/09/25 1050  ceFAZolin 2000 mg IVPB in 100 mL NS (VTB)        Ordering Provider: Ameya Carlin MD    2,000 mg  over 30 Minutes Intravenous Once 06/09/25 1052 06/09/25 1524    06/07/25 2021  valACYclovir (VALTREX) tablet 500 mg        Ordering Provider: Ameya Carlin MD    500 mg Oral Daily 06/08/25 0900 06/08/26 0859          Current Diuretic Therapy:  Diuretics (From admission, onward)      None          ----------------------------------------------------------------------------------------------------------------------  Vital Signs:  Temp:  [97.4 °F (36.3 °C)-98.6 °F (37 °C)] 98.3 °F (36.8 °C)  Heart Rate:  [118] 118  Resp:  [17-20] 18  BP: (106-165)/(49-75) 123/58     on   ;   Device (Oxygen Therapy): room air  Body mass index is 30.16 kg/m².    Wt Readings from Last 3 Encounters:   06/10/25 87.4 kg (192 lb 9.6 oz)   12/05/24 89 kg (196 lb 3.2 oz)   05/23/24 88.8 kg (195 lb 12.8 oz)     Intake & Output (last 3 days)         06/14 0701  06/15 0700 06/15 0701  06/16 0700 06/16 0701  06/17 0700    P.O. 1440 1200 1440    I.V. (mL/kg)  0 (0)     Total Intake(mL/kg) 1440 (16.5) 1200 (13.7) 1440 (16.5)     Urine (mL/kg/hr) 800 (0.4)      Stool 0      Total Output 800      Net +640 +1200 +1440           Urine Unmeasured Occurrence 3 x 6 x 5 x    Stool Unmeasured Occurrence 1 x 1 x 1 x          Diet: Regular/House; Fluid Consistency: Thin (IDDSI 0)  ----------------------------------------------------------------------------------------------------------------------  Physical exam:   Constitutional:  Well-developed and well-nourished.  No acute distress.      HENT:  Head:  Normocephalic and atraumatic.    Pulmonary/Chest:  Normal rate and effort  Musculoskeletal:  No deformity. Right upper extremity is in sling and swathe.    Neurological: Awake, alert, no focal deficit on gross examination. No slurred speech or facial droop.   Skin:  Skin is warm and dry.   Psychiatric: Appropriate mood and affect  ----------------------------------------------------------------------------------------------------------------------  Results from last 7 days   Lab Units 06/14/25  0142 06/12/25  0201 06/11/25  0833   WBC 10*3/mm3 14.28* 11.22* 14.23*   HEMOGLOBIN g/dL 9.7* 8.2* 9.3*   HEMATOCRIT % 30.5* 27.1* 31.1*   MCV fL 103.0* 106.7* 108.4*   MCHC g/dL 31.8 30.3* 29.9*   PLATELETS 10*3/mm3 207 193 194         Results from last 7 days   Lab Units 06/14/25  0142 06/12/25  0201 06/11/25  0833 06/10/25  0259   SODIUM mmol/L 140 143 142 137   POTASSIUM mmol/L 4.7 4.4 3.9 4.0   CHLORIDE mmol/L 109* 115* 112* 107   CO2 mmol/L 20.4* 19.6* 20.5* 18.3*   BUN mg/dL 20.0 21.1 28.9* 24.9*   CREATININE mg/dL 0.86 0.97 1.17* 1.27*   CALCIUM mg/dL 8.6 7.8* 8.1* 8.0*   GLUCOSE mg/dL 82 84 99 121*   ALBUMIN g/dL  --   --  2.9* 3.1*   BILIRUBIN mg/dL  --   --  0.4 0.4   ALK PHOS U/L  --   --  54 46   AST (SGOT) U/L  --   --  55* 42*   ALT (SGPT) U/L  --   --  14 18   Estimated Creatinine Clearance: 59.2 mL/min (by C-G formula based on SCr of 0.86 mg/dL).    Pain Management Panel           No data to display              Brief Urine Lab Results  (Last  "result in the past 365 days)        Color   Clarity   Blood   Leuk Est   Nitrite   Protein   CREAT   Urine HCG        06/10/25 1448 Yellow   Clear   Moderate (2+)   Moderate (2+)   Negative   Trace                 No results found for: \"BLOODCX\"  Urine Culture   Date Value Ref Range Status   06/10/2025 No growth  Final         I have personally looked at the labs and they are summarized above.  ----------------------------------------------------------------------------------------------------------------------  Detailed radiology reports for the last 24 hours:  Imaging Results (Last 24 Hours)       ** No results found for the last 24 hours. **          Assessment & Plan      Assessment:  #Right humerus fracture and right shoulder dislocation secondary to mechanical ground level fall  #Right fifth metatarsal fracture  #Hyperlipidemia - continue rosuvastatin  #RA  #Osteoarthritis  #Lupus  #Hypertension   #GERD   #VERONIQUE  #Leukocytosis   #Human rhinovirus   #Mild hepatocellular transaminitis - Normalized  #MAURO, resolved  #Hospital delirium  #RLL pneumonia   s/p treatment with ceftriaxone and doxycycline   #Mild postoperative anemia    Plan:  - POD #6 total reverse shoulder arthroplasty. Nonoperative management planned for metatarsal fracture with walking boot, weight bearing as tolerated.   - Patient's pain medications had  and she was having significant pain today, so I restarted the hydrocodone 7.5-325mg q6h prn as well as dilaudid IV prn severe breakthrough pain, since she had been without pain medication since yesterday. Patient reported her pain was not significantly improved by the hydrocodone and she previously was taking 10-325mg at home, so I have now increased this to hydrocodone acetaminophen 10-325mg q6h prn.   - currently awaiting appeal for inpatient rehab, appreciate case management assistance  - continue PT/OT as tolerated  - plan for outpatient follow up with orthopedic surgery after discharge "       Dispo: pending appeal for inpatient rehab, if denied then patient is interested in swing bed for short term rehab    Amanda Pineda DO  [facility] Hospitalist  25  21:14 EDT     Electronically signed by Amanda Pineda DO at 25 2121          Physical Therapy Notes (most recent note)        Talat Trujillo, PT at 25 1257  Version 1 of 1         Acute Care - Physical Therapy Treatment Note   [facility]     Patient Name: Tiara Stubbs  : 1945  MRN: [id number]  Today's Date: 2025      Visit Dx:     ICD-10-CM ICD-9-CM   1. Shoulder fracture, right, closed, initial encounter  S42.91XA 812.00   2. Closed fracture of right foot, initial encounter  S92.901A 825.20   3. Acute right ankle pain  M25.571 719.47     338.19   4. Post-operative state  Z98.890 V45.89     Patient Active Problem List   Diagnosis    Overweight (BMI 25.0-29.9)    Immunization due    Chronic cough    SOB (shortness of breath)    Shoulder fracture, right, closed, initial encounter    Closed fracture of bone of right foot    Acute right ankle pain     Past Medical History:   Diagnosis Date    Acid reflux     Cancer     skin    Fibromyalgia     High cholesterol     Hx: recurrent pneumonia     Hypertension     Lupus     Osteoarthritis     Rheumatoid arthritis     Shoulder injury      Past Surgical History:   Procedure Laterality Date    CARPAL TUNNEL RELEASE Bilateral     KNEE SURGERY Right     TOTAL WRIST ARTHROPLASTY Right      PT Assessment (Last 12 Hours)       PT Evaluation and Treatment       Row Name 25 1251          Physical Therapy Time and Intention    Subjective Information no complaints  -KM     Document Type therapy note (daily note)  -KM     Mode of Treatment individual therapy;physical therapy  -KM     Patient Effort good  -KM     Symptoms Noted During/After Treatment increased pain  -KM       Row Name 25 1259          General Information    Patient Profile Reviewed yes  -KM     Patient  Observations alert;cooperative;agree to therapy  -KM     Existing Precautions/Restrictions fall;shoulder;non-weight bearing;brace on at all times  -KM     Limitations/Impairments safety/cognitive  -KM       Row Name 06/16/25 1255          Pain    Pretreatment Pain Rating 10/10  -KM     Posttreatment Pain Rating 10/10  -KM     Pain Location shoulder  -KM     Pain Side/Orientation right  -KM       Row Name 06/16/25 1255          Cognition    Affect/Mental Status (Cognition) other (see comments)  pleasantly confused  -KM     Orientation Status (Cognition) oriented to;person;verbal cues/prompts needed for orientation  -KM     Follows Commands (Cognition) follows one-step commands  -KM       Row Name 06/16/25 1255          Transfers    Transfers sit-stand transfer;stand-sit transfer  -KM       Row Name 06/16/25 1255          Sit-Stand Transfer    Sit-Stand [place] (Transfers) standby assist  -KM       Row Name 06/16/25 1255          Stand-Sit Transfer    Stand-Sit [place] (Transfers) standby assist  -KM       Row Name 06/16/25 1255          Gait/Stairs (Locomotion)    Gait/Stairs Locomotion gait/ambulation independence;gait/ambulation assistive device;distance ambulated  -KM     [place] Level (Gait) contact guard  -KM     Assistive Device (Gait) --  HHA  -KM     Patient was able to Ambulate yes  -KM     Distance in Feet (Gait) 120  -KM     Pattern (Gait) step-to  -KM     Deviations/Abnormal Patterns (Gait) gait speed decreased  -KM       Row Name 06/16/25 1255          Safety Issues/Impairments Affecting Functional Mobility    Impairments Affecting Function (Mobility) balance;cognition;endurance/activity tolerance;pain;range of motion (ROM);strength  -KM       Row Name             Wound 06/09/25 1511 Right shoulder Surgical Open Surgical Incision    Wound - Properties Group Placement Date: 06/09/25  -CE Placement Time: 1511 -CE Side: Right  -CE Location: shoulder  -CE Primary Wound Type: Surgical  -CE  Secondary Wound Type - Surgical: Open Surg  -CE    Retired Wound - Properties Group Placement Date: 06/09/25 -CE Placement Time: 1511 -CE Side: Right  -CE Location: shoulder  -CE    Retired Wound - Properties Group Placement Date: 06/09/25  -CE Placement Time: 1511  -CE Side: Right  -CE Location: shoulder  -CE    Retired Wound - Properties Group Date first assessed: 06/09/25  -CE Time first assessed: 1511 -CE Side: Right  -CE Location: shoulder  -CE      Row Name 06/16/25 1255          Progress Summary (PT)    Daily Progress Summary (PT) Pt. was able to demonstrate functional mobility skills w/ SBA. She was able to ambulate improved distance and quality w/ HHA. Pt. continues to  demonstrate improved mobility skills.  -               User Key  (r) = Recorded By, (t) = Taken By, (c) = Cosigned By      Initials Name Provider Type    Julián Cunningham, RN Registered Nurse    Talat Vaughn, SHERRI Physical Therapist                    Physical Therapy Education       Title: PT OT SLP Therapies (Done)       Topic: Physical Therapy (Done)       Point: Mobility training (Done)       Learning Progress Summary            Patient Acceptance, E,TB, VU by  at 6/10/2025 1455                      Point: Home exercise program (Done)       Learning Progress Summary            Patient Acceptance, E,TB, VU by  at 6/10/2025 1455                      Point: Body mechanics (Done)       Learning Progress Summary            Patient Acceptance, E,TB, VU by  at 6/10/2025 1455                      Point: Precautions (Done)       Learning Progress Summary            Patient Acceptance, E,TB, VU by  at 6/10/2025 1455                                      User Key       Initials Effective Dates Name Provider Type Discipline    ROSALBA 05/24/22 -  Talat Trujillo, SHERRI Physical Therapist PT                  PT Recommendation and Plan  Anticipated Discharge Disposition (PT): inpatient rehabilitation facility  Planned Therapy Interventions  (PT): balance training, bed mobility training, gait training, home exercise program, patient/family education, postural re-education, ROM (range of motion), strengthening, stretching, transfer training  Therapy Frequency (PT): 5 times/wk (5x/wk)  Progress Summary (PT)  Daily Progress Summary (PT): Pt. was able to demonstrate functional mobility skills w/ SBA. She was able to ambulate improved distance and quality w/ HHA. Pt. continues to  demonstrate improved mobility skills.  Plan of Care Reviewed With: patient  Outcome Evaluation: Pt. evaluation completed during PT session. She was able to perform functional mobility skills w/ [place]-modA x2. She was able to transfer but unable to ambulate at time of evaluation. Pt. would benefit from increased PT services at this time.       Time Calculation:    PT Charges       Row Name 25 1253             Time Calculation    PT Received On 25  -KM         Time Calculation- PT    Total Timed Code Minutes- PT 23 minute(s)  -KM                User Key  (r) = Recorded By, (t) = Taken By, (c) = Cosigned By      Initials Name Provider Type    Talat Vaughn, PT Physical Therapist                  Therapy Charges for Today       Code Description Service Date Service Provider Modifiers Qty    19789570191 HC PT THERAPEUTIC ACT EA 15 MIN 2025 Talat Trujillo, PT GP 1    18255772053 HC GAIT TRAINING EA 15 MIN 2025 Talat Trujillo, PT GP 1            PT G-Codes  AM-PAC 6 Clicks Score (PT): 20    Talat Trujillo PT  2025      Electronically signed by Talat Trujillo PT at 25 1257          Occupational Therapy Notes (most recent note)        Tata Ruiz, OT at 25 1242          Acute Care - Occupational Therapy Treatment Note  [facility]     Patient Name: Tiara Stubbs  : 1945  MRN: [id number]  Today's Date: 2025             Admit Date: 2025       ICD-10-CM ICD-9-CM   1. Shoulder fracture, right, closed, initial encounter  S42.91XA 812.00    2. Closed fracture of right foot, initial encounter  S92.901A 825.20   3. Acute right ankle pain  M25.571 719.47     338.19   4. Post-operative state  Z98.890 V45.89     Patient Active Problem List   Diagnosis    Overweight (BMI 25.0-29.9)    Immunization due    Chronic cough    SOB (shortness of breath)    Shoulder fracture, right, closed, initial encounter    Closed fracture of bone of right foot    Acute right ankle pain     Past Medical History:   Diagnosis Date    Acid reflux     Cancer     skin    Fibromyalgia     High cholesterol     Hx: recurrent pneumonia     Hypertension     Lupus     Osteoarthritis     Rheumatoid arthritis     Shoulder injury      Past Surgical History:   Procedure Laterality Date    CARPAL TUNNEL RELEASE Bilateral     KNEE SURGERY Right     TOTAL WRIST ARTHROPLASTY Right          OT ASSESSMENT FLOWSHEET (Last 12 Hours)       OT Evaluation and Treatment       Row Name 06/13/25 1239                   OT Time and Intention    Subjective Information complains of;weakness;fatigue;pain  -LM        Document Type therapy note (daily note)  -LM        Mode of Treatment occupational therapy  -LM        Patient Effort good  -LM        Comment Patient seen this date for adl retraining, fxl mobility, sling donning/doffing, staff ed.  patient required min assist with standing balance.  Max assist for sling adjustment,  RN present for sling education.  -LM           General Information    Existing Precautions/Restrictions fall;shoulder;non-weight bearing;brace on at all times  -LM        Limitations/Impairments safety/cognitive  -LM           Pain Assessment    Pretreatment Pain Rating 1/10  -LM        Posttreatment Pain Rating 1/10  -LM           Cognition    Affect/Mental Status (Cognition) confused  pleasantly  -LM           Upper Body Dressing Assessment/Training    [place] Level (Upper Body Dressing) dependent (less than 25% patient effort);maximum assist (25% patient effort)  -LM            Lower Body Dressing Assessment/Training    [place] Level (Lower Body Dressing) dependent (less than 25% patient effort);maximum assist (25% patient effort)  -LM           Grooming Assessment/Training    [place] Level (Grooming) maximum assist (25% patient effort)  -LM           Self-Feeding Assessment/Training    [place] Level (Feeding) set up  -LM           Toileting Assessment/Training    [place] Level (Toileting) dependent (less than 25% patient effort)  -LM           Wound 06/09/25 1511 Right shoulder Surgical Open Surgical Incision    Wound - Properties Group Placement Date: 06/09/25  -CE Placement Time: 1511  -CE Side: Right  -CE Location: shoulder  -CE Primary Wound Type: Surgical  -CE Secondary Wound Type - Surgical: Open Surg  -CE    Retired Wound - Properties Group Placement Date: 06/09/25  -CE Placement Time: 1511  -CE Side: Right  -CE Location: shoulder  -CE    Retired Wound - Properties Group Placement Date: 06/09/25  -CE Placement Time: 1511  -CE Side: Right  -CE Location: shoulder  -CE    Retired Wound - Properties Group Date first assessed: 06/09/25  -CE Time first assessed: 1511  -CE Side: Right  -CE Location: shoulder  -CE       Positioning and Restraints    Post Treatment Position chair  -LM        In Chair call light within reach;encouraged to call for assist;exit alarm on;notified nsg  -LM                  User Key  (r) = Recorded By, (t) = Taken By, (c) = Cosigned By      Initials Name Effective Dates    CE Julián Seaman RN 06/16/21 -     LM Tata Ruiz OT 06/16/21 -                            OT Recommendation and Plan              Time Calculation:     Therapy Charges for Today       Code Description Service Date Service Provider Modifiers Qty    37133882611  OT SELF CARE/MGMT/TRAIN EA 15 MIN 6/12/2025 Tata Ruiz OT GO 2    06419936084 HC OT SELF CARE/MGMT/TRAIN EA 15 MIN 6/13/2025 Tata Ruiz OT GO 2                 Tata Ruiz  OT  6/13/2025    Electronically signed by Tata Ruiz OT at 06/13/25 1242       Discharge Summary    No notes of this type exist for this encounter.       Discharge Order (From admission, onward)       Start     Ordered    06/17/25 1153  Discharge patient  Once        Expected Discharge Date: 06/17/25   Discharge Disposition: Home-Health Care [facility]   Physician of Record for Attribution - Please select from Treatment Team: COLE ROSALES [[id number]]   Review needed by CMO to determine Physician of Record: Yes      Question Answer Comment   Physician of Record for Attribution - Please select from Treatment Team COLE ROSALES    Review needed by CMO to determine Physician of Record Yes        06/17/25 [id number]

## 2025-06-17 NOTE — OUTREACH NOTE
Prep Survey      Flowsheet Row Responses   [facility] facility patient discharged from? [facility]   Is LACE score < 7 ? No   Eligibility Readm Mgmt   Discharge diagnosis Total shoulder reverse arthroplasty   Does the patient have one of the following disease processes/diagnoses(primary or secondary)? Total Joint Replacement   Does the patient have Home health ordered? Yes   What [facility] the [facility]   Is there a DME ordered? No   Prep survey completed? Yes            TAI BURNETT - Registered Nurse

## 2025-06-17 NOTE — THERAPY DISCHARGE NOTE
Acute Care - Physical Therapy Treatment Note/Discharge   [place]     Patient Name: Tiara Stubbs  : 1945  MRN: [id number]  Today's Date: 2025                Admit Date: 2025    Visit Dx:    ICD-10-CM ICD-9-CM   1. Shoulder fracture, right, closed, initial encounter  S42.91XA 812.00   2. Closed fracture of right foot, initial encounter  S92.901A 825.20   3. Acute right ankle pain  M25.571 719.47     338.19   4. Post-operative state  Z98.890 V45.89     Patient Active Problem List   Diagnosis    Overweight (BMI 25.0-29.9)    Immunization due    Chronic cough    SOB (shortness of breath)    Shoulder fracture, right, closed, initial encounter    Closed fracture of bone of right foot    Acute right ankle pain     Past Medical History:   Diagnosis Date    Acid reflux     Cancer     skin    Fibromyalgia     High cholesterol     Hx: recurrent pneumonia     Hypertension     Lupus     Osteoarthritis     Rheumatoid arthritis     Shoulder injury      Past Surgical History:   Procedure Laterality Date    CARPAL TUNNEL RELEASE Bilateral     KNEE SURGERY Right     TOTAL WRIST ARTHROPLASTY Right        PT Assessment (Last 12 Hours)       PT Evaluation and Treatment       Row Name 25 0945          Physical Therapy Time and Intention    Document Type discharge treatment  -CS     Mode of Treatment individual therapy;physical therapy  -CS     Patient Effort good  -CS     Comment Pt seen bedside this AM. Pt agreed to PT.  -CS       Row Name 25 0945          General Information    Patient Profile Reviewed yes  -CS     Existing Precautions/Restrictions fall;shoulder;non-weight bearing;brace on at all times  -CS     Limitations/Impairments safety/cognitive  -CS       Row Name 25 0945          Living Environment    Primary Care Provided by self  -CS       Row Name 25 0945          Home Use of Assistive/Adaptive Equipment    Equipment Currently Used at Home walker, rolling;cane, straight  -CS        Row Name 06/17/25 0945          Pain    Pretreatment Pain Rating 10/10  -CS     Posttreatment Pain Rating 10/10  -CS     Pain Side/Orientation right  -CS       Row Name 06/17/25 0945          Pain Scale: FACES Pre/Post-Treatment    Pain: FACES Scale, Pretreatment 2-->hurts little bit  -CS     Posttreatment Pain Rating 2-->hurts little bit  -CS       Row Name 06/17/25 0945          Cognition    Affect/Mental Status (Cognition) other (see comments)  pleasantly confused  -CS     Orientation Status (Cognition) oriented to;person;verbal cues/prompts needed for orientation  -CS     Follows Commands (Cognition) follows one-step commands  -CS       Row Name 06/17/25 0945          Vision Assessment/Intervention    Visual Impairment/Limitations WFL  -CS       Row Name 06/17/25 0945          Bed Mobility    Bed Mobility supine-sit  -CS     Supine-Sit [place] (Bed Mobility) moderate assist (50% patient effort)  -CS     Assistive Device (Bed Mobility) bed rails;head of bed elevated  -CS       Row Name 06/17/25 0945          Transfers    Transfers sit-stand transfer;stand-sit transfer  -CS       Row Name 06/17/25 0945          Bed-Chair Transfer    Bed-Chair [place] (Transfers) minimum assist (75% patient effort)  -CS       Row Name 06/17/25 0945          Sit-Stand Transfer    Sit-Stand [place] (Transfers) standby assist  -CS       Row Name 06/17/25 0945          Stand-Sit Transfer    Stand-Sit [place] (Transfers) standby assist  -CS       Row Name 06/17/25 0945          Gait/Stairs (Locomotion)    Gait/Stairs Locomotion gait/ambulation independence;gait/ambulation assistive device;distance ambulated  -CS     [place] Level (Gait) contact guard  -CS     Assistive Device (Gait) --  HHA  -CS     Patient was able to Ambulate yes  -CS     Distance in Feet (Gait) 120  -CS     Pattern (Gait) step-to  -CS     Deviations/Abnormal Patterns (Gait) gait speed decreased  -CS     Right Sided Gait Deviations weight  shift ability decreased  -CS       Row Name 06/17/25 0945          Safety Issues/Impairments Affecting Functional Mobility    Impairments Affecting Function (Mobility) balance;cognition;endurance/activity tolerance;pain;range of motion (ROM);strength  -CS       Row Name 06/17/25 0945          Respiratory WDL    Respiratory WDL WDL  -CS     Rhythm/Pattern, Respiratory unlabored;pattern regular;depth regular;no shortness of breath reported  -CS     Expansion/Accessory Muscles/Retractions no use of accessory muscles;no retractions;expansion symmetric  -CS     Cough Frequency no cough  -CS     Cough Type productive  -CS       Row Name 06/17/25 0945          Breath Sounds    All Lung Fields Breath Sounds All Fields  -CS     All Lung Fields Breath Sounds clear;equal bilaterally  -CS     DEB Breath Sounds Anterior:;Lateral:;clear  -CS     RUL Breath Sounds Anterior:;Lateral:;clear  -CS       Row Name 06/17/25 0945          Skin WDL    Skin WDL X;all  pt refused full skin assessment this morning, pt is A/O x4, denies any issues. Stated she would let us lookk later in the afternoon  -CS     Skin Color/Characteristics maroon/purple;redness blanchable  generalized bruising, Pt. states she fell at home  -CS     Skin Temperature warm  -CS     Skin Moisture dry  -CS     Skin Elasticity slow return to original state  -CS     Skin Integrity bruised (ecchymotic)  -CS       Row Name 06/17/25 0945          Wound 06/09/25 1511 Right shoulder Surgical Open Surgical Incision    Wound - Properties Group Placement Date: 06/09/25  -CE Placement Time: 1511  -CE Side: Right  -CE Location: shoulder  -CE Primary Wound Type: Surgical  -CE Secondary Wound Type - Surgical: Open Surg  -CE    Closure Unable to assess  -CS     Base unable to visualize  -CS     Periwound intact;pink  -CS     Periwound Temperature warm  -CS     Periwound Skin Turgor soft  -CS     Edges rolled/closed  -CS     Drainage Amount none  -CS     Retired Wound - Properties  Group Placement Date: 06/09/25  -CE Placement Time: 1511 -CE Side: Right  -CE Location: shoulder  -CE    Retired Wound - Properties Group Placement Date: 06/09/25  -CE Placement Time: 1511 -CE Side: Right  -CE Location: shoulder  -CE    Retired Wound - Properties Group Date first assessed: 06/09/25  -CE Time first assessed: 1511  -CE Side: Right  -CE Location: shoulder  -CE      Row Name 06/17/25 0945          Coping    Observed Emotional State calm;cooperative;pleasant  -CS     Verbalized Emotional State acceptance  -CS     Family/Support Persons family  -CS     Involvement in Care not present at bedside  -CS       Row Name 06/17/25 0945          Plan of Care Review    Plan of Care Reviewed With patient  -CS     Progress improving  -CS     Outcome Evaluation Pt safe for d/c from PT standpoint.  -CS       Row Name 06/17/25 0945          Positioning and Restraints    Pre-Treatment Position sitting in chair/recliner  -CS     Post Treatment Position chair  -CS     In Chair reclined  -CS       Row Name 06/17/25 0945          Therapy Assessment/Plan (PT)    Rehab Potential (PT) fair  -CS     Criteria for Skilled Interventions Met (PT) yes;skilled treatment is necessary  -CS     Therapy Frequency (PT) 5 times/wk  5x/wk  -CS     Problem List (PT) problems related to;balance;mobility;range of motion (ROM);strength;pain  -CS     Activity Limitations Related to Problem List (PT) unable to ambulate safely;unable to transfer safely  -CS       Row Name 06/17/25 0945          Physical Therapy Goals    Bed Mobility Goal Selection (PT) bed mobility, PT goal 1  -CS     Transfer Goal Selection (PT) transfer, PT goal 1  -CS     Gait Training Goal Selection (PT) gait training, PT goal 1  -CS       Row Name 06/17/25 0945          Bed Mobility Goal 1 (PT)    Activity/Assistive Device (Bed Mobility Goal 1, PT) bed mobility activities, all  -CS     [place] Level/Cues Needed (Bed Mobility Goal 1, PT) minimum assist (75% or more  patient effort)  -CS     Time Frame (Bed Mobility Goal 1, PT) by discharge  -CS     Progress/Outcomes (Bed Mobility Goal 1, PT) goal met  -CS       Row Name 06/17/25 0945          Transfer Goal 1 (PT)    Activity/Assistive Device (Transfer Goal 1, PT) transfers, all;walker, rolling  -CS     [place] Level/Cues Needed (Transfer Goal 1, PT) standby assist  -CS     Time Frame (Transfer Goal 1, PT) by discharge  -CS     Progress/Outcome (Transfer Goal 1, PT) goal met  -CS       Row Name 06/17/25 0945          Gait Training Goal 1 (PT)    Activity/Assistive Device (Gait Training Goal 1, PT) gait (walking locomotion);assistive device use;walker, rolling  -CS     [place] Level (Gait Training Goal 1, PT) standby assist  -CS     Distance (Gait Training Goal 1, PT) 30'  -CS     Time Frame (Gait Training Goal 1, PT) by discharge  -CS     Progress/Outcome (Gait Training Goal 1, PT) goal met  -CS       Row Name 06/17/25 0945          Discharge Summary (PT)    Additional Documentation Discharge Summary (PT) (Group)  -CS       Row Name 06/17/25 0945          Discharge Summary (PT)    Reason for Discharge (PT Discharge Summary) patient met all goals and outcomes  -CS     Outcomes Achieved Upon Discharge (PT Discharge Summary) all goals met within established timeframes  -CS     Transfer to Another Level of Care or Facility (PT Discharge Summary) home;home with home health recommended  -CS     Discharge Summary Statement (PT) D/C acute PT.  -CS               User Key  (r) = Recorded By, (t) = Taken By, (c) = Cosigned By      Initials Name Provider Type    Julián Cunningham, RN Registered Nurse    Petar Gilbert, PT Physical Therapist                      Physical Therapy Education       Title: PT OT SLP Therapies (Resolved)       Topic: Physical Therapy (Resolved)       Point: Mobility training (Resolved)       Learning Progress Summary            Patient Acceptance, E,TB, VU by KM at 6/10/2025 5067                       Point: Home exercise program (Resolved)       Learning Progress Summary            Patient Acceptance, E,TB, VU by  at 6/10/2025 1455                      Point: Body mechanics (Resolved)       Learning Progress Summary            Patient Acceptance, E,TB, VU by  at 6/10/2025 1455                      Point: Precautions (Resolved)       Learning Progress Summary            Patient Acceptance, E,TB, VU by  at 6/10/2025 1455                                      User Key       Initials Effective Dates Name Provider Type Discipline     05/24/22 -  Talat Trujillo, PT Physical Therapist PT                    PT Recommendation and Plan  Anticipated Discharge Disposition (PT): inpatient rehabilitation facility  Therapy Frequency (PT): 5 times/wk (5x/wk)  Plan of Care Reviewed With: patient  Progress: improving  Outcome Evaluation: Pt safe for d/c from PT standpoint.         Time Calculation:    PT Charges       Row Name 06/17/25 1440             Time Calculation    Start Time 0945  -CS      PT Received On 06/17/25  -CS         Timed Charges    62977 - Gait Training Minutes  15  -CS      55812 - PT Self Care/Mgmt Minutes 15  -CS         Total Minutes    Timed Charges Total Minutes 30  -CS       Total Minutes 30  -CS                User Key  (r) = Recorded By, (t) = Taken By, (c) = Cosigned By      Initials Name Provider Type    CS Petar Fontanez, PT Physical Therapist                  Therapy Charges for Today       Code Description Service Date Service Provider Modifiers Qty    80394294860 HC GAIT TRAINING EA 15 MIN 6/17/2025 Petar Fontanez, PT GP 1    97388012784 HC PT SELF CARE/MGMT/TRAIN EA 15 MIN 6/17/2025 Petar Fontanez, PT GP 1            PT G-Codes  AM-PAC 6 Clicks Score (PT): 20    PT Discharge Summary  Anticipated Discharge Disposition (PT): inpatient rehabilitation facility    Petar Fontanez PT  6/17/2025

## 2025-06-17 NOTE — DISCHARGE SUMMARY
[facility] HOSPITALISTS DISCHARGE SUMMARY    Patient Identification:  Name:  Tiara Stubbs  Age:  80 y.o.  Sex:  female  :  1945  MRN:  [id number]  Visit Number:  [id number]    Date of Admission: 2025  Date of Discharge:  2025     PCP: Paula Downey, DIEGO    DISCHARGE DIAGNOSIS ***      CONSULTS ***      PROCEDURES PERFORMED  ***    HOSPITAL COURSE  Patient is a 80 y.o. female presented to [facility] complaining of ***.  Please see the admitting history and physical for further details.        VITAL SIGNS:  Temp:  [97.4 °F (36.3 °C)-98.3 °F (36.8 °C)] 98.1 °F (36.7 °C)  Heart Rate:  [102] 102  Resp:  [16-20] 16  BP: (106-124)/(49-83) 110/54     on   ;   Device (Oxygen Therapy): room air    Body mass index is 30.16 kg/m².  Wt Readings from Last 3 Encounters:   06/10/25 87.4 kg (192 lb 9.6 oz)   24 89 kg (196 lb 3.2 oz)   24 88.8 kg (195 lb 12.8 oz)       PHYSICAL EXAM: ***  Constitutional:  Well-developed and well-nourished.  No acute distress.      HENT:  Head:  Normocephalic and atraumatic.  Mouth:  Moist mucous membranes.    Eyes:  Conjunctivae and EOM are normal. No scleral icterus.    Neck:  Neck supple.  No JVD present.    Cardiovascular:  Normal rate, regular rhythm, and normal heart sounds. No murmur.  Pulmonary/Chest:  Normal rate and effort. Breath sounds clear to auscultation bilaterally with good air movement.  Abdominal:  Soft. No distension and no tenderness. Normal bowel sounds present.  Musculoskeletal:  No tenderness and no deformity.  No red or swollen joints anywhere.    Neurological:  Alert and oriented to person, place, and time.  No focal strength or sensory deficit.    Skin:  Skin is warm and dry. No rash noted. No pallor.   Peripheral vascular:  No clubbing, no cyanosis, no edema.    DISCHARGE DISPOSITION   Stable    DISCHARGE MEDICATIONS:     Discharge Medications        New Medications        Instructions Start Date    ondansetron 4 MG tablet  Commonly known as: Zofran   4 mg, Oral, Every 8 Hours PRN      oxyCODONE-acetaminophen 7.5-325 MG per tablet  Commonly known as: PERCOCET   1 tablet, Oral, Every 8 Hours PRN      polyethylene glycol 17 GM/SCOOP powder  Commonly known as: MIRALAX   17 g, Oral, Daily PRN             Changes to Medications        Instructions Start Date   cyclobenzaprine 10 MG tablet  Commonly known as: FLEXERIL  What changed: Another medication with the same name was added. Make sure you understand how and when to take each.   10 mg, Nightly      cyclobenzaprine 10 MG tablet  Commonly known as: FLEXERIL  What changed: You were already taking a medication with the same name, and this prescription was added. Make sure you understand how and when to take each.   10 mg, Oral, 3 Times Daily PRN             Continue These Medications        Instructions Start Date   albuterol 1.25 MG/3ML nebulizer solution  Commonly known as: ACCUNEB   1.25 mg, 3 Times Daily PRN      aspirin 81 MG EC tablet   81 mg, Daily      Cholecalciferol 50 MCG (2000 UT) tablet   1 tablet, Daily      famotidine 40 MG tablet  Commonly known as: PEPCID   40 mg, Daily      folic acid 1 MG tablet  Commonly known as: FOLVITE   Take 1 tablet by mouth Daily.      gabapentin 400 MG capsule  Commonly known as: NEURONTIN   400 mg, 3 Times Daily      HYDROcodone-acetaminophen  MG per tablet  Commonly known as: NORCO   Take 1 tablet by mouth 4 (Four) Times a Day.      metoprolol succinate XL 25 MG 24 hr tablet  Commonly known as: TOPROL-XL   25 mg, Daily      olmesartan 40 MG tablet  Commonly known as: BENICAR   Take 1 tablet by mouth Daily.      predniSONE 1 MG tablet  Commonly known as: DELTASONE   4 mg, Daily      rosuvastatin 20 MG tablet  Commonly known as: CRESTOR   20 mg, Daily      valACYclovir 500 MG tablet  Commonly known as: VALTREX   500 mg, Daily               Diet Instructions       Diet: Regular/House Diet; Regular (IDDSI 7); Thin  (IDDSI 0)      Discharge Diet: Regular/House Diet    Texture: Regular (IDDSI 7)    Fluid Consistency: Thin (IDDSI 0)          Activity Instructions       Up WIth Assist            Additional Instructions for the Follow-ups that You Need to Schedule       Ambulatory Referral to Home Health   As directed      Face to Face Visit Date: 6/17/2025   Follow-up provider for Plan of Care?: I treated the patient in an acute care facility and will not continue treatment after discharge.   Follow-up provider: INDIANA DOWNEY [[id number]]   Reason/Clinical Findings: right shoulder replacement, right 5th metatarsal fracture   Describe mobility limitations that make leaving home difficult: right arm/shoulder and right lower extremity in braces   Nursing/Therapeutic Services Requested: Physical Therapy Occupational Therapy   PT orders: Therapeutic exercise Home safety assessment Strengthening Transfer training   Occupational orders: Activities of daily living   Frequency: 1 Week 1        Discharge Follow-up with PCP   As directed       Currently Documented PCP:    [place] Downey APRN    PCP Phone Number:    [phone]     Follow Up Details: within 1 week        Discharge Follow-up with Specified Provider: Dr. Carlin   As directed      To: Dr. Carlin   Follow Up Details: 1-2 weeks               Follow-up Information       Ameya Carlin MD Follow up in 2 week(s).    Specialty: Orthopedic Surgery  Contact information:  [address] [place] [address] [phone]               Indiana Downey APRN .    Specialty: Family Medicine  Why: within 1 week  Contact information:  [address] [place] [address] [address] [place] [address] [address] [place] [address] [place] [address] [phone]                              CODE STATUS  Code Status and Medical Interventions: CPR (Attempt to Resuscitate); Full Support   Ordered at: 06/07/25 1559     Code Status (Patient has no pulse and is not breathing):    CPR (Attempt to Resuscitate)     Medical Interventions  (Patient has pulse or is breathing):    Full Support     Level Of Support Discussed With:    Patient       The ASCVD Risk score (Julianna MELGAR, et al., 2019) failed to calculate for the following reasons:    The 2019 ASCVD risk score is only valid for ages 40 to 79     Amanda Pineda DO  [facility]  06/17/25  12:01 EDT    Please note that this discharge summary required more than 30 minutes to complete.

## 2025-06-17 NOTE — PLAN OF CARE
Goal Outcome Evaluation:              Outcome Evaluation: Patient has been resting in bed throughout the night. VSS on RA. pt was aox4, but had some confusion throughout the night. PRN pain medications given, See MAR. will continue with POC.

## 2025-06-17 NOTE — CASE MANAGEMENT/SOCIAL WORK
Discharge Planning Assessment   [place]     Patient Name: Tiara Stubbs  MRN: [id number]  Today's Date: 6/17/2025    Admit Date: 6/7/2025            Discharge Plan       Row Name 06/17/25 0909       Plan    Plan Pt was discussed with  inpatient rehab on this date. SS contacted [employer] per Ayana [phone] Auth#[id number] & Appeal Case Number [id number] who states Pt's appeal remains pending and insurance has up to 72 hours to make a decision about possible admit to  Inpatient acute rehab. Appeal was completed on 06/13/25, but weekend does not count toward the 72 hour time frame. SS to follow.    11:17am: SS and CM visited Pt and dtr at bedside to provide an update. Pt and dtr stated Physical therapy is now recommending home with home health rather than IPR. Pt and daughter voiced being agreeable to home with home health services, prefers [facility]. Pt currently has a rolling walker and straight cane via unknown provider. SS updated Provider with new discharge plan. SS to follow.                       Demographic Summary    No documentation.                  Functional Status    No documentation.                  Psychosocial    No documentation.                  Abuse/Neglect    No documentation.                  Legal    No documentation.                  Substance Abuse    No documentation.                  Patient Forms    No documentation.                     KARLY Robertson

## 2025-06-17 NOTE — PLAN OF CARE
Goal Outcome Evaluation:              Outcome Evaluation: Pt being discharged with HH.

## 2025-06-18 ENCOUNTER — TELEPHONE (OUTPATIENT)
Dept: MEDSURG UNIT | Facility: HOSPITAL | Age: 80
End: 2025-06-18
Payer: MEDICARE

## 2025-06-20 ENCOUNTER — READMISSION MANAGEMENT (OUTPATIENT)
Dept: CALL CENTER | Facility: HOSPITAL | Age: 80
End: 2025-06-20
Payer: MEDICARE

## 2025-06-20 NOTE — OUTREACH NOTE
Total Joint Week 1 Survey      Flowsheet Row Responses   [facility] patient discharged from? [facility]   Does the patient have one of the following disease processes/diagnoses(primary or secondary)? Total Joint Replacement   Joint surgery performed? Shoulder   Week 1 attempt successful? No   Unsuccessful attempts Attempt 1            Ras BOOKER - Registered Nurse

## 2025-06-25 ENCOUNTER — READMISSION MANAGEMENT (OUTPATIENT)
Dept: CALL CENTER | Facility: HOSPITAL | Age: 80
End: 2025-06-25
Payer: MEDICARE

## 2025-06-25 NOTE — OUTREACH NOTE
Total Joint Week 1 Survey      Flowsheet Row Responses   [facility] [place] patient discharged from? [facility]   Does the patient have one of the following disease processes/diagnoses(primary or secondary)? Total Joint Replacement   Joint surgery performed? Shoulder   Week 1 attempt successful? Yes   Call start time 1610   Call end time 1613   Has the patient been back in either the hospital or Emergency Department since discharge? No   Discharge diagnosis Total shoulder reverse arthroplasty   Is patient permission given to speak with other caregiver? Yes   List who call center can speak with daughter   Person spoke with today (if not patient) and relationship daughter   Does the patient have all medications related to this admission filled (includes all antibiotics, pain medications, etc.) Yes   Is the patient taking all medications as directed (includes completed medication regime)? Yes   Is the patient able to teach back alternate methods of pain control? Shoulder-elevate above heart/ keep in sling as advised, Reposition, Correct alignment, Short, frequent activity   Does the patient have a follow up appointment with their surgeon? Yes   Has the patient kept scheduled appointments due by today? N/A   Comments Follow up with surgeon on 7/2   What [facility] the [facility] agency?  [facility]    Has home health visited the patient within 72 hours of discharge? Yes   Psychosocial issues? No   Has the patient began therapy sessions (either in the home or as an out patient)? No   Does the patient have a wound vac in place? N/A   Has the patient fallen since discharge? No   Did the patient receive a copy of their discharge instructions? Yes   Nursing interventions Reviewed instructions with patient   What is the patient's perception of their functional status since discharge? Improving   Is the patient able to teach back signs and symptoms of infection? Temp >100.4 for 24h or longer, Incisional drainage, Blisters around incision,  Increased swelling or redness around incision (not associated with surgical edema), Severe discomfort or pain, Changes in mobility, Shortness of breath or chest pain   Is the patient able to teach back how to prevent infection? Check incision daily   Is the patient/caregiver able to teach back the hierarchy of who to call/visit for symptoms/problems? PCP, Specialist, Home health nurse, Urgent Care, ED, 911 Yes   Week 1 call completed? Yes   Is the patient interested in additional calls from an ambulatory ? No   Would this patient benefit from a Referral to [facility] Social Work? No   Wrap up additional comments Per daughter, patient is doing well, denies any questions or concerns.   Call end time 1613            Yumiko METZGER - Registered Nurse

## 2025-07-02 ENCOUNTER — READMISSION MANAGEMENT (OUTPATIENT)
Dept: CALL CENTER | Facility: HOSPITAL | Age: 80
End: 2025-07-02
Payer: MEDICARE

## 2025-07-02 NOTE — OUTREACH NOTE
Total Joint Week 2 Survey      Flowsheet Row Responses   [facility] facility patient discharged from? [facility]   Does the patient have one of the following disease processes/diagnoses(primary or secondary)? Total Joint Replacement   Joint surgery performed? Shoulder   Week 2 attempt successful? No   Unsuccessful attempts Attempt 1            Ras BOOKER - Registered Nurse

## 2025-07-08 ENCOUNTER — READMISSION MANAGEMENT (OUTPATIENT)
Dept: CALL CENTER | Facility: HOSPITAL | Age: 80
End: 2025-07-08
Payer: MEDICARE

## 2025-07-08 NOTE — OUTREACH NOTE
Total Joint Week 2 Survey      Flowsheet Row Responses   [facility] patient discharged from? [facility]   Does the patient have one of the following disease processes/diagnoses(primary or secondary)? Total Joint Replacement   Joint surgery performed? Shoulder   Week 2 attempt successful? Yes   Call start time 1325   Call end time 1327   Has the patient been back in either the hospital or Emergency Department since discharge? No   Discharge diagnosis Total shoulder reverse arthroplasty   Person spoke with today (if not patient) and relationship daughter   Does the patient have all medications related to this admission filled (includes all antibiotics, pain medications, etc.) Yes   Is the patient taking all medications as directed (includes completed medication regime)? Yes   Is the patient able to teach back alternate methods of pain control? Shoulder-elevate above heart/ keep in sling as advised, Reposition, Correct alignment, Short, frequent activity   Does the patient have a follow up appointment with their surgeon? Yes   Has the patient kept scheduled appointments due by today? Yes   What is the Home health agency?  [facility]   Has home health visited the patient within 72 hours of discharge? Yes   Psychosocial issues? No   Has the patient began therapy sessions (either in the home or as an out patient)? No   Does the patient have a wound vac in place? No   Has the patient fallen since discharge? No   Did the patient receive a copy of their discharge instructions? Yes   Nursing interventions Reviewed instructions with patient   What is the patient's perception of their functional status since discharge? Improving   Is the patient able to teach back signs and symptoms of infection? Temp >100.4 for 24h or longer, Incisional drainage, Blisters around incision, Increased swelling or redness around incision (not associated with surgical edema), Severe discomfort or pain, Changes in mobility, Shortness of breath or  chest pain   Is the patient able to teach back how to prevent infection? Check incision daily, Wash hands before and after touching incision, Keep incision covered if drainage   Is the patient able to teach back home safety measures? Ability to shower   Is the patient/caregiver able to teach back the hierarchy of who to call/visit for symptoms/problems? PCP, Specialist, Home health nurse, Urgent Care, ED, 911 Yes   Week 2 call completed? Yes   Is the patient interested in additional calls from an ambulatory ? No   Would this patient benefit from a Referral to [facility] Social Work? No   Wrap up additional comments Per daughter, patient is doing well, denies any questions or concerns.   Call end time 1327            BRIELLE MARLOW - Registered Nurse

## 2025-07-31 ENCOUNTER — READMISSION MANAGEMENT (OUTPATIENT)
Dept: CALL CENTER | Facility: HOSPITAL | Age: 80
End: 2025-07-31
Payer: MEDICARE

## 2025-07-31 NOTE — OUTREACH NOTE
Total Joint Month 1 Survey      Flowsheet Row Responses   [facility] patient discharged from? [facility]   Does the patient have one of the following disease processes/diagnoses(primary or secondary)? Total Joint Replacement   Joint surgery performed? Shoulder   Month 1 attempt successful? Yes   Call start time 0828   Call end time 0830   Has the patient been back in either the hospital or Emergency Department since discharge? No   Is the patient taking all medications as directed (includes completed medication regime)? Yes   Has the patient kept scheduled appointments due by today? Yes   Is the patient still receiving Home Health Services? Yes   Is the patient still attending therapy sessions(either in the home or as an outpatient)? No  [Will be starting Therapy after next Dr visit.]   Has the patient fallen since discharge? No   What is the patient's perception of their functional status since discharge? Improving   Is the patient able to teach back signs and symptoms of infection? --  [Healing well no issues]   Is the patient/caregiver able to teach back the hierarchy of who to call/visit for symptoms/problems? PCP, Specialist, Home health nurse, Urgent Care, ED, 911 Yes   Month 1 call completed? Yes   Graduated Yes   Graduated/Revoked comments Pt reports she is doing well. Site healing well. No needs.   Call end time 0830            LAVERNE SAVAGE - Registered Nurse

## 2025-08-08 ENCOUNTER — TRANSCRIBE ORDERS (OUTPATIENT)
Dept: ADMINISTRATIVE | Facility: HOSPITAL | Age: 80
End: 2025-08-08
Payer: MEDICARE

## 2025-08-08 DIAGNOSIS — Z78.0 MENOPAUSE: Primary | ICD-10-CM
